# Patient Record
Sex: FEMALE | Race: WHITE | Employment: PART TIME | URBAN - METROPOLITAN AREA
[De-identification: names, ages, dates, MRNs, and addresses within clinical notes are randomized per-mention and may not be internally consistent; named-entity substitution may affect disease eponyms.]

---

## 2018-03-20 ENCOUNTER — OFFICE VISIT (OUTPATIENT)
Dept: FAMILY MEDICINE CLINIC | Age: 28
End: 2018-03-20
Payer: COMMERCIAL

## 2018-03-20 VITALS
HEIGHT: 63 IN | HEART RATE: 80 BPM | OXYGEN SATURATION: 99 % | TEMPERATURE: 97.9 F | BODY MASS INDEX: 26.22 KG/M2 | WEIGHT: 148 LBS | DIASTOLIC BLOOD PRESSURE: 73 MMHG | SYSTOLIC BLOOD PRESSURE: 108 MMHG

## 2018-03-20 DIAGNOSIS — E55.9 VITAMIN D DEFICIENCY: ICD-10-CM

## 2018-03-20 DIAGNOSIS — F41.9 ANXIETY: Primary | ICD-10-CM

## 2018-03-20 DIAGNOSIS — G44.89 OTHER HEADACHE SYNDROME: ICD-10-CM

## 2018-03-20 DIAGNOSIS — Z00.00 WELLNESS EXAMINATION: ICD-10-CM

## 2018-03-20 DIAGNOSIS — E07.89 PALPABLE THYROID: ICD-10-CM

## 2018-03-20 PROCEDURE — 99214 OFFICE O/P EST MOD 30 MIN: CPT | Performed by: FAMILY MEDICINE

## 2018-03-20 RX ORDER — BUSPIRONE HYDROCHLORIDE 5 MG/1
5 TABLET ORAL 3 TIMES DAILY
Qty: 90 TABLET | Refills: 1 | Status: SHIPPED | OUTPATIENT
Start: 2018-03-20 | End: 2018-04-20 | Stop reason: SDUPTHER

## 2018-03-20 RX ORDER — MULTIVIT-MIN/IRON/FOLIC ACID/K 18-600-40
CAPSULE ORAL
Qty: 60 CAPSULE | Refills: 5 | Status: SHIPPED | OUTPATIENT
Start: 2018-03-20

## 2018-03-20 RX ORDER — ADHESIVE TAPE 3"X 2.3 YD
TAPE, NON-MEDICATED TOPICAL
Qty: 60 TABLET | Refills: 5 | Status: SHIPPED | OUTPATIENT
Start: 2018-03-20 | End: 2018-07-24 | Stop reason: SDUPTHER

## 2018-03-20 ASSESSMENT — ENCOUNTER SYMPTOMS
BLOOD IN STOOL: 0
NAUSEA: 0
CONSTIPATION: 0
VOMITING: 0
TROUBLE SWALLOWING: 0
DIARRHEA: 0
EYE PAIN: 0
SHORTNESS OF BREATH: 0
COUGH: 0
ABDOMINAL PAIN: 0

## 2018-03-20 ASSESSMENT — PATIENT HEALTH QUESTIONNAIRE - PHQ9
SUM OF ALL RESPONSES TO PHQ9 QUESTIONS 1 & 2: 1
2. FEELING DOWN, DEPRESSED OR HOPELESS: 1
SUM OF ALL RESPONSES TO PHQ QUESTIONS 1-9: 1
1. LITTLE INTEREST OR PLEASURE IN DOING THINGS: 0

## 2018-03-20 NOTE — PROGRESS NOTES
by mouth daily       No current facility-administered medications for this visit. No Known Allergies    Health Maintenance   Topic Date Due    Cervical cancer screen  01/01/2016    Flu vaccine (1) 09/01/2017    DTaP/Tdap/Td vaccine (2 - Td) 07/28/2026    HIV screen  Completed       Subjective:      Review of Systems   Constitutional: Negative for chills, fatigue and fever. HENT: Negative for ear pain, postnasal drip and trouble swallowing. Eyes: Negative for pain and visual disturbance. Respiratory: Negative for cough and shortness of breath. Cardiovascular: Negative for chest pain and palpitations. Gastrointestinal: Negative for abdominal pain, blood in stool, constipation, diarrhea, nausea and vomiting. Genitourinary: Negative for difficulty urinating. Skin: Negative for rash. Neurological: Positive for headaches. Objective:     Vitals:    03/20/18 0913   BP: 108/73   Site: Left Arm   Position: Sitting   Cuff Size: Medium Adult   Pulse: 80   Temp: 97.9 °F (36.6 °C)   TempSrc: Oral   SpO2: 99%   Weight: 148 lb (67.1 kg)   Height: 5' 2.99\" (1.6 m)       Body mass index is 26.22 kg/m². Wt Readings from Last 3 Encounters:   03/20/18 148 lb (67.1 kg)   07/26/16 176 lb (79.8 kg)   07/08/16 174 lb (78.9 kg)     BP Readings from Last 3 Encounters:   03/20/18 108/73   07/28/16 110/66   07/08/16 104/65       Physical Exam   Constitutional: She is oriented to person, place, and time. She appears well-developed and well-nourished. No distress. HENT:   Head: Normocephalic and atraumatic. Right Ear: External ear normal.   Left Ear: External ear normal.   Eyes: Conjunctivae and EOM are normal. Right eye exhibits no discharge. Left eye exhibits no discharge. No scleral icterus. Neck: Normal range of motion. Thyromegaly: palpable thyroid. Cardiovascular: Normal rate, regular rhythm and normal heart sounds. No murmur heard.   Pulmonary/Chest: Effort normal and breath sounds normal.

## 2018-03-20 NOTE — PATIENT INSTRUCTIONS
You may receive a survey about your visit with us today. The feedback from our patients helps us identify what is working well and where the service to all patients can be enhanced. Thank you! As of April 2018, Dr. Sylwia Puente of [address] [place] [address] will be located in our new satellite office at: [address] [address] [facility], [address] [facility], [facility], [address]. My Chart letters will be sent soon. [] Caffeine Use: Limit to 2 cups per day   (400mg of caffeine a day)     [] Exercise: Ideal 30 minutes per day, above normal activity              [] Eating Fruits and Vegetables: Studies show 8-10 servings per day decrease BP  [] Alcohol: Ideal maximum of one cup per day for females and two cups per day for a male       Will try some buspar and can take it up to 3 times a day    Can think about a regular medication    Can see Manny Alamo for the anxiety for tools also    Will have your start some vitamin D 2,000 a day and go up to 4,000 a day    Will start some magnesium 200mg a day at first - and may go to 3 times a day for the headaches    Will see Truman Kirby in a month    Patient Education        Learning About Anxiety Disorders  What are anxiety disorders? Anxiety disorders are a type of medical problem. They cause severe anxiety. When you feel anxious, you feel that something bad is about to happen. This feeling interferes with your life. These disorders include:  · Generalized anxiety disorder. You feel worried and stressed about many everyday events and activities. This goes on for several months and disrupts your life on most days. · Panic disorder. You have repeated panic attacks. A panic attack is a sudden, intense fear or anxiety. It may make you feel short of breath. Your heart may pound. · Social anxiety disorder. You feel very anxious about what you will say or do in front of people. For example, you may be scared to talk or eat in public. This problem affects your daily life. · Phobias.  You are very scared of a specific object, situation, or activity. For example, you may fear spiders, high places, or small spaces. What are the symptoms? Generalized anxiety disorder  Symptoms may include:  · Feeling worried and stressed about many things almost every day. · Feeling tired or irritable. You may have a hard time concentrating. · Having headaches or muscle aches. · Having a hard time getting to sleep or staying asleep. Panic disorder  You may have repeated panic attacks when there is no reason for feeling afraid. You may change your daily activities because you worry that you will have another attack. Symptoms may include:  · Intense fear, terror, or anxiety. · Trouble breathing or very fast breathing. · Chest pain or tightness. · A heartbeat that races or is not regular. Social anxiety disorder  Symptoms may include:  · Fear about a social situation, such as eating in front of others or speaking in public. You may worry a lot. Or you may be afraid that something bad will happen. · Anxiety that can cause you to blush, sweat, and feel shaky. · A heartbeat that is faster than normal.  · A hard time focusing. Phobias  Symptoms may include:  · More fear than most people of being around an object, being in a situation, or doing an activity. You might also be stressed about the chance of being around the thing you fear. · Worry about losing control, panicking, fainting, or having physical symptoms like a faster heartbeat when you are around the situation or object. How are these disorders treated? Anxiety disorders can be treated with medicines or counseling. A combination of both may be used. Medicines may include:  · Antidepressants. These may help your symptoms by keeping chemicals in your brain in balance. · Benzodiazepines. These may give you short-term relief of your symptoms. Some people use cognitive-behavioral therapy.  A therapist helps you learn to change stressful or bad thoughts into helpful

## 2018-03-29 LAB
BUN BLDV-MCNC: NORMAL MG/DL
CALCIUM SERPL-MCNC: NORMAL MG/DL
CHLORIDE BLD-SCNC: NORMAL MMOL/L
CHOLESTEROL, TOTAL: 196 MG/DL
CHOLESTEROL/HDL RATIO: NORMAL
CO2: NORMAL MMOL/L
CREAT SERPL-MCNC: 0.75 MG/DL
GFR CALCULATED: NORMAL
GLUCOSE BLD-MCNC: NORMAL MG/DL
HDLC SERPL-MCNC: 62 MG/DL (ref 35–70)
LDL CHOLESTEROL CALCULATED: 123 MG/DL (ref 0–160)
POTASSIUM SERPL-SCNC: 4.2 MMOL/L
SODIUM BLD-SCNC: NORMAL MMOL/L
TRIGL SERPL-MCNC: 54 MG/DL
VLDLC SERPL CALC-MCNC: 11 MG/DL

## 2018-04-02 ENCOUNTER — OFFICE VISIT (OUTPATIENT)
Dept: PSYCHOLOGY | Age: 28
End: 2018-04-02
Payer: COMMERCIAL

## 2018-04-02 DIAGNOSIS — F41.9 ANXIETY DISORDER, UNSPECIFIED TYPE: Primary | ICD-10-CM

## 2018-04-02 PROCEDURE — 90791 PSYCH DIAGNOSTIC EVALUATION: CPT | Performed by: PSYCHOLOGIST

## 2018-04-02 ASSESSMENT — PATIENT HEALTH QUESTIONNAIRE - PHQ9
9. THOUGHTS THAT YOU WOULD BE BETTER OFF DEAD, OR OF HURTING YOURSELF: 0
4. FEELING TIRED OR HAVING LITTLE ENERGY: 2
SUM OF ALL RESPONSES TO PHQ9 QUESTIONS 1 & 2: 1
2. FEELING DOWN, DEPRESSED OR HOPELESS: 1
1. LITTLE INTEREST OR PLEASURE IN DOING THINGS: 0
SUM OF ALL RESPONSES TO PHQ QUESTIONS 1-9: 5
5. POOR APPETITE OR OVEREATING: 0
3. TROUBLE FALLING OR STAYING ASLEEP: 2
10. IF YOU CHECKED OFF ANY PROBLEMS, HOW DIFFICULT HAVE THESE PROBLEMS MADE IT FOR YOU TO DO YOUR WORK, TAKE CARE OF THINGS AT HOME, OR GET ALONG WITH OTHER PEOPLE: 0
6. FEELING BAD ABOUT YOURSELF - OR THAT YOU ARE A FAILURE OR HAVE LET YOURSELF OR YOUR FAMILY DOWN: 0
7. TROUBLE CONCENTRATING ON THINGS, SUCH AS READING THE NEWSPAPER OR WATCHING TELEVISION: 0
8. MOVING OR SPEAKING SO SLOWLY THAT OTHER PEOPLE COULD HAVE NOTICED. OR THE OPPOSITE, BEING SO FIGETY OR RESTLESS THAT YOU HAVE BEEN MOVING AROUND A LOT MORE THAN USUAL: 0

## 2018-04-04 ENCOUNTER — OFFICE VISIT (OUTPATIENT)
Dept: NEUROLOGY | Age: 28
End: 2018-04-04
Payer: COMMERCIAL

## 2018-04-04 VITALS
HEIGHT: 62 IN | WEIGHT: 151.4 LBS | SYSTOLIC BLOOD PRESSURE: 100 MMHG | DIASTOLIC BLOOD PRESSURE: 58 MMHG | BODY MASS INDEX: 27.86 KG/M2 | HEART RATE: 60 BPM

## 2018-04-04 DIAGNOSIS — G43.109 MIGRAINE WITH AURA AND WITHOUT STATUS MIGRAINOSUS, NOT INTRACTABLE: Primary | ICD-10-CM

## 2018-04-04 PROCEDURE — 99214 OFFICE O/P EST MOD 30 MIN: CPT | Performed by: PSYCHIATRY & NEUROLOGY

## 2018-04-04 RX ORDER — AMITRIPTYLINE HYDROCHLORIDE 10 MG/1
10 TABLET, FILM COATED ORAL NIGHTLY
Qty: 30 TABLET | Refills: 3 | Status: SHIPPED | OUTPATIENT
Start: 2018-04-04 | End: 2018-05-16 | Stop reason: SDUPTHER

## 2018-04-04 RX ORDER — FOLIC ACID 1 MG/1
1 TABLET ORAL DAILY
Qty: 90 TABLET | Refills: 3 | Status: SHIPPED | OUTPATIENT
Start: 2018-04-04 | End: 2018-09-12 | Stop reason: ALTCHOICE

## 2018-04-05 ENCOUNTER — TELEPHONE (OUTPATIENT)
Dept: FAMILY MEDICINE CLINIC | Age: 28
End: 2018-04-05

## 2018-04-16 ENCOUNTER — OFFICE VISIT (OUTPATIENT)
Dept: PSYCHOLOGY | Age: 28
End: 2018-04-16
Payer: COMMERCIAL

## 2018-04-16 ENCOUNTER — HOSPITAL ENCOUNTER (OUTPATIENT)
Dept: MRI IMAGING | Age: 28
Discharge: HOME | End: 2018-04-16
Payer: COMMERCIAL

## 2018-04-16 DIAGNOSIS — F41.9 ANXIETY DISORDER, UNSPECIFIED TYPE: Primary | ICD-10-CM

## 2018-04-16 DIAGNOSIS — G43.109 MIGRAINE WITH AURA AND WITHOUT STATUS MIGRAINOSUS, NOT INTRACTABLE: ICD-10-CM

## 2018-04-16 DIAGNOSIS — F43.21 GRIEF: ICD-10-CM

## 2018-04-16 PROCEDURE — 6360000004 HC RX CONTRAST MEDICATION: Performed by: PSYCHIATRY & NEUROLOGY

## 2018-04-16 PROCEDURE — A9579 GAD-BASE MR CONTRAST NOS,1ML: HCPCS | Performed by: PSYCHIATRY & NEUROLOGY

## 2018-04-16 PROCEDURE — 70553 MRI BRAIN STEM W/O & W/DYE: CPT

## 2018-04-16 PROCEDURE — 90834 PSYTX W PT 45 MINUTES: CPT | Performed by: PSYCHOLOGIST

## 2018-04-16 RX ADMIN — GADOTERIDOL 15 ML: 279.3 INJECTION, SOLUTION INTRAVENOUS at 14:50

## 2018-04-20 ENCOUNTER — OFFICE VISIT (OUTPATIENT)
Dept: FAMILY MEDICINE CLINIC | Age: 28
End: 2018-04-20
Payer: COMMERCIAL

## 2018-04-20 VITALS
OXYGEN SATURATION: 99 % | DIASTOLIC BLOOD PRESSURE: 66 MMHG | HEIGHT: 63 IN | TEMPERATURE: 98 F | HEART RATE: 75 BPM | SYSTOLIC BLOOD PRESSURE: 108 MMHG

## 2018-04-20 DIAGNOSIS — F41.9 ANXIETY: ICD-10-CM

## 2018-04-20 DIAGNOSIS — Z13.31 POSITIVE DEPRESSION SCREENING: Primary | ICD-10-CM

## 2018-04-20 DIAGNOSIS — E83.42 HYPOMAGNESEMIA: ICD-10-CM

## 2018-04-20 DIAGNOSIS — G44.89 OTHER HEADACHE SYNDROME: ICD-10-CM

## 2018-04-20 PROCEDURE — 99213 OFFICE O/P EST LOW 20 MIN: CPT | Performed by: NURSE PRACTITIONER

## 2018-04-20 PROCEDURE — G8431 POS CLIN DEPRES SCRN F/U DOC: HCPCS | Performed by: NURSE PRACTITIONER

## 2018-04-20 RX ORDER — BUSPIRONE HYDROCHLORIDE 5 MG/1
5 TABLET ORAL 3 TIMES DAILY
Qty: 90 TABLET | Refills: 3 | Status: SHIPPED | OUTPATIENT
Start: 2018-04-20 | End: 2018-07-24 | Stop reason: SDUPTHER

## 2018-04-20 ASSESSMENT — ENCOUNTER SYMPTOMS
COUGH: 0
EYE DISCHARGE: 0
ANAL BLEEDING: 0
RHINORRHEA: 0
BLOOD IN STOOL: 0
ABDOMINAL DISTENTION: 0
DIARRHEA: 0
COLOR CHANGE: 0
CONSTIPATION: 0
SHORTNESS OF BREATH: 0
NAUSEA: 0
SORE THROAT: 0
EYE REDNESS: 0
ABDOMINAL PAIN: 0

## 2018-05-14 ENCOUNTER — OFFICE VISIT (OUTPATIENT)
Dept: PSYCHOLOGY | Age: 28
End: 2018-05-14
Payer: COMMERCIAL

## 2018-05-14 DIAGNOSIS — F41.9 ANXIETY DISORDER, UNSPECIFIED TYPE: ICD-10-CM

## 2018-05-14 DIAGNOSIS — F43.21 GRIEF: Primary | ICD-10-CM

## 2018-05-14 PROCEDURE — 90832 PSYTX W PT 30 MINUTES: CPT | Performed by: PSYCHOLOGIST

## 2018-05-16 ENCOUNTER — OFFICE VISIT (OUTPATIENT)
Dept: NEUROLOGY | Age: 28
End: 2018-05-16
Payer: COMMERCIAL

## 2018-05-16 VITALS
HEIGHT: 62 IN | SYSTOLIC BLOOD PRESSURE: 118 MMHG | DIASTOLIC BLOOD PRESSURE: 78 MMHG | BODY MASS INDEX: 27.42 KG/M2 | WEIGHT: 149 LBS | HEART RATE: 98 BPM

## 2018-05-16 DIAGNOSIS — G43.109 MIGRAINE WITH AURA AND WITHOUT STATUS MIGRAINOSUS, NOT INTRACTABLE: Primary | ICD-10-CM

## 2018-05-16 PROCEDURE — 99213 OFFICE O/P EST LOW 20 MIN: CPT | Performed by: PSYCHIATRY & NEUROLOGY

## 2018-05-16 RX ORDER — AMITRIPTYLINE HYDROCHLORIDE 10 MG/1
10 TABLET, FILM COATED ORAL NIGHTLY
Qty: 30 TABLET | Refills: 5 | Status: SHIPPED | OUTPATIENT
Start: 2018-05-16 | End: 2018-07-24 | Stop reason: ALTCHOICE

## 2018-07-24 ENCOUNTER — OFFICE VISIT (OUTPATIENT)
Dept: FAMILY MEDICINE CLINIC | Age: 28
End: 2018-07-24
Payer: COMMERCIAL

## 2018-07-24 VITALS
HEIGHT: 62 IN | WEIGHT: 150.8 LBS | HEART RATE: 68 BPM | DIASTOLIC BLOOD PRESSURE: 68 MMHG | SYSTOLIC BLOOD PRESSURE: 104 MMHG | TEMPERATURE: 98.6 F | OXYGEN SATURATION: 98 % | BODY MASS INDEX: 27.75 KG/M2

## 2018-07-24 DIAGNOSIS — R90.82 WHITE MATTER ABNORMALITY ON MRI OF BRAIN: ICD-10-CM

## 2018-07-24 DIAGNOSIS — F41.9 ANXIETY: Primary | ICD-10-CM

## 2018-07-24 DIAGNOSIS — G44.89 OTHER HEADACHE SYNDROME: ICD-10-CM

## 2018-07-24 DIAGNOSIS — E55.9 VITAMIN D DEFICIENCY: ICD-10-CM

## 2018-07-24 DIAGNOSIS — R40.0 DAYTIME SOMNOLENCE: ICD-10-CM

## 2018-07-24 PROCEDURE — 99214 OFFICE O/P EST MOD 30 MIN: CPT | Performed by: FAMILY MEDICINE

## 2018-07-24 RX ORDER — ADHESIVE TAPE 3"X 2.3 YD
TAPE, NON-MEDICATED TOPICAL
Qty: 60 TABLET | Refills: 5 | Status: SHIPPED | OUTPATIENT
Start: 2018-07-24 | End: 2019-03-19

## 2018-07-24 RX ORDER — MULTIVIT-MIN/IRON/FOLIC ACID/K 18-600-40
CAPSULE ORAL
Qty: 60 CAPSULE | Refills: 5 | Status: CANCELLED | OUTPATIENT
Start: 2018-07-24

## 2018-07-24 RX ORDER — BUSPIRONE HYDROCHLORIDE 5 MG/1
5 TABLET ORAL 3 TIMES DAILY
Qty: 90 TABLET | Refills: 3 | Status: SHIPPED | OUTPATIENT
Start: 2018-07-24 | End: 2019-05-22 | Stop reason: SDUPTHER

## 2018-07-24 RX ORDER — OMEPRAZOLE 20 MG/1
20 CAPSULE, DELAYED RELEASE ORAL 2 TIMES DAILY
Qty: 30 CAPSULE | Refills: 3 | Status: SHIPPED | OUTPATIENT
Start: 2018-07-24 | End: 2018-09-12 | Stop reason: SDUPTHER

## 2018-07-24 ASSESSMENT — ENCOUNTER SYMPTOMS
NAUSEA: 0
EYE PAIN: 0
TROUBLE SWALLOWING: 0
CONSTIPATION: 0
VOMITING: 0
BLOOD IN STOOL: 0
ABDOMINAL PAIN: 0
COUGH: 0
SHORTNESS OF BREATH: 0
DIARRHEA: 0
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
## 2018-09-12 ENCOUNTER — OFFICE VISIT (OUTPATIENT)
Dept: FAMILY MEDICINE CLINIC | Age: 28
End: 2018-09-12
Payer: COMMERCIAL

## 2018-09-12 VITALS
BODY MASS INDEX: 27.49 KG/M2 | DIASTOLIC BLOOD PRESSURE: 80 MMHG | HEIGHT: 62 IN | SYSTOLIC BLOOD PRESSURE: 96 MMHG | RESPIRATION RATE: 12 BRPM | TEMPERATURE: 98.3 F | OXYGEN SATURATION: 99 % | WEIGHT: 149.4 LBS | HEART RATE: 65 BPM

## 2018-09-12 DIAGNOSIS — Z00.00 WELLNESS EXAMINATION: Primary | ICD-10-CM

## 2018-09-12 PROCEDURE — 99395 PREV VISIT EST AGE 18-39: CPT | Performed by: NURSE PRACTITIONER

## 2018-09-12 RX ORDER — LANOLIN ALCOHOL/MO/W.PET/CERES
3 CREAM (GRAM) TOPICAL NIGHTLY PRN
COMMUNITY
End: 2019-03-19

## 2018-09-12 RX ORDER — OMEPRAZOLE 20 MG/1
20 CAPSULE, DELAYED RELEASE ORAL 2 TIMES DAILY
Qty: 180 CAPSULE | Refills: 1 | Status: SHIPPED | OUTPATIENT
Start: 2018-09-12 | End: 2018-11-19

## 2018-09-12 ASSESSMENT — ENCOUNTER SYMPTOMS
ABDOMINAL PAIN: 0
NAUSEA: 0
SORE THROAT: 0
COUGH: 0
CONSTIPATION: 0
EYE REDNESS: 0
COLOR CHANGE: 0
BLOOD IN STOOL: 0
RHINORRHEA: 0
EYE DISCHARGE: 0
ABDOMINAL DISTENTION: 0
SHORTNESS OF BREATH: 0
DIARRHEA: 0
ANAL BLEEDING: 0

## 2018-09-12 NOTE — PROGRESS NOTES
nausea. Skin: Negative for color change and rash. Neurological: Negative for facial asymmetry, speech difficulty and weakness. Hematological: Does not bruise/bleed easily. Psychiatric/Behavioral: Negative for agitation and confusion. Objective:     Vitals:    09/12/18 1451   BP: 96/80   Site: Left Upper Arm   Position: Sitting   Cuff Size: Medium Adult   Pulse: 65   Resp: 12   Temp: 98.3 °F (36.8 °C)   TempSrc: Oral   SpO2: 99%   Weight: 149 lb 6.4 oz (67.8 kg)   Height: 5' 2.4\" (1.585 m)       Body mass index is 26.97 kg/m². Wt Readings from Last 3 Encounters:   09/12/18 149 lb 6.4 oz (67.8 kg)   07/24/18 150 lb 12.8 oz (68.4 kg)   05/16/18 149 lb (67.6 kg)     BP Readings from Last 3 Encounters:   09/12/18 96/80   07/24/18 104/68   05/16/18 118/78       Physical Exam   Constitutional: She is oriented to person, place, and time. She appears well-developed and well-nourished. No distress. HENT:   Head: Normocephalic and atraumatic. Right Ear: Hearing and external ear normal. No swelling. Left Ear: Hearing and external ear normal. No swelling. Nose: No mucosal edema or rhinorrhea. Right sinus exhibits no maxillary sinus tenderness and no frontal sinus tenderness. Left sinus exhibits no maxillary sinus tenderness and no frontal sinus tenderness. Mouth/Throat: Oropharynx is clear and moist. No oropharyngeal exudate or posterior oropharyngeal erythema. Eyes: Pupils are equal, round, and reactive to light. Conjunctivae are normal. Right eye exhibits no discharge. Left eye exhibits no discharge. Neck: Normal range of motion. Cardiovascular: Normal rate and regular rhythm. No lower extremity edema   Pulmonary/Chest: Effort normal and breath sounds normal. No respiratory distress. She has no wheezes. Musculoskeletal: She exhibits no tenderness or deformity. Full ROM of upper and lower extremities    Lymphadenopathy:     She has no cervical adenopathy.    Neurological: She is alert and oriented to person, place, and time. Coordination and gait normal.   Skin: Skin is warm and dry. No rash noted. She is not diaphoretic. No cyanosis. Nails show no clubbing. Psychiatric: She has a normal mood and affect. Her speech is normal and behavior is normal. Judgment and thought content normal. Cognition and memory are normal.     Lab Results   Component Value Date    WBC 8.0 07/26/2016    HGB 12.1 07/26/2016    HCT 36.5 (L) 07/26/2016     (L) 07/26/2016    CHOL 196 03/29/2018    TRIG 54 03/29/2018    HDL 62 03/29/2018    LDLCALC 123 03/29/2018    K 4.2 03/29/2018    CREATININE 0.75 03/29/2018    TSH 0.791 12/04/2015    LABA1C 5.0 10/21/2015    MG 2.0 10/21/2015    CALCIUM 9.8 10/21/2015    VITD25 29 (L) 10/21/2015       Assessment:       Diagnosis Orders   1. 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 Orders Placed:  No orders of the defined types were placed in this encounter. Medications Prescribed:  Orders Placed This Encounter   Medications    omeprazole (PRILOSEC) 20 MG delayed release capsule     Sig: Take 1 capsule by mouth 2 times daily     Dispense:  180 capsule     Refill:  1       Future Appointments  Date Time Provider Lesley Alvarez   11/19/2018 1:30 PM MD [facility] [address]        Patient given educational materials - see patient instructions. Discussed use, benefit, and side effects of prescribed medications. All patient questions answered. Pt voiced understanding. Reviewed health maintenance. Instructed to continue current medications, diet and exercise. Patient agreed with treatment plan. Follow up as directed.      Electronically signed by MELANY Truong CNP on 9/12/2018 at 4:02 PM

## 2018-10-03 ENCOUNTER — HOSPITAL ENCOUNTER (EMERGENCY)
Dept: GENERAL RADIOLOGY | Age: 28
Discharge: HOME | End: 2018-10-03
Payer: COMMERCIAL

## 2018-10-03 ENCOUNTER — HOSPITAL ENCOUNTER (EMERGENCY)
Age: 28
Discharge: HOME | End: 2018-10-03
Attending: NURSE PRACTITIONER
Payer: COMMERCIAL

## 2018-10-03 VITALS
BODY MASS INDEX: 28.13 KG/M2 | RESPIRATION RATE: 16 BRPM | HEIGHT: 61 IN | OXYGEN SATURATION: 97 % | TEMPERATURE: 98.5 F | SYSTOLIC BLOOD PRESSURE: 121 MMHG | WEIGHT: 149 LBS | DIASTOLIC BLOOD PRESSURE: 61 MMHG | HEART RATE: 81 BPM

## 2018-10-03 DIAGNOSIS — S70.02XA CONTUSION OF LEFT HIP AND THIGH, INITIAL ENCOUNTER: Primary | ICD-10-CM

## 2018-10-03 DIAGNOSIS — S70.12XA CONTUSION OF LEFT HIP AND THIGH, INITIAL ENCOUNTER: Primary | ICD-10-CM

## 2018-10-03 PROCEDURE — 73502 X-RAY EXAM HIP UNI 2-3 VIEWS: CPT

## 2018-10-03 PROCEDURE — 99213 OFFICE O/P EST LOW 20 MIN: CPT

## 2018-10-03 PROCEDURE — 99213 OFFICE O/P EST LOW 20 MIN: CPT | Performed by: NURSE PRACTITIONER

## 2018-10-03 RX ORDER — IBUPROFEN 800 MG/1
800 TABLET ORAL EVERY 6 HOURS PRN
COMMUNITY
End: 2019-03-19

## 2018-10-03 ASSESSMENT — ENCOUNTER SYMPTOMS
COLOR CHANGE: 0
BACK PAIN: 0

## 2018-10-03 ASSESSMENT — PAIN DESCRIPTION - PROGRESSION: CLINICAL_PROGRESSION: GRADUALLY WORSENING

## 2018-10-03 ASSESSMENT — PAIN DESCRIPTION - ONSET: ONSET: SUDDEN

## 2018-10-03 ASSESSMENT — PAIN DESCRIPTION - LOCATION: LOCATION: HIP

## 2018-10-03 ASSESSMENT — PAIN DESCRIPTION - ORIENTATION: ORIENTATION: LEFT;OUTER

## 2018-10-03 ASSESSMENT — PAIN SCALES - GENERAL: PAINLEVEL_OUTOF10: 6

## 2018-10-03 ASSESSMENT — PAIN DESCRIPTION - FREQUENCY: FREQUENCY: CONTINUOUS

## 2018-10-03 ASSESSMENT — PAIN DESCRIPTION - PAIN TYPE: TYPE: ACUTE PAIN

## 2018-10-03 ASSESSMENT — PAIN DESCRIPTION - DESCRIPTORS: DESCRIPTORS: BURNING;DULL;SHARP

## 2018-10-03 NOTE — ED NOTES
Patient verbalized understanding of discharge instructions. Denies questions or concerns at this time.       Tess Singh RN  10/03/18 4327

## 2018-10-03 NOTE — ED PROVIDER NOTES
tablet by mouth 3 times daily, Disp-90 tablet, R-3Normal      Magnesium Oxide 200 MG TABS One tablet twice a day, Disp-60 tablet, R-5Normal      Cholecalciferol (VITAMIN D) 2000 units CAPS capsule 2,000 a day for 2 weeks and then 4,000 a day, Disp-60 capsule, R-5Normal      Prenatal Vit-Fe Fumarate-FA (PRENATAL COMPLETE PO) Take 1 tablet by mouth daily             ALLERGIES     Patient is has No Known Allergies. FAMILY HISTORY     Patient's family history includes Cancer in her maternal grandfather; Depression in her maternal aunt and maternal grandmother; Heart Disease in her maternal grandfather; High Blood Pressure in her father and maternal grandfather; High Cholesterol in her father; No Known Problems in her sister; Other in her mother. SOCIAL HISTORY     Patient  reports that she has never smoked. She has never used smokeless tobacco. She reports that she drinks alcohol. She reports that she does not use drugs. PHYSICAL EXAM     ED TRIAGE VITALS  BP: 121/61, Temp: 98.5 °F (36.9 °C), Pulse: 81, Resp: 16, SpO2: 97 %  Physical Exam   Constitutional: She is oriented to person, place, and time. She appears well-developed and well-nourished. No distress. HENT:   Head: Normocephalic and atraumatic. Musculoskeletal:        Left hip: Normal. She exhibits normal range of motion, normal strength, no tenderness, no bony tenderness, no swelling and no deformity. Left upper leg: She exhibits tenderness and swelling. She exhibits no bony tenderness, no deformity and no laceration. Legs:  Neurological: She is alert and oriented to person, place, and time. Skin: Skin is warm and dry. Psychiatric: She has a normal mood and affect. Nursing note and vitals reviewed. DIAGNOSTIC RESULTS   Labs:No results found for this visit on 10/03/18. IMAGING:  XR HIP LEFT (2-3 VIEWS)   Final Result   No acute fracture or dislocation.  If there is high clinical suspicion for fracture consider limited MRI

## 2018-10-10 ENCOUNTER — OFFICE VISIT (OUTPATIENT)
Dept: FAMILY MEDICINE CLINIC | Age: 28
End: 2018-10-10
Payer: COMMERCIAL

## 2018-10-10 VITALS
RESPIRATION RATE: 12 BRPM | TEMPERATURE: 98.2 F | HEIGHT: 61 IN | OXYGEN SATURATION: 98 % | DIASTOLIC BLOOD PRESSURE: 72 MMHG | SYSTOLIC BLOOD PRESSURE: 98 MMHG | BODY MASS INDEX: 28.17 KG/M2 | WEIGHT: 149.2 LBS | HEART RATE: 76 BPM

## 2018-10-10 DIAGNOSIS — J01.10 ACUTE NON-RECURRENT FRONTAL SINUSITIS: Primary | ICD-10-CM

## 2018-10-10 PROCEDURE — 99213 OFFICE O/P EST LOW 20 MIN: CPT | Performed by: NURSE PRACTITIONER

## 2018-10-10 RX ORDER — FLUTICASONE PROPIONATE 50 MCG
1 SPRAY, SUSPENSION (ML) NASAL DAILY
Qty: 1 BOTTLE | Refills: 3 | Status: SHIPPED | OUTPATIENT
Start: 2018-10-10 | End: 2019-03-19

## 2018-10-10 RX ORDER — ALCOHOL ANTISEPTIC PADS
2 PADS, MEDICATED (EA) TOPICAL 2 TIMES DAILY
COMMUNITY
End: 2021-06-15 | Stop reason: ALTCHOICE

## 2018-10-10 RX ORDER — AMOXICILLIN AND CLAVULANATE POTASSIUM 875; 125 MG/1; MG/1
1 TABLET, FILM COATED ORAL 2 TIMES DAILY
Qty: 10 TABLET | Refills: 0 | Status: SHIPPED | OUTPATIENT
Start: 2018-10-10 | End: 2018-10-15

## 2018-10-10 ASSESSMENT — ENCOUNTER SYMPTOMS
BLOOD IN STOOL: 0
SORE THROAT: 1
SHORTNESS OF BREATH: 0
ABDOMINAL DISTENTION: 0
NAUSEA: 0
DIARRHEA: 0
CONSTIPATION: 0
ANAL BLEEDING: 0
RHINORRHEA: 0
EYE DISCHARGE: 0
SINUS PAIN: 1
SINUS PRESSURE: 1
COLOR CHANGE: 0
COUGH: 1
ABDOMINAL PAIN: 0
EYE REDNESS: 0

## 2018-10-10 NOTE — PROGRESS NOTES
 fluticasone (FLONASE) 50 MCG/ACT nasal spray 1 spray by Each Nare route daily 1 Bottle 3    amoxicillin-clavulanate (AUGMENTIN) 875-125 MG per tablet Take 1 tablet by mouth 2 times daily for 5 days 10 tablet 0    ibuprofen (ADVIL;MOTRIN) 800 MG tablet Take 800 mg by mouth every 6 hours as needed for Pain      B Complex-C (SUPER B COMPLEX PO) Take 1 tablet by mouth daily      melatonin 3 MG TABS tablet Take 3 mg by mouth nightly as needed      omeprazole (PRILOSEC) 20 MG delayed release capsule Take 1 capsule by mouth 2 times daily 180 capsule 1    busPIRone (BUSPAR) 5 MG tablet Take 1 tablet by mouth 3 times daily 90 tablet 3    Magnesium Oxide 200 MG TABS One tablet twice a day 60 tablet 5    Cholecalciferol (VITAMIN D) 2000 units CAPS capsule 2,000 a day for 2 weeks and then 4,000 a day (Patient taking differently: Take 5,000 Units by mouth daily ) 60 capsule 5    Prenatal Vit-Fe Fumarate-FA (PRENATAL COMPLETE PO) Take 1 tablet by mouth daily       No current facility-administered medications for this visit. No Known Allergies    Subjective:    Review of Systems   Constitutional: Positive for chills and fatigue. Negative for fever. HENT: Positive for congestion, postnasal drip, sinus pain, sinus pressure and sore throat. Negative for ear pain and rhinorrhea. Bilateral ear pressure/itching     Eyes: Negative for discharge and redness. Respiratory: Positive for cough. Negative for shortness of breath. Cardiovascular: Negative for chest pain and leg swelling. Gastrointestinal: Negative for abdominal distention, abdominal pain, anal bleeding, blood in stool, constipation, diarrhea and nausea. Skin: Negative for color change and rash. Neurological: Positive for headaches. Negative for facial asymmetry, speech difficulty and weakness. Hematological: Does not bruise/bleed easily. Psychiatric/Behavioral: Negative for agitation and confusion.        Objective:     Vitals: 10/10/18 0831   BP: 98/72   Pulse: 76   Resp: 12   Temp: 98.2 °F (36.8 °C)   TempSrc: Oral   SpO2: 98%   Weight: 149 lb 3.2 oz (67.7 kg)   Height: 5' 0.98\" (1.549 m)       Body mass index is 28.21 kg/m². Wt Readings from Last 3 Encounters:   10/10/18 149 lb 3.2 oz (67.7 kg)   10/03/18 149 lb (67.6 kg)   09/12/18 149 lb 6.4 oz (67.8 kg)     BP Readings from Last 3 Encounters:   10/10/18 98/72   10/03/18 121/61   09/12/18 96/80       Physical Exam   Constitutional: She is oriented to person, place, and time. She appears well-developed and well-nourished. No distress. HENT:   Head: Normocephalic and atraumatic. Right Ear: Hearing and external ear normal. No swelling. Tympanic membrane is not erythematous, not retracted and not bulging. A middle ear effusion is present. Left Ear: Hearing and external ear normal. No swelling. Tympanic membrane is not erythematous, not retracted and not bulging. A middle ear effusion is present. Nose: Mucosal edema and rhinorrhea present. Right sinus exhibits maxillary sinus tenderness and frontal sinus tenderness. Left sinus exhibits maxillary sinus tenderness and frontal sinus tenderness. Mouth/Throat: Oropharynx is clear and moist. No oropharyngeal exudate or posterior oropharyngeal erythema. Eyes: Pupils are equal, round, and reactive to light. Conjunctivae are normal. Right eye exhibits no discharge. Left eye exhibits no discharge. Neck: Normal range of motion. Cardiovascular:   No lower extremity edema   Pulmonary/Chest: Effort normal and breath sounds normal. No respiratory distress. Musculoskeletal: She exhibits no tenderness or deformity. Full ROM of upper and lower extremities    Lymphadenopathy:        Head (right side): No submandibular, no tonsillar and no preauricular adenopathy present. Head (left side): No submandibular, no tonsillar and no preauricular adenopathy present. She has no cervical adenopathy.    Neurological: She is alert and

## 2018-10-10 NOTE — PATIENT INSTRUCTIONS
1. You may receive a survey about your visit with us today. The feedback from our patients helps us identify what is working well and where the service to all patients can be enhanced. Thank you! 2. Please bring in ALL medications BOTTLES, including inhalers, herbal supplements, over the counter, prescribed & non-prescribed medicine. The office would like actual medication bottles and a list.  3. Please note our OFFICE POLICIES:  a. Prior to getting your labs drawn, please check with your insurance company for benefits and eligibility of lab services. Often, insurance companies cover certain tests for preventative visits only. It is patient's responsibility to see what is covered. b. We are unable to change a diagnosis after the test has been performed. c. Lab orders will not be re-printed. Please hold onto your original lab orders and take them to your lab to be completed. d. If you no show your schedule appointment three times, you be dismissed from this practice. e. Fam Fang must be completed 24 hours prior to your schedule appointment. 4. If the list below has been completed, PLEASE FAX RECORDS TO OUR OFFICE @ [phone]. Once the records have been received we will update your records at our office. Health Maintenance Due   Topic Date Due    Flu vaccine (1) 09/01/2018       Schedule your 2018 flu vaccine with Dr. Mauricio Christie call [phone]! [facility], for anyone 9 years or older   [address]   Every Monday   8:00am-11:00am and 1:00pm-3:00pm    · Will give Augmentin - take as directed until gone  · Flonase nasal spray - use once daily for 5-7 days  · Can try robitussin liquid for congestion    · How can you care for yourself at home? · Take an over-the-counter pain medicine, such as acetaminophen (Tylenol), ibuprofen (Advil, Motrin), or naproxen (Aleve). Read and follow all instructions on the label.   · If the doctor prescribed antibiotics, take

## 2018-10-23 ENCOUNTER — OFFICE VISIT (OUTPATIENT)
Dept: FAMILY MEDICINE CLINIC | Age: 28
End: 2018-10-23
Payer: COMMERCIAL

## 2018-10-23 VITALS
RESPIRATION RATE: 12 BRPM | BODY MASS INDEX: 28.36 KG/M2 | WEIGHT: 150.2 LBS | SYSTOLIC BLOOD PRESSURE: 100 MMHG | HEART RATE: 70 BPM | HEIGHT: 61 IN | DIASTOLIC BLOOD PRESSURE: 76 MMHG | TEMPERATURE: 98.6 F | OXYGEN SATURATION: 100 %

## 2018-10-23 DIAGNOSIS — B96.89 ACUTE BACTERIAL SINUSITIS: Primary | ICD-10-CM

## 2018-10-23 DIAGNOSIS — J01.90 ACUTE BACTERIAL SINUSITIS: Primary | ICD-10-CM

## 2018-10-23 PROCEDURE — 99213 OFFICE O/P EST LOW 20 MIN: CPT | Performed by: FAMILY MEDICINE

## 2018-10-23 RX ORDER — AZITHROMYCIN 250 MG/1
TABLET, FILM COATED ORAL
Qty: 1 PACKET | Refills: 0 | Status: SHIPPED | OUTPATIENT
Start: 2018-10-23 | End: 2018-10-27

## 2018-10-23 RX ORDER — AZITHROMYCIN 250 MG/1
250 TABLET, FILM COATED ORAL DAILY
Qty: 6 TABLET | Refills: 0 | Status: SHIPPED | OUTPATIENT
Start: 2018-10-23 | End: 2018-10-23 | Stop reason: CLARIF

## 2018-10-23 ASSESSMENT — ENCOUNTER SYMPTOMS
VOMITING: 0
SINUS PAIN: 1
CONSTIPATION: 0
DIARRHEA: 0
TROUBLE SWALLOWING: 0
COUGH: 0
ABDOMINAL PAIN: 0
NAUSEA: 0
SHORTNESS OF BREATH: 0
SINUS PRESSURE: 1
BLOOD IN STOOL: 0
EYE PAIN: 0
RHINORRHEA: 1

## 2018-10-23 NOTE — PROGRESS NOTES
file.    Orders Placed:  No orders of the defined types were placed in this encounter. Medications Prescribed:  No orders of the defined types were placed in this encounter. Future Appointments  Date Time Provider Lesley Alvarez   11/19/2018 1:30 PM Kayla Marie MD [address] [facility]   3/18/2019 9:30 AM Cathryn Whitaker DO [facility] [address] [place] [address]       Patient given educational materials - see patient instructions. Discussed use, benefit, and sideeffects of prescribed medications. All patient questions answered. Pt voiced understanding. Reviewed health maintenance. Instructed to continue current medications, diet and exercise. Patient agreed with treatment plan. Follow up as directed.      Electronically signed by Chika Flores DO on 10/23/2018 at 12:39 PM

## 2018-11-19 ENCOUNTER — OFFICE VISIT (OUTPATIENT)
Dept: NEUROLOGY | Age: 28
End: 2018-11-19
Payer: COMMERCIAL

## 2018-11-19 VITALS
SYSTOLIC BLOOD PRESSURE: 116 MMHG | HEIGHT: 62 IN | DIASTOLIC BLOOD PRESSURE: 62 MMHG | HEART RATE: 64 BPM | WEIGHT: 153 LBS | BODY MASS INDEX: 28.16 KG/M2

## 2018-11-19 DIAGNOSIS — G43.109 MIGRAINE WITH AURA AND WITHOUT STATUS MIGRAINOSUS, NOT INTRACTABLE: Primary | ICD-10-CM

## 2018-11-19 PROCEDURE — 99213 OFFICE O/P EST LOW 20 MIN: CPT | Performed by: PSYCHIATRY & NEUROLOGY

## 2018-11-19 RX ORDER — RANITIDINE 150 MG/1
150 TABLET ORAL 2 TIMES DAILY
COMMUNITY
End: 2019-10-24

## 2019-01-15 ENCOUNTER — OFFICE VISIT (OUTPATIENT)
Dept: FAMILY MEDICINE CLINIC | Age: 29
End: 2019-01-15
Payer: COMMERCIAL

## 2019-01-15 VITALS
OXYGEN SATURATION: 99 % | WEIGHT: 150.8 LBS | HEART RATE: 66 BPM | TEMPERATURE: 98.5 F | SYSTOLIC BLOOD PRESSURE: 100 MMHG | DIASTOLIC BLOOD PRESSURE: 66 MMHG | BODY MASS INDEX: 28.47 KG/M2 | HEIGHT: 61 IN

## 2019-01-15 DIAGNOSIS — J32.9 CHRONIC SINUSITIS, UNSPECIFIED LOCATION: Primary | ICD-10-CM

## 2019-01-15 PROCEDURE — 99213 OFFICE O/P EST LOW 20 MIN: CPT | Performed by: NURSE PRACTITIONER

## 2019-01-15 ASSESSMENT — ENCOUNTER SYMPTOMS
WHEEZING: 0
RHINORRHEA: 1
CHEST TIGHTNESS: 0
TROUBLE SWALLOWING: 0
SHORTNESS OF BREATH: 0
COUGH: 1
SINUS PRESSURE: 1
SORE THROAT: 1
SINUS PAIN: 1

## 2019-01-17 ENCOUNTER — OFFICE VISIT (OUTPATIENT)
Dept: FAMILY MEDICINE CLINIC | Age: 29
End: 2019-01-17
Payer: COMMERCIAL

## 2019-01-17 VITALS
HEART RATE: 83 BPM | BODY MASS INDEX: 28.21 KG/M2 | WEIGHT: 149.4 LBS | SYSTOLIC BLOOD PRESSURE: 90 MMHG | DIASTOLIC BLOOD PRESSURE: 62 MMHG | TEMPERATURE: 99 F | OXYGEN SATURATION: 99 % | HEIGHT: 61 IN

## 2019-01-17 DIAGNOSIS — J01.01 ACUTE RECURRENT MAXILLARY SINUSITIS: Primary | ICD-10-CM

## 2019-01-17 PROCEDURE — 99213 OFFICE O/P EST LOW 20 MIN: CPT | Performed by: FAMILY MEDICINE

## 2019-01-17 RX ORDER — AZITHROMYCIN 500 MG/1
500 TABLET, FILM COATED ORAL DAILY
Qty: 6 TABLET | Refills: 0 | Status: SHIPPED | OUTPATIENT
Start: 2019-01-17 | End: 2019-01-23

## 2019-01-17 ASSESSMENT — ENCOUNTER SYMPTOMS
ABDOMINAL PAIN: 0
NAUSEA: 0
DIARRHEA: 0
SINUS PAIN: 1
SHORTNESS OF BREATH: 0
EYE PAIN: 0
COUGH: 0
TROUBLE SWALLOWING: 0
VOMITING: 0
BLOOD IN STOOL: 0
CONSTIPATION: 0

## 2019-03-18 RX ORDER — FLUTICASONE PROPIONATE 50 MCG
1 SPRAY, SUSPENSION (ML) NASAL DAILY
Qty: 1 BOTTLE | Refills: 3 | Status: CANCELLED | OUTPATIENT
Start: 2019-03-18

## 2019-03-19 ENCOUNTER — OFFICE VISIT (OUTPATIENT)
Dept: FAMILY MEDICINE CLINIC | Age: 29
End: 2019-03-19
Payer: COMMERCIAL

## 2019-03-19 VITALS
OXYGEN SATURATION: 100 % | WEIGHT: 148.8 LBS | RESPIRATION RATE: 12 BRPM | HEART RATE: 82 BPM | HEIGHT: 61 IN | DIASTOLIC BLOOD PRESSURE: 62 MMHG | BODY MASS INDEX: 28.09 KG/M2 | TEMPERATURE: 98.4 F | SYSTOLIC BLOOD PRESSURE: 114 MMHG

## 2019-03-19 DIAGNOSIS — J01.10 ACUTE NON-RECURRENT FRONTAL SINUSITIS: ICD-10-CM

## 2019-03-19 DIAGNOSIS — K21.9 GASTROESOPHAGEAL REFLUX DISEASE WITHOUT ESOPHAGITIS: Primary | ICD-10-CM

## 2019-03-19 DIAGNOSIS — Z3A.15 15 WEEKS GESTATION OF PREGNANCY: ICD-10-CM

## 2019-03-19 PROCEDURE — 99213 OFFICE O/P EST LOW 20 MIN: CPT | Performed by: NURSE PRACTITIONER

## 2019-03-19 RX ORDER — ACETAMINOPHEN 325 MG/1
500 TABLET ORAL EVERY 6 HOURS PRN
COMMUNITY

## 2019-03-19 RX ORDER — FERROUS SULFATE 325(65) MG
325 TABLET ORAL DAILY
COMMUNITY
End: 2020-02-04 | Stop reason: ALTCHOICE

## 2019-03-19 ASSESSMENT — ENCOUNTER SYMPTOMS
RHINORRHEA: 0
COUGH: 0
EYE REDNESS: 0
CONSTIPATION: 0
ANAL BLEEDING: 0
SORE THROAT: 0
ABDOMINAL DISTENTION: 0
EYE DISCHARGE: 0
SHORTNESS OF BREATH: 0
COLOR CHANGE: 0
NAUSEA: 0
DIARRHEA: 0
BLOOD IN STOOL: 0
ABDOMINAL PAIN: 0

## 2019-05-22 ENCOUNTER — PATIENT MESSAGE (OUTPATIENT)
Dept: FAMILY MEDICINE CLINIC | Age: 29
End: 2019-05-22

## 2019-05-22 DIAGNOSIS — F41.9 ANXIETY: ICD-10-CM

## 2019-05-22 NOTE — TELEPHONE ENCOUNTER
Last visit- 3/19/2019  Next visit- 10/21/2019    Requested Prescriptions     Pending Prescriptions Disp Refills    busPIRone (BUSPAR) 5 MG tablet 90 tablet 3     Sig: Take 1 tablet by mouth 3 times daily

## 2019-05-22 NOTE — TELEPHONE ENCOUNTER
From: Macky Phoenix  To: Jim Glover DO  Sent: 5/22/2019 11:03 AM EDT  Subject: Prescription Question    Good morning,    I am currently on buspar. I noticed the bottle says zero refills until July. I am currently not suppose to come back in for a check up until October. I was curious if you could send in a new prescription or if I need to come in and see you to get a refill.      Thank you for your time,  Dennis Allred

## 2019-05-23 RX ORDER — BUSPIRONE HYDROCHLORIDE 5 MG/1
5 TABLET ORAL 3 TIMES DAILY
Qty: 90 TABLET | Refills: 3 | Status: SHIPPED | OUTPATIENT
Start: 2019-05-23 | End: 2019-09-29 | Stop reason: SDUPTHER

## 2019-05-23 NOTE — TELEPHONE ENCOUNTER
Buspar is a category B in pregnancy    Pregnancy Recommendation   Limited Human Data--Animal Data Suggest Low Risk    Pregnancy Summary   Although no drug-induced congenital malformations have been observed after 1st-trimester exposure to buspirone, the data are too limited to assess the safety of the drug in human pregnancy

## 2019-08-13 ENCOUNTER — NURSE ONLY (OUTPATIENT)
Dept: FAMILY MEDICINE CLINIC | Age: 29
End: 2019-08-13
Payer: COMMERCIAL

## 2019-08-13 DIAGNOSIS — Z23 NEED FOR TDAP VACCINATION: Primary | ICD-10-CM

## 2019-08-13 DIAGNOSIS — Z34.90 PREGNANCY, UNSPECIFIED GESTATIONAL AGE: ICD-10-CM

## 2019-08-13 PROCEDURE — 90715 TDAP VACCINE 7 YRS/> IM: CPT | Performed by: NURSE PRACTITIONER

## 2019-08-13 PROCEDURE — 90471 IMMUNIZATION ADMIN: CPT | Performed by: NURSE PRACTITIONER

## 2019-08-13 NOTE — PROGRESS NOTES
Immunizations Administered     Name Date Dose Route    Tdap (Boostrix, Adacel) 8/13/2019 0.5 mL Intramuscular    Site: Deltoid- Left    Lot: [id number]    NDC: 27250-579-19          VIS GIVEN. CONSENT SIGNED  PATIENT TOLERATED WELL. ABN SIGNED.

## 2019-08-15 ENCOUNTER — HOSPITAL ENCOUNTER (OUTPATIENT)
Age: 29
Setting detail: SPECIMEN
Discharge: HOME | End: 2019-08-15
Payer: COMMERCIAL

## 2019-08-15 PROCEDURE — 87653 STREP B DNA AMP PROBE: CPT

## 2019-08-15 PROCEDURE — 87081 CULTURE SCREEN ONLY: CPT

## 2019-08-16 LAB — GROUP B STREP CULTURE: NORMAL

## 2019-09-03 ENCOUNTER — HOSPITAL ENCOUNTER (INPATIENT)
Age: 29
LOS: 2 days | Discharge: HOME | End: 2019-09-05
Attending: OBSTETRICS & GYNECOLOGY | Admitting: OBSTETRICS & GYNECOLOGY
Payer: COMMERCIAL

## 2019-09-03 ENCOUNTER — ANESTHESIA (OUTPATIENT)
Dept: LABOR AND DELIVERY | Age: 29
End: 2019-09-03
Payer: COMMERCIAL

## 2019-09-03 ENCOUNTER — ANESTHESIA EVENT (OUTPATIENT)
Dept: LABOR AND DELIVERY | Age: 29
End: 2019-09-03
Payer: COMMERCIAL

## 2019-09-03 ENCOUNTER — APPOINTMENT (OUTPATIENT)
Dept: LABOR AND DELIVERY | Age: 29
End: 2019-09-03
Payer: COMMERCIAL

## 2019-09-03 PROBLEM — Z34.90 ENCOUNTER FOR ELECTIVE INDUCTION OF LABOR: Status: ACTIVE | Noted: 2019-09-03

## 2019-09-03 LAB
ABO: NORMAL
AMPHETAMINE+METHAMPHETAMINE URINE SCREEN: NEGATIVE
ANTIBODY SCREEN: NORMAL
BARBITURATE QUANTITATIVE URINE: NEGATIVE
BASOPHILS # BLD: 0.3 %
BASOPHILS ABSOLUTE: 0 THOU/MM3 (ref 0–0.1)
BENZODIAZEPINE QUANTITATIVE URINE: NEGATIVE
CANNABINOID QUANTITATIVE URINE: NEGATIVE
COCAINE METABOLITE QUANTITATIVE URINE: NEGATIVE
EOSINOPHIL # BLD: 2 %
EOSINOPHILS ABSOLUTE: 0.2 THOU/MM3 (ref 0–0.4)
ERYTHROCYTE [DISTWIDTH] IN BLOOD BY AUTOMATED COUNT: 13.2 % (ref 11.5–14.5)
ERYTHROCYTE [DISTWIDTH] IN BLOOD BY AUTOMATED COUNT: 46.6 FL (ref 35–45)
HCT VFR BLD CALC: 38 % (ref 37–47)
HEMOGLOBIN: 12.5 GM/DL (ref 12–16)
IMMATURE GRANS (ABS): 0.1 THOU/MM3 (ref 0–0.07)
IMMATURE GRANULOCYTES: 1 %
LYMPHOCYTES # BLD: 16.5 %
LYMPHOCYTES ABSOLUTE: 1.3 THOU/MM3 (ref 1–4.8)
MCH RBC QN AUTO: 31.9 PG (ref 26–33)
MCHC RBC AUTO-ENTMCNC: 32.9 GM/DL (ref 32.2–35.5)
MCV RBC AUTO: 96.9 FL (ref 81–99)
MONOCYTES # BLD: 5.3 %
MONOCYTES ABSOLUTE: 0.4 THOU/MM3 (ref 0.4–1.3)
NUCLEATED RED BLOOD CELLS: 0 /100 WBC
OPIATES, URINE: NEGATIVE
OXYCODONE: NEGATIVE
PHENCYCLIDINE QUANTITATIVE URINE: NEGATIVE
PLATELET # BLD: 114 THOU/MM3 (ref 130–400)
PMV BLD AUTO: 12.9 FL (ref 9.4–12.4)
RBC # BLD: 3.92 MILL/MM3 (ref 4.2–5.4)
RH FACTOR: NORMAL
RH FACTOR: NORMAL
RPR: NONREACTIVE
SEG NEUTROPHILS: 74.6 %
SEGMENTED NEUTROPHILS ABSOLUTE COUNT: 5.9 THOU/MM3 (ref 1.8–7.7)
WBC # BLD: 7.9 THOU/MM3 (ref 4.8–10.8)

## 2019-09-03 PROCEDURE — 3E033VJ INTRODUCTION OF OTHER HORMONE INTO PERIPHERAL VEIN, PERCUTANEOUS APPROACH: ICD-10-PCS | Performed by: OBSTETRICS & GYNECOLOGY

## 2019-09-03 PROCEDURE — 3700000025 EPIDURAL BLOCK: Performed by: ANESTHESIOLOGY

## 2019-09-03 PROCEDURE — 36415 COLL VENOUS BLD VENIPUNCTURE: CPT

## 2019-09-03 PROCEDURE — 80305 DRUG TEST PRSMV DIR OPT OBS: CPT

## 2019-09-03 PROCEDURE — 85025 COMPLETE CBC W/AUTO DIFF WBC: CPT

## 2019-09-03 PROCEDURE — 86900 BLOOD TYPING SEROLOGIC ABO: CPT

## 2019-09-03 PROCEDURE — 2500000003 HC RX 250 WO HCPCS: Performed by: ANESTHESIOLOGY

## 2019-09-03 PROCEDURE — 2580000003 HC RX 258: Performed by: OBSTETRICS & GYNECOLOGY

## 2019-09-03 PROCEDURE — 85460 HEMOGLOBIN FETAL: CPT

## 2019-09-03 PROCEDURE — 7200000001 HC VAGINAL DELIVERY

## 2019-09-03 PROCEDURE — 6370000000 HC RX 637 (ALT 250 FOR IP): Performed by: OBSTETRICS & GYNECOLOGY

## 2019-09-03 PROCEDURE — 10907ZC DRAINAGE OF AMNIOTIC FLUID, THERAPEUTIC FROM PRODUCTS OF CONCEPTION, VIA NATURAL OR ARTIFICIAL OPENING: ICD-10-PCS | Performed by: OBSTETRICS & GYNECOLOGY

## 2019-09-03 PROCEDURE — 86901 BLOOD TYPING SEROLOGIC RH(D): CPT

## 2019-09-03 PROCEDURE — 1220000000 HC SEMI PRIVATE OB R&B

## 2019-09-03 PROCEDURE — 86592 SYPHILIS TEST NON-TREP QUAL: CPT

## 2019-09-03 PROCEDURE — 86850 RBC ANTIBODY SCREEN: CPT

## 2019-09-03 PROCEDURE — 0HQ9XZZ REPAIR PERINEUM SKIN, EXTERNAL APPROACH: ICD-10-PCS | Performed by: OBSTETRICS & GYNECOLOGY

## 2019-09-03 PROCEDURE — 2709999900 HC NON-CHARGEABLE SUPPLY

## 2019-09-03 RX ORDER — ONDANSETRON 2 MG/ML
8 INJECTION INTRAMUSCULAR; INTRAVENOUS EVERY 6 HOURS PRN
Status: DISCONTINUED | OUTPATIENT
Start: 2019-09-03 | End: 2019-09-03 | Stop reason: HOSPADM

## 2019-09-03 RX ORDER — TERBUTALINE SULFATE 1 MG/ML
0.25 INJECTION, SOLUTION SUBCUTANEOUS ONCE
Status: DISCONTINUED | OUTPATIENT
Start: 2019-09-03 | End: 2019-09-03 | Stop reason: HOSPADM

## 2019-09-03 RX ORDER — DIPHENHYDRAMINE HCL 25 MG
25 TABLET ORAL EVERY 6 HOURS PRN
Status: DISCONTINUED | OUTPATIENT
Start: 2019-09-03 | End: 2019-09-05 | Stop reason: HOSPADM

## 2019-09-03 RX ORDER — METHYLERGONOVINE MALEATE 0.2 MG/ML
200 INJECTION INTRAVENOUS
Status: ACTIVE | OUTPATIENT
Start: 2019-09-03 | End: 2019-09-03

## 2019-09-03 RX ORDER — IBUPROFEN 800 MG/1
800 TABLET ORAL EVERY 8 HOURS PRN
Status: DISCONTINUED | OUTPATIENT
Start: 2019-09-03 | End: 2019-09-03 | Stop reason: HOSPADM

## 2019-09-03 RX ORDER — CARBOPROST TROMETHAMINE 250 UG/ML
250 INJECTION, SOLUTION INTRAMUSCULAR PRN
Status: DISCONTINUED | OUTPATIENT
Start: 2019-09-03 | End: 2019-09-05 | Stop reason: HOSPADM

## 2019-09-03 RX ORDER — MORPHINE SULFATE 4 MG/ML
2 INJECTION, SOLUTION INTRAMUSCULAR; INTRAVENOUS
Status: DISCONTINUED | OUTPATIENT
Start: 2019-09-03 | End: 2019-09-05 | Stop reason: HOSPADM

## 2019-09-03 RX ORDER — EPHEDRINE SULFATE/0.9% NACL/PF 50 MG/5 ML
5 SYRINGE (ML) INTRAVENOUS PRN
Status: DISCONTINUED | OUTPATIENT
Start: 2019-09-03 | End: 2019-09-03

## 2019-09-03 RX ORDER — HYDROCODONE BITARTRATE AND ACETAMINOPHEN 5; 325 MG/1; MG/1
2 TABLET ORAL EVERY 4 HOURS PRN
Status: DISCONTINUED | OUTPATIENT
Start: 2019-09-03 | End: 2019-09-05 | Stop reason: HOSPADM

## 2019-09-03 RX ORDER — NALBUPHINE HCL 10 MG/ML
5 AMPUL (ML) INJECTION EVERY 4 HOURS PRN
Status: DISCONTINUED | OUTPATIENT
Start: 2019-09-03 | End: 2019-09-03 | Stop reason: HOSPADM

## 2019-09-03 RX ORDER — MORPHINE SULFATE 2 MG/ML
2 INJECTION, SOLUTION INTRAMUSCULAR; INTRAVENOUS
Status: DISCONTINUED | OUTPATIENT
Start: 2019-09-03 | End: 2019-09-03 | Stop reason: HOSPADM

## 2019-09-03 RX ORDER — MISOPROSTOL 200 UG/1
1000 TABLET ORAL PRN
Status: DISCONTINUED | OUTPATIENT
Start: 2019-09-03 | End: 2019-09-03 | Stop reason: HOSPADM

## 2019-09-03 RX ORDER — BUTORPHANOL TARTRATE 1 MG/ML
1 INJECTION, SOLUTION INTRAMUSCULAR; INTRAVENOUS
Status: DISCONTINUED | OUTPATIENT
Start: 2019-09-03 | End: 2019-09-03 | Stop reason: HOSPADM

## 2019-09-03 RX ORDER — DIPHENHYDRAMINE HYDROCHLORIDE 50 MG/ML
25 INJECTION INTRAMUSCULAR; INTRAVENOUS EVERY 4 HOURS PRN
Status: DISCONTINUED | OUTPATIENT
Start: 2019-09-03 | End: 2019-09-03 | Stop reason: HOSPADM

## 2019-09-03 RX ORDER — SODIUM CHLORIDE 0.9 % (FLUSH) 0.9 %
10 SYRINGE (ML) INJECTION PRN
Status: DISCONTINUED | OUTPATIENT
Start: 2019-09-03 | End: 2019-09-03 | Stop reason: HOSPADM

## 2019-09-03 RX ORDER — SODIUM CHLORIDE 0.9 % (FLUSH) 0.9 %
10 SYRINGE (ML) INJECTION PRN
Status: DISCONTINUED | OUTPATIENT
Start: 2019-09-03 | End: 2019-09-05 | Stop reason: HOSPADM

## 2019-09-03 RX ORDER — ACETAMINOPHEN 325 MG/1
650 TABLET ORAL EVERY 4 HOURS PRN
Status: DISCONTINUED | OUTPATIENT
Start: 2019-09-03 | End: 2019-09-03 | Stop reason: HOSPADM

## 2019-09-03 RX ORDER — IBUPROFEN 800 MG/1
800 TABLET ORAL 3 TIMES DAILY
Status: DISCONTINUED | OUTPATIENT
Start: 2019-09-03 | End: 2019-09-05 | Stop reason: HOSPADM

## 2019-09-03 RX ORDER — LANOLIN 100 %
OINTMENT (GRAM) TOPICAL PRN
Status: DISCONTINUED | OUTPATIENT
Start: 2019-09-03 | End: 2019-09-05 | Stop reason: HOSPADM

## 2019-09-03 RX ORDER — ZOLPIDEM TARTRATE 10 MG/1
10 TABLET ORAL NIGHTLY PRN
Status: DISCONTINUED | OUTPATIENT
Start: 2019-09-03 | End: 2019-09-05 | Stop reason: HOSPADM

## 2019-09-03 RX ORDER — SODIUM CHLORIDE, SODIUM LACTATE, POTASSIUM CHLORIDE, CALCIUM CHLORIDE 600; 310; 30; 20 MG/100ML; MG/100ML; MG/100ML; MG/100ML
INJECTION, SOLUTION INTRAVENOUS CONTINUOUS
Status: DISCONTINUED | OUTPATIENT
Start: 2019-09-03 | End: 2019-09-03

## 2019-09-03 RX ORDER — MORPHINE SULFATE 4 MG/ML
4 INJECTION, SOLUTION INTRAMUSCULAR; INTRAVENOUS
Status: DISCONTINUED | OUTPATIENT
Start: 2019-09-03 | End: 2019-09-05 | Stop reason: HOSPADM

## 2019-09-03 RX ORDER — ONDANSETRON 4 MG/1
8 TABLET, FILM COATED ORAL EVERY 8 HOURS PRN
Status: DISCONTINUED | OUTPATIENT
Start: 2019-09-03 | End: 2019-09-05 | Stop reason: HOSPADM

## 2019-09-03 RX ORDER — MORPHINE SULFATE 4 MG/ML
4 INJECTION, SOLUTION INTRAMUSCULAR; INTRAVENOUS
Status: DISCONTINUED | OUTPATIENT
Start: 2019-09-03 | End: 2019-09-03 | Stop reason: HOSPADM

## 2019-09-03 RX ORDER — NALOXONE HYDROCHLORIDE 0.4 MG/ML
0.4 INJECTION, SOLUTION INTRAMUSCULAR; INTRAVENOUS; SUBCUTANEOUS PRN
Status: DISCONTINUED | OUTPATIENT
Start: 2019-09-03 | End: 2019-09-03 | Stop reason: HOSPADM

## 2019-09-03 RX ORDER — SEVOFLURANE 250 ML/250ML
1 LIQUID RESPIRATORY (INHALATION) CONTINUOUS PRN
Status: DISCONTINUED | OUTPATIENT
Start: 2019-09-03 | End: 2019-09-03

## 2019-09-03 RX ORDER — ONDANSETRON 2 MG/ML
4 INJECTION INTRAMUSCULAR; INTRAVENOUS EVERY 6 HOURS PRN
Status: DISCONTINUED | OUTPATIENT
Start: 2019-09-03 | End: 2019-09-05 | Stop reason: HOSPADM

## 2019-09-03 RX ORDER — FERROUS SULFATE 325(65) MG
325 TABLET ORAL
Status: DISCONTINUED | OUTPATIENT
Start: 2019-09-04 | End: 2019-09-05 | Stop reason: HOSPADM

## 2019-09-03 RX ORDER — FAMOTIDINE 20 MG/1
20 TABLET, FILM COATED ORAL DAILY
Status: DISCONTINUED | OUTPATIENT
Start: 2019-09-03 | End: 2019-09-04 | Stop reason: ALTCHOICE

## 2019-09-03 RX ORDER — ONDANSETRON 2 MG/ML
4 INJECTION INTRAMUSCULAR; INTRAVENOUS EVERY 6 HOURS PRN
Status: DISCONTINUED | OUTPATIENT
Start: 2019-09-03 | End: 2019-09-03 | Stop reason: HOSPADM

## 2019-09-03 RX ORDER — SODIUM CHLORIDE, SODIUM LACTATE, POTASSIUM CHLORIDE, CALCIUM CHLORIDE 600; 310; 30; 20 MG/100ML; MG/100ML; MG/100ML; MG/100ML
INJECTION, SOLUTION INTRAVENOUS CONTINUOUS
Status: DISCONTINUED | OUTPATIENT
Start: 2019-09-03 | End: 2019-09-05 | Stop reason: HOSPADM

## 2019-09-03 RX ORDER — SODIUM CHLORIDE 0.9 % (FLUSH) 0.9 %
10 SYRINGE (ML) INJECTION EVERY 12 HOURS SCHEDULED
Status: DISCONTINUED | OUTPATIENT
Start: 2019-09-03 | End: 2019-09-03

## 2019-09-03 RX ORDER — ACETAMINOPHEN 325 MG/1
650 TABLET ORAL EVERY 4 HOURS PRN
Status: DISCONTINUED | OUTPATIENT
Start: 2019-09-03 | End: 2019-09-05 | Stop reason: HOSPADM

## 2019-09-03 RX ORDER — LIDOCAINE HYDROCHLORIDE 10 MG/ML
30 INJECTION, SOLUTION EPIDURAL; INFILTRATION; INTRACAUDAL; PERINEURAL PRN
Status: DISCONTINUED | OUTPATIENT
Start: 2019-09-03 | End: 2019-09-03 | Stop reason: HOSPADM

## 2019-09-03 RX ORDER — METHYLERGONOVINE MALEATE 0.2 MG/ML
200 INJECTION INTRAVENOUS PRN
Status: DISCONTINUED | OUTPATIENT
Start: 2019-09-03 | End: 2019-09-03 | Stop reason: HOSPADM

## 2019-09-03 RX ORDER — HYDROCODONE BITARTRATE AND ACETAMINOPHEN 5; 325 MG/1; MG/1
1 TABLET ORAL EVERY 4 HOURS PRN
Status: DISCONTINUED | OUTPATIENT
Start: 2019-09-03 | End: 2019-09-05 | Stop reason: HOSPADM

## 2019-09-03 RX ORDER — CARBOPROST TROMETHAMINE 250 UG/ML
250 INJECTION, SOLUTION INTRAMUSCULAR
Status: ACTIVE | OUTPATIENT
Start: 2019-09-03 | End: 2019-09-03

## 2019-09-03 RX ORDER — MISOPROSTOL 200 UG/1
800 TABLET ORAL
Status: ACTIVE | OUTPATIENT
Start: 2019-09-03 | End: 2019-09-03

## 2019-09-03 RX ORDER — SODIUM CHLORIDE 0.9 % (FLUSH) 0.9 %
10 SYRINGE (ML) INJECTION EVERY 12 HOURS SCHEDULED
Status: DISCONTINUED | OUTPATIENT
Start: 2019-09-03 | End: 2019-09-04

## 2019-09-03 RX ORDER — BUSPIRONE HYDROCHLORIDE 5 MG/1
5 TABLET ORAL 3 TIMES DAILY
Status: DISCONTINUED | OUTPATIENT
Start: 2019-09-03 | End: 2019-09-05 | Stop reason: HOSPADM

## 2019-09-03 RX ORDER — DOCUSATE SODIUM 100 MG/1
100 CAPSULE, LIQUID FILLED ORAL 2 TIMES DAILY PRN
Status: DISCONTINUED | OUTPATIENT
Start: 2019-09-03 | End: 2019-09-05 | Stop reason: HOSPADM

## 2019-09-03 RX ADMIN — FAMOTIDINE 20 MG: 20 TABLET, FILM COATED ORAL at 21:00

## 2019-09-03 RX ADMIN — SODIUM CHLORIDE, POTASSIUM CHLORIDE, SODIUM LACTATE AND CALCIUM CHLORIDE: 600; 310; 30; 20 INJECTION, SOLUTION INTRAVENOUS at 07:55

## 2019-09-03 RX ADMIN — SODIUM CHLORIDE, POTASSIUM CHLORIDE, SODIUM LACTATE AND CALCIUM CHLORIDE: 600; 310; 30; 20 INJECTION, SOLUTION INTRAVENOUS at 12:43

## 2019-09-03 RX ADMIN — SODIUM CHLORIDE, POTASSIUM CHLORIDE, SODIUM LACTATE AND CALCIUM CHLORIDE: 600; 310; 30; 20 INJECTION, SOLUTION INTRAVENOUS at 13:43

## 2019-09-03 RX ADMIN — Medication 16 ML/HR: at 13:50

## 2019-09-03 RX ADMIN — IBUPROFEN 800 MG: 800 TABLET, FILM COATED ORAL at 21:46

## 2019-09-03 RX ADMIN — BUSPIRONE HYDROCHLORIDE 5 MG: 5 TABLET ORAL at 21:00

## 2019-09-03 RX ADMIN — SODIUM CHLORIDE, POTASSIUM CHLORIDE, SODIUM LACTATE AND CALCIUM CHLORIDE: 600; 310; 30; 20 INJECTION, SOLUTION INTRAVENOUS at 08:44

## 2019-09-03 RX ADMIN — Medication 1 MILLI-UNITS/MIN: at 08:30

## 2019-09-03 ASSESSMENT — PAIN DESCRIPTION - DESCRIPTORS
DESCRIPTORS: CRAMPING

## 2019-09-03 ASSESSMENT — PAIN SCALES - GENERAL: PAINLEVEL_OUTOF10: 1

## 2019-09-03 NOTE — ANESTHESIA PRE PROCEDURE
Department of Anesthesiology  Preprocedure Note       Name:  Paty Thomas   Age:  34 y.o.  :  1990                                          MRN:  [id number]         Date:  9/3/2019      Surgeon: * No surgeons listed *    Procedure: Labor Analgesia    Medications prior to admission:   Prior to Admission medications    Medication Sig Start Date End Date Taking?  Authorizing Provider   busPIRone (BUSPAR) 5 MG tablet Take 1 tablet by mouth 3 times daily 19  Yes Joanie Schofield DO   ranitidine (ZANTAC) 150 MG tablet Take 150 mg by mouth 2 times daily   Yes Historical Provider, MD   Cholecalciferol (VITAMIN D) 2000 units CAPS capsule 2,000 a day for 2 weeks and then 4,000 a day  Patient taking differently: Take 5,000 Units by mouth daily  3/20/18  Yes Joanie Schofield DO   Prenatal Vit-Fe Fumarate-FA (PRENATAL COMPLETE PO) Take 1 tablet by mouth daily   Yes Historical Provider, MD   acetaminophen (TYLENOL) 325 MG tablet Take 500 mg by mouth every 6 hours as needed for Pain    Historical Provider, MD   ferrous sulfate 325 (65 Fe) MG tablet Take 325 mg by mouth daily    Historical Provider, MD   budesonide (RHINOCORT AQUA) 32 MCG/ACT nasal spray 2 sprays by Nasal route daily 19   Joanie Schofield DO   Sodium Chloride (NASAL MIST) 0.9 % AERS Inhale 2 puffs into the lungs 2 times daily    Historical Provider, MD   B Complex-C (SUPER B COMPLEX PO) Take 1 tablet by mouth daily    Historical Provider, MD       Current medications:    Current Facility-Administered Medications   Medication Dose Route Frequency Provider Last Rate Last Dose    lactated ringers infusion   Intravenous Continuous Lilly Kraft  mL/hr at 19 1343      sodium chloride flush 0.9 % injection 10 mL  10 mL Intravenous 2 times per day Lilly Kraft MD        sodium chloride flush 0.9 % injection 10 mL  10 mL Intravenous PRN iLlly Kraft MD        lidocaine PF 1 % injection 30 mL  30 mL Other PRN Nely BRUCE

## 2019-09-03 NOTE — ANESTHESIA PROCEDURE NOTES
Prep/Drape: Betadine. Skin Local: 3ml 1% lidocaine  Interspace: L4-L5   Catheter Distances:  Skin to epidural space 5cm. Catheter 10cm at skin. Aspiration for CSF/Blood: Negative    Paresthesia: Negative    Test dose: Negative (3ml 1. 5%Lidocaine with 1:200,000 Epinephrine)    Difficulties/Complications: None    Epidural Medication:  Bolus Dose:   Premixed Syringe: Ropivacaine 0.2% 0ml given. Injection was made incrementally with constant monitoring and aspiration. Infusion Dose:  Premixed Bag: Ropivacaine 0.1% with Fentanyl 3 mcg/ml started at 16 ml/hr. Catheter bolused with 15 mL from premixed bag.     Chacorta Leonard MD (ATTENDING ANESTHESIOLOGIST: Imm)    1:53 PM

## 2019-09-03 NOTE — FLOWSHEET NOTE
Dr. Marie Tony called for update. Informed of pt's labor status and last vag. Exam. Request pt. Be checked at this time. Will hold on phone line.

## 2019-09-04 LAB
ABO: NORMAL
ANTIBODY SCREEN: NORMAL
GESTATIONAL AGE(WEEKS): NORMAL
RH FACTOR: NORMAL

## 2019-09-04 PROCEDURE — 2709999900 HC NON-CHARGEABLE SUPPLY

## 2019-09-04 PROCEDURE — 1220000000 HC SEMI PRIVATE OB R&B

## 2019-09-04 PROCEDURE — 6370000000 HC RX 637 (ALT 250 FOR IP): Performed by: OBSTETRICS & GYNECOLOGY

## 2019-09-04 RX ORDER — RANITIDINE 150 MG/1
150 TABLET ORAL 2 TIMES DAILY
Status: DISCONTINUED | OUTPATIENT
Start: 2019-09-04 | End: 2019-09-05 | Stop reason: HOSPADM

## 2019-09-04 RX ADMIN — IBUPROFEN 800 MG: 800 TABLET, FILM COATED ORAL at 15:18

## 2019-09-04 RX ADMIN — BUSPIRONE HYDROCHLORIDE 5 MG: 5 TABLET ORAL at 08:34

## 2019-09-04 RX ADMIN — RANITIDINE 150 MG: 150 TABLET ORAL at 21:12

## 2019-09-04 RX ADMIN — DOCUSATE SODIUM 100 MG: 100 CAPSULE, LIQUID FILLED ORAL at 08:38

## 2019-09-04 RX ADMIN — IBUPROFEN 800 MG: 800 TABLET, FILM COATED ORAL at 06:06

## 2019-09-04 RX ADMIN — BUSPIRONE HYDROCHLORIDE 5 MG: 5 TABLET ORAL at 21:12

## 2019-09-04 RX ADMIN — RANITIDINE 150 MG: 150 TABLET ORAL at 09:00

## 2019-09-04 ASSESSMENT — PAIN SCALES - GENERAL
PAINLEVEL_OUTOF10: 2
PAINLEVEL_OUTOF10: 1

## 2019-09-05 VITALS
HEART RATE: 96 BPM | TEMPERATURE: 98 F | OXYGEN SATURATION: 97 % | BODY MASS INDEX: 32.57 KG/M2 | DIASTOLIC BLOOD PRESSURE: 65 MMHG | WEIGHT: 177 LBS | RESPIRATION RATE: 17 BRPM | HEIGHT: 62 IN | SYSTOLIC BLOOD PRESSURE: 107 MMHG

## 2019-09-05 PROCEDURE — 6360000002 HC RX W HCPCS: Performed by: OBSTETRICS & GYNECOLOGY

## 2019-09-05 PROCEDURE — 6370000000 HC RX 637 (ALT 250 FOR IP): Performed by: OBSTETRICS & GYNECOLOGY

## 2019-09-05 RX ADMIN — BUSPIRONE HYDROCHLORIDE 5 MG: 5 TABLET ORAL at 10:06

## 2019-09-05 RX ADMIN — DOCUSATE SODIUM 100 MG: 100 CAPSULE, LIQUID FILLED ORAL at 09:22

## 2019-09-05 RX ADMIN — HUMAN RHO(D) IMMUNE GLOBULIN 300 MCG: 300 INJECTION, SOLUTION INTRAMUSCULAR at 09:36

## 2019-09-05 RX ADMIN — IBUPROFEN 800 MG: 800 TABLET, FILM COATED ORAL at 00:06

## 2019-09-05 RX ADMIN — IBUPROFEN 800 MG: 800 TABLET, FILM COATED ORAL at 09:22

## 2019-09-05 ASSESSMENT — PAIN SCALES - GENERAL
PAINLEVEL_OUTOF10: 1
PAINLEVEL_OUTOF10: 1

## 2019-09-05 NOTE — PLAN OF CARE
Problem: Discharge Planning:  Goal: Discharged to appropriate level of care  Description  Discharged to appropriate level of care  Outcome: Ongoing  Note:   Patient to be discharged to private residence     Problem: Constipation:  Goal: Bowel elimination is within specified parameters  Description  Bowel elimination is within specified parameters  Outcome: Ongoing  Note:   Taking stool softeners and increasing fiber and fluid in diet. Ambulation encouraged. Problem: Fluid Volume - Imbalance:  Goal: Absence of postpartum hemorrhage signs and symptoms  Description  Absence of postpartum hemorrhage signs and symptoms  Outcome: Ongoing  Note:   Vaginal bleeding WNL, no clots or foul odors. Problem: Mood - Altered:  Goal: Mood stable  Description  Mood stable  Outcome: Ongoing  Note:   Bonding with baby, participating in infant care. Problem: Pain - Acute:  Goal: Pain level will decrease  Description  Pain level will decrease  Outcome: Ongoing  Note:   Pain controlled with po meds. Discussed ice for perineal pain or the use of warm blanket/heating pad for uterine cramps. Pt states her pain goal 4/10 has been met.

## 2019-09-05 NOTE — PROGRESS NOTES
Department of Obstetrics and Gynecology  Labor and Delivery  Attending Post Partum Progress Note    PPD #2    SUBJECTIVE: Feeling well    OBJECTIVE:     Vitals:  /71   Pulse 65   Temp 97.1 °F (36.2 °C) (Oral)   Resp 18   Ht 5' 1.5\" (1.562 m)   Wt 177 lb (80.3 kg)   LMP 09/29/2018 (Exact Date)   SpO2 97%   Breastfeeding? Unknown   BMI 32.90 kg/m²     Uterus:  normal size, well involuted, firm, non-tender    DATA:     No results found for this or any previous visit (from the past 24 hour(s)). ASSESSMENT & PLAN:  Doing well. Plan discharge on day 2.     Electronically signed by Kin Sommers MD on 9/5/2019 at 9:10 AM

## 2019-09-12 ENCOUNTER — NURSE ONLY (OUTPATIENT)
Dept: LAB | Age: 29
End: 2019-09-12

## 2019-09-12 LAB
ALBUMIN SERPL-MCNC: 3.8 G/DL (ref 3.5–5.1)
ALP BLD-CCNC: 98 U/L (ref 38–126)
ALT SERPL-CCNC: 19 U/L (ref 11–66)
ANION GAP SERPL CALCULATED.3IONS-SCNC: 12 MEQ/L (ref 8–16)
AST SERPL-CCNC: 15 U/L (ref 5–40)
BASOPHILS # BLD: 0.2 %
BASOPHILS ABSOLUTE: 0 THOU/MM3 (ref 0–0.1)
BILIRUB SERPL-MCNC: < 0.2 MG/DL (ref 0.3–1.2)
BUN BLDV-MCNC: 11 MG/DL (ref 7–22)
CALCIUM SERPL-MCNC: 9.8 MG/DL (ref 8.5–10.5)
CHLORIDE BLD-SCNC: 106 MEQ/L (ref 98–111)
CO2: 24 MEQ/L (ref 23–33)
CREAT SERPL-MCNC: 0.6 MG/DL (ref 0.4–1.2)
CREATININE URINE: 14.7 MG/DL
EOSINOPHIL # BLD: 2.5 %
EOSINOPHILS ABSOLUTE: 0.2 THOU/MM3 (ref 0–0.4)
ERYTHROCYTE [DISTWIDTH] IN BLOOD BY AUTOMATED COUNT: 12.8 % (ref 11.5–14.5)
ERYTHROCYTE [DISTWIDTH] IN BLOOD BY AUTOMATED COUNT: 46.3 FL (ref 35–45)
GFR SERPL CREATININE-BSD FRML MDRD: > 90 ML/MIN/1.73M2
GLUCOSE BLD-MCNC: 95 MG/DL (ref 70–108)
HCT VFR BLD CALC: 39.6 % (ref 37–47)
HEMOGLOBIN: 12.7 GM/DL (ref 12–16)
IMMATURE GRANS (ABS): 0.03 THOU/MM3 (ref 0–0.07)
IMMATURE GRANULOCYTES: 0 %
LYMPHOCYTES # BLD: 18.7 %
LYMPHOCYTES ABSOLUTE: 1.6 THOU/MM3 (ref 1–4.8)
MCH RBC QN AUTO: 31.6 PG (ref 26–33)
MCHC RBC AUTO-ENTMCNC: 32.1 GM/DL (ref 32.2–35.5)
MCV RBC AUTO: 98.5 FL (ref 81–99)
MONOCYTES # BLD: 6.1 %
MONOCYTES ABSOLUTE: 0.5 THOU/MM3 (ref 0.4–1.3)
NUCLEATED RED BLOOD CELLS: 0 /100 WBC
PLATELET # BLD: 186 THOU/MM3 (ref 130–400)
PMV BLD AUTO: 11.5 FL (ref 9.4–12.4)
POTASSIUM SERPL-SCNC: 4.4 MEQ/L (ref 3.5–5.2)
PROT/CREAT RATIO, UR: 0.54
PROTEIN, URINE: 7.9 MG/DL
RBC # BLD: 4.02 MILL/MM3 (ref 4.2–5.4)
SEG NEUTROPHILS: 72.1 %
SEGMENTED NEUTROPHILS ABSOLUTE COUNT: 6.1 THOU/MM3 (ref 1.8–7.7)
SODIUM BLD-SCNC: 142 MEQ/L (ref 135–145)
TOTAL PROTEIN: 6.4 G/DL (ref 6.1–8)
WBC # BLD: 8.5 THOU/MM3 (ref 4.8–10.8)

## 2019-09-29 DIAGNOSIS — F41.9 ANXIETY: ICD-10-CM

## 2019-10-03 RX ORDER — BUSPIRONE HYDROCHLORIDE 5 MG/1
TABLET ORAL
Qty: 270 TABLET | Refills: 1 | Status: SHIPPED | OUTPATIENT
Start: 2019-10-03 | End: 2020-03-26

## 2019-10-24 ENCOUNTER — OFFICE VISIT (OUTPATIENT)
Dept: FAMILY MEDICINE CLINIC | Age: 29
End: 2019-10-24
Payer: COMMERCIAL

## 2019-10-24 VITALS
DIASTOLIC BLOOD PRESSURE: 64 MMHG | WEIGHT: 160 LBS | RESPIRATION RATE: 12 BRPM | HEART RATE: 70 BPM | BODY MASS INDEX: 29.44 KG/M2 | OXYGEN SATURATION: 100 % | HEIGHT: 62 IN | SYSTOLIC BLOOD PRESSURE: 110 MMHG

## 2019-10-24 DIAGNOSIS — E55.9 VITAMIN D DEFICIENCY: ICD-10-CM

## 2019-10-24 DIAGNOSIS — Z00.00 WELLNESS EXAMINATION: Primary | ICD-10-CM

## 2019-10-24 DIAGNOSIS — K21.9 GASTROESOPHAGEAL REFLUX DISEASE WITHOUT ESOPHAGITIS: ICD-10-CM

## 2019-10-24 DIAGNOSIS — Z13.220 SCREENING FOR HYPERLIPIDEMIA: ICD-10-CM

## 2019-10-24 DIAGNOSIS — Z13.1 SCREENING FOR DIABETES MELLITUS: ICD-10-CM

## 2019-10-24 DIAGNOSIS — Z28.39 INCOMPLETE IMMUNIZATION STATUS: ICD-10-CM

## 2019-10-24 PROCEDURE — 99385 PREV VISIT NEW AGE 18-39: CPT | Performed by: NURSE PRACTITIONER

## 2019-10-24 RX ORDER — SUCRALFATE ORAL 1 G/10ML
1 SUSPENSION ORAL 4 TIMES DAILY
Qty: 1200 ML | Refills: 3 | Status: SHIPPED | OUTPATIENT
Start: 2019-10-24 | End: 2020-02-04 | Stop reason: ALTCHOICE

## 2019-10-24 RX ORDER — MULTIVIT-MIN/IRON/FOLIC ACID/K 18-600-40
5000 CAPSULE ORAL DAILY
Status: CANCELLED | OUTPATIENT
Start: 2019-10-24

## 2019-10-24 RX ORDER — BUSPIRONE HYDROCHLORIDE 5 MG/1
TABLET ORAL
Qty: 270 TABLET | Refills: 1 | Status: CANCELLED | OUTPATIENT
Start: 2019-10-24

## 2019-10-24 ASSESSMENT — ENCOUNTER SYMPTOMS
CONSTIPATION: 0
SHORTNESS OF BREATH: 0
EYE REDNESS: 0
ABDOMINAL PAIN: 0
COUGH: 0
ANAL BLEEDING: 0
NAUSEA: 0
ABDOMINAL DISTENTION: 0
SORE THROAT: 0
EYE DISCHARGE: 0
COLOR CHANGE: 0
DIARRHEA: 0
RHINORRHEA: 0
BLOOD IN STOOL: 0

## 2019-10-24 ASSESSMENT — PATIENT HEALTH QUESTIONNAIRE - PHQ9
SUM OF ALL RESPONSES TO PHQ QUESTIONS 1-9: 0
SUM OF ALL RESPONSES TO PHQ9 QUESTIONS 1 & 2: 0
1. LITTLE INTEREST OR PLEASURE IN DOING THINGS: 0
2. FEELING DOWN, DEPRESSED OR HOPELESS: 0
SUM OF ALL RESPONSES TO PHQ QUESTIONS 1-9: 0

## 2019-11-01 ENCOUNTER — NURSE ONLY (OUTPATIENT)
Dept: LAB | Age: 29
End: 2019-11-01

## 2019-11-01 DIAGNOSIS — Z13.1 SCREENING FOR DIABETES MELLITUS: ICD-10-CM

## 2019-11-01 DIAGNOSIS — Z13.220 SCREENING FOR HYPERLIPIDEMIA: ICD-10-CM

## 2019-11-01 DIAGNOSIS — Z28.39 INCOMPLETE IMMUNIZATION STATUS: ICD-10-CM

## 2019-11-01 LAB
CHOLESTEROL, TOTAL: 194 MG/DL (ref 100–199)
GLUCOSE FASTING: 84 MG/DL (ref 70–108)
HDLC SERPL-MCNC: 79 MG/DL
LDL CHOLESTEROL CALCULATED: 107 MG/DL
TRIGL SERPL-MCNC: 40 MG/DL (ref 0–199)

## 2019-11-04 LAB — VZV IGG SER QL IA: 2.05

## 2019-11-05 LAB — VARICELLA ZOSTER AB IGM: 0.09 ISR

## 2019-11-07 ENCOUNTER — TELEPHONE (OUTPATIENT)
Dept: FAMILY MEDICINE CLINIC | Age: 29
End: 2019-11-07

## 2019-11-14 ENCOUNTER — NURSE ONLY (OUTPATIENT)
Dept: LAB | Age: 29
End: 2019-11-14

## 2019-11-15 LAB — HELICOBACTER PYLORI ANTIGEN, STOOL: NORMAL

## 2019-11-26 ENCOUNTER — HOSPITAL ENCOUNTER (OUTPATIENT)
Dept: NUCLEAR MEDICINE | Age: 29
Discharge: HOME | End: 2019-11-26
Payer: COMMERCIAL

## 2019-11-26 ENCOUNTER — HOSPITAL ENCOUNTER (OUTPATIENT)
Dept: ULTRASOUND IMAGING | Age: 29
Discharge: HOME | End: 2019-11-26
Payer: COMMERCIAL

## 2019-11-26 DIAGNOSIS — K21.9 CHRONIC GERD: ICD-10-CM

## 2019-11-26 DIAGNOSIS — K21.9 GASTROESOPHAGEAL REFLUX DISEASE WITHOUT ESOPHAGITIS: ICD-10-CM

## 2019-11-26 PROCEDURE — 93975 VASCULAR STUDY: CPT

## 2019-11-26 PROCEDURE — 78264 GASTRIC EMPTYING IMG STUDY: CPT

## 2019-11-26 PROCEDURE — 76705 ECHO EXAM OF ABDOMEN: CPT

## 2019-11-26 PROCEDURE — A9541 TC99M SULFUR COLLOID: HCPCS | Performed by: INTERNAL MEDICINE

## 2019-11-26 PROCEDURE — 3430000000 HC RX DIAGNOSTIC RADIOPHARMACEUTICAL: Performed by: INTERNAL MEDICINE

## 2019-11-26 RX ADMIN — Medication 990 MICRO CURIE: at 08:10

## 2019-12-16 ENCOUNTER — OFFICE VISIT (OUTPATIENT)
Dept: NEUROLOGY | Age: 29
End: 2019-12-16
Payer: COMMERCIAL

## 2019-12-16 VITALS
DIASTOLIC BLOOD PRESSURE: 62 MMHG | WEIGHT: 160 LBS | SYSTOLIC BLOOD PRESSURE: 118 MMHG | HEIGHT: 62 IN | BODY MASS INDEX: 29.44 KG/M2 | HEART RATE: 66 BPM

## 2019-12-16 DIAGNOSIS — G43.109 MIGRAINE WITH AURA AND WITHOUT STATUS MIGRAINOSUS, NOT INTRACTABLE: Primary | ICD-10-CM

## 2019-12-16 PROCEDURE — 99213 OFFICE O/P EST LOW 20 MIN: CPT | Performed by: PSYCHIATRY & NEUROLOGY

## 2019-12-16 RX ORDER — ZOLMITRIPTAN 5 MG/1
2.5 TABLET, FILM COATED ORAL DAILY PRN
Qty: 9 TABLET | Refills: 3 | Status: SHIPPED | OUTPATIENT
Start: 2019-12-16 | End: 2019-12-16

## 2019-12-16 RX ORDER — ZOLMITRIPTAN 2.5 MG/1
2.5 TABLET, FILM COATED ORAL DAILY PRN
Qty: 9 TABLET | Refills: 3 | Status: SHIPPED | OUTPATIENT
Start: 2019-12-16 | End: 2020-12-04 | Stop reason: SDUPTHER

## 2020-02-04 ENCOUNTER — NURSE ONLY (OUTPATIENT)
Dept: LAB | Age: 30
End: 2020-02-04

## 2020-02-04 ENCOUNTER — OFFICE VISIT (OUTPATIENT)
Dept: FAMILY MEDICINE CLINIC | Age: 30
End: 2020-02-04
Payer: COMMERCIAL

## 2020-02-04 VITALS
HEIGHT: 61 IN | WEIGHT: 146.8 LBS | OXYGEN SATURATION: 98 % | TEMPERATURE: 97.9 F | HEART RATE: 70 BPM | BODY MASS INDEX: 27.72 KG/M2 | DIASTOLIC BLOOD PRESSURE: 70 MMHG | SYSTOLIC BLOOD PRESSURE: 106 MMHG | RESPIRATION RATE: 16 BRPM

## 2020-02-04 PROBLEM — G43.109 MIGRAINE WITH AURA AND WITHOUT STATUS MIGRAINOSUS: Status: ACTIVE | Noted: 2020-02-04

## 2020-02-04 LAB
INFLUENZA VIRUS A RNA: NORMAL
INFLUENZA VIRUS B RNA: NORMAL
MAGNESIUM: 2.3 MG/DL (ref 1.6–2.4)
VITAMIN D 25-HYDROXY: 57 NG/ML (ref 30–100)

## 2020-02-04 PROCEDURE — 99214 OFFICE O/P EST MOD 30 MIN: CPT | Performed by: NURSE PRACTITIONER

## 2020-02-04 PROCEDURE — 87502 INFLUENZA DNA AMP PROBE: CPT | Performed by: NURSE PRACTITIONER

## 2020-02-04 RX ORDER — ALBUTEROL SULFATE 90 UG/1
2 AEROSOL, METERED RESPIRATORY (INHALATION) 4 TIMES DAILY PRN
Qty: 3 INHALER | Refills: 1 | Status: SHIPPED | OUTPATIENT
Start: 2020-02-04 | End: 2020-09-10

## 2020-02-04 RX ORDER — IBUPROFEN 800 MG/1
800 TABLET ORAL PRN
COMMUNITY
Start: 2019-09-03 | End: 2021-07-29 | Stop reason: ALTCHOICE

## 2020-02-04 RX ORDER — FAMOTIDINE 40 MG/1
TABLET, FILM COATED ORAL 2 TIMES DAILY
COMMUNITY
Start: 2019-12-31 | End: 2021-06-15 | Stop reason: ALTCHOICE

## 2020-02-04 RX ORDER — DOCUSATE SODIUM 100 MG/1
100 CAPSULE, LIQUID FILLED ORAL
COMMUNITY
Start: 2019-09-03 | End: 2020-09-10

## 2020-02-04 SDOH — ECONOMIC STABILITY: FOOD INSECURITY: WITHIN THE PAST 12 MONTHS, YOU WORRIED THAT YOUR FOOD WOULD RUN OUT BEFORE YOU GOT MONEY TO BUY MORE.: NEVER TRUE

## 2020-02-04 SDOH — ECONOMIC STABILITY: TRANSPORTATION INSECURITY
IN THE PAST 12 MONTHS, HAS LACK OF TRANSPORTATION KEPT YOU FROM MEETINGS, WORK, OR FROM GETTING THINGS NEEDED FOR DAILY LIVING?: NO

## 2020-02-04 SDOH — ECONOMIC STABILITY: TRANSPORTATION INSECURITY
IN THE PAST 12 MONTHS, HAS THE LACK OF TRANSPORTATION KEPT YOU FROM MEDICAL APPOINTMENTS OR FROM GETTING MEDICATIONS?: NO

## 2020-02-04 SDOH — ECONOMIC STABILITY: FOOD INSECURITY: WITHIN THE PAST 12 MONTHS, THE FOOD YOU BOUGHT JUST DIDN'T LAST AND YOU DIDN'T HAVE MONEY TO GET MORE.: NEVER TRUE

## 2020-02-04 SDOH — ECONOMIC STABILITY: INCOME INSECURITY: HOW HARD IS IT FOR YOU TO PAY FOR THE VERY BASICS LIKE FOOD, HOUSING, MEDICAL CARE, AND HEATING?: NOT HARD AT ALL

## 2020-02-04 ASSESSMENT — PATIENT HEALTH QUESTIONNAIRE - PHQ9
SUM OF ALL RESPONSES TO PHQ QUESTIONS 1-9: 0
1. LITTLE INTEREST OR PLEASURE IN DOING THINGS: 0
2. FEELING DOWN, DEPRESSED OR HOPELESS: 0
SUM OF ALL RESPONSES TO PHQ9 QUESTIONS 1 & 2: 0
SUM OF ALL RESPONSES TO PHQ QUESTIONS 1-9: 0

## 2020-02-04 ASSESSMENT — ENCOUNTER SYMPTOMS
NAUSEA: 0
COUGH: 1
SHORTNESS OF BREATH: 1
TROUBLE SWALLOWING: 0
DIARRHEA: 0
SORE THROAT: 1
CONSTIPATION: 0
ABDOMINAL PAIN: 0
RHINORRHEA: 1

## 2020-02-04 NOTE — ADDENDUM NOTE
Addended by: Mauricio Spring on: 2/4/2020 02:26 PM     Modules accepted: Orders
Addended by: Rashard Merchant on: 2/4/2020 02:23 PM     Modules accepted: Orders
yes

## 2020-02-04 NOTE — PROGRESS NOTES
[facility] [facility]  [phone] (phone)  [phone] (fax)    Visit Date: 2/4/2020    Katelyn Musa is a 34 y.o. female who presents today for:  Chief Complaint   Patient presents with    Cough     shortness of breath, nasal congestion, headache, sore throat, and post nasal drainage x5 days         HPI:   Pt feels like she can not get a deep enough breath sometimes. Has dry cough and gets coughing fits with dry chunks of mucus. Not sure about the color of the mucus (did not look)    No fevers. Body aches and exhausted. Not sure if it is from being sick or taking care of two sick children. Has lost sleep taking care of them.   -has tried cough medicine and cough drops  -is breastfeeding so does not want to take too much medications    GERD  -no improvement  -taking Prilosex  -completed testes with GI-negative  -will follow with GI    US gallbladder     Impression   1. Unremarkable sonographic evaluation of the liver. 2. Unremarkable hepatic Doppler examination. 3. Unremarkable sonographic evaluation of the gallbladder.               **This report has been created using voice recognition software.  It may contain minor errors which are inherent in voice recognition technology. **       Final report electronically signed by Dr. Latrice Hawkins on 11/26/2019 9:15 AM           US liver     Impression   1. Unremarkable sonographic evaluation of the liver. 2. Unremarkable hepatic Doppler examination. 3. Unremarkable sonographic evaluation of the gallbladder.               **This report has been created using voice recognition software.  It may contain minor errors which are inherent in voice recognition technology. **       Final report electronically signed by Dr. Latrice Hawkins on 11/26/2019 9:15 AM           Gastric Emptying  Impression   No scintigraphic evidence of delayed gastric emptying.                   **This report has been created using voice recognition software.  It may contain minor errors (72.6 kg)     BP Readings from Last 3 Encounters:   02/04/20 106/70   12/16/19 118/62   10/24/19 110/64       Physical Exam  Vitals signs and nursing note reviewed. Constitutional:       General: She is not in acute distress. Appearance: She is well-developed. She is not diaphoretic. HENT:      Head: Normocephalic and atraumatic. Eyes:      General:         Right eye: No discharge. Left eye: No discharge. Conjunctiva/sclera: Conjunctivae normal.   Neck:      Musculoskeletal: Normal range of motion and neck supple. Trachea: No tracheal deviation. Pulmonary:      Effort: Pulmonary effort is normal. No respiratory distress. Musculoskeletal: Normal range of motion. Skin:     General: Skin is warm and dry. Neurological:      Mental Status: She is alert and oriented to person, place, and time. Psychiatric:         Behavior: Behavior normal.         Thought Content: Thought content normal.         Judgment: Judgment normal.         Lab Results   Component Value Date    WBC 8.5 09/12/2019    HGB 12.7 09/12/2019    HCT 39.6 09/12/2019     09/12/2019    CHOL 194 11/01/2019    TRIG 40 11/01/2019    HDL 79 11/01/2019    LDLCALC 107 11/01/2019    AST 15 09/12/2019     09/12/2019    K 4.4 09/12/2019     09/12/2019    CREATININE 0.6 09/12/2019    BUN 11 09/12/2019    CO2 24 09/12/2019    TSH 0.73 02/12/2019    GLUF 84 11/01/2019    LABA1C 5.0 10/21/2015    LABGLOM >90 09/12/2019    MG 2.0 10/21/2015    CALCIUM 9.8 09/12/2019    VITD25 29 (L) 10/21/2015       Assessment:       Diagnosis Orders   1. Viral URI  Magnesium    Vitamin D 25 Hydroxy   2. Vitamin D deficiency  Magnesium    Vitamin D 25 Hydroxy   3. Intestinal malabsorption, unspecified type  Magnesium       Plan:   1. Viral URI    - Magnesium; Future  - Vitamin D 25 Hydroxy; Future    2. Vitamin D deficiency    - Magnesium; Future  - Vitamin D 25 Hydroxy; Future    3.  Intestinal malabsorption, unspecified type    -

## 2020-02-04 NOTE — PATIENT INSTRUCTIONS
Take  -vitamin C at least 1000 mg 4 times a day  -zinc at least 75 mg daily  -Albuterol for shortness of breath    · Lots of fluids - half of you body weight in ounces daily  · Get plenty of rest  · Increase fluids  · Avoid secondhand smoke  · Can use the Radha Pot to rinse the sinuses as needed  · Cool-mist humidifier at night   · Mucinex or Robitussin liquid for the cough, take with lots of water  · OTC decongestant for 3-4 days to relieve congestion  · Return to office  if symptoms do not start to improve over the 7-10 days  · Call for appointment if symptoms persist or if develops new symptoms such as wheezing or shortness of breath.

## 2020-02-06 PROBLEM — K21.9 GASTROESOPHAGEAL REFLUX DISEASE: Status: ACTIVE | Noted: 2020-02-06

## 2020-03-26 RX ORDER — BUSPIRONE HYDROCHLORIDE 5 MG/1
TABLET ORAL
Qty: 270 TABLET | Refills: 1 | Status: SHIPPED | OUTPATIENT
Start: 2020-03-26 | End: 2020-09-10 | Stop reason: SDUPTHER

## 2020-03-26 NOTE — TELEPHONE ENCOUNTER
Last visit- 2/4/2020  Next visit- 4/30/2020 with Dr. Yael Alcala    Requested Prescriptions     Pending Prescriptions Disp Refills    busPIRone (BUSPAR) 5 MG tablet [Pharmacy Med Name: BUSPIRONE HCL 5 MG TABLET] 270 tablet 1     Sig: TAKE 1 TABLET BY MOUTH THREE TIMES A DAY     Please review, approve or deny

## 2020-04-16 ENCOUNTER — TELEPHONE (OUTPATIENT)
Dept: FAMILY MEDICINE CLINIC | Age: 30
End: 2020-04-16

## 2020-04-30 ENCOUNTER — TELEMEDICINE (OUTPATIENT)
Dept: FAMILY MEDICINE CLINIC | Age: 30
End: 2020-04-30
Payer: COMMERCIAL

## 2020-04-30 PROBLEM — F41.9 ANXIETY: Status: ACTIVE | Noted: 2020-04-30

## 2020-04-30 PROCEDURE — 99214 OFFICE O/P EST MOD 30 MIN: CPT | Performed by: FAMILY MEDICINE

## 2020-04-30 ASSESSMENT — ENCOUNTER SYMPTOMS
NAUSEA: 0
EYE PAIN: 0
TROUBLE SWALLOWING: 0
BLOOD IN STOOL: 0
VOMITING: 0
CONSTIPATION: 1
SHORTNESS OF BREATH: 0
COUGH: 0
DIARRHEA: 0
ABDOMINAL PAIN: 0

## 2020-04-30 NOTE — PATIENT INSTRUCTIONS
Will continue to follow up with GI    Will stay on the prilosec    Will keep an eye on the shortness of breath - will do the peak flow next time we see you in the office    Will slowly go on some magnesium daily and then go up to 2 times a day for headaches and constipation    Mag oxide 250mg a few times    Will keep up the anxiety medication 3 times a day     Will see you back in september
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
## 2020-05-11 ENCOUNTER — E-VISIT (OUTPATIENT)
Dept: FAMILY MEDICINE CLINIC | Age: 30
End: 2020-05-11
Payer: COMMERCIAL

## 2020-05-11 PROCEDURE — 99421 OL DIG E/M SVC 5-10 MIN: CPT | Performed by: FAMILY MEDICINE

## 2020-05-12 NOTE — PROGRESS NOTES
Thanks for filling out the evisit form.     If it is getting worse - can we add some zoloft 50mg and see you again in the next 4 to 6 weeks to see how it is going?     Sent it to [facility] on [address].     Will call to hopefully set up the appointment before the September 10th appointment - but maybe keep that one also?     Let us know how you are doing if it is not better in the next 2 weeks.     Dr Coley Bones    Can you call to set her up to see us in the next month? Thanks!

## 2020-06-01 ENCOUNTER — PATIENT MESSAGE (OUTPATIENT)
Dept: FAMILY MEDICINE CLINIC | Age: 30
End: 2020-06-01

## 2020-06-03 NOTE — TELEPHONE ENCOUNTER
Last visit- 2/4/2020  Next visit- 9/10/2020 with Dr. Patricia Og    Requested Prescriptions     Pending Prescriptions Disp Refills    sertraline (ZOLOFT) 50 MG tablet [Pharmacy Med Name: SERTRALINE HCL 50 MG TABLET] 30 tablet 0     Sig: TAKE 1 TABLET BY MOUTH EVERY DAY       Please review, approve or deny.  Dr. Patricia Og is out of the office till 6/8/2020

## 2020-06-10 RX ORDER — SERTRALINE HYDROCHLORIDE 25 MG/1
25 TABLET, FILM COATED ORAL DAILY
Qty: 90 TABLET | Refills: 1 | Status: SHIPPED | OUTPATIENT
Start: 2020-06-10 | End: 2020-06-15

## 2020-07-01 ENCOUNTER — TELEMEDICINE (OUTPATIENT)
Dept: FAMILY MEDICINE CLINIC | Age: 30
End: 2020-07-01
Payer: COMMERCIAL

## 2020-07-01 PROCEDURE — 99213 OFFICE O/P EST LOW 20 MIN: CPT | Performed by: NURSE PRACTITIONER

## 2020-07-01 RX ORDER — DICLOXACILLIN SODIUM 250 MG/1
250 CAPSULE ORAL 4 TIMES DAILY
Qty: 40 CAPSULE | Refills: 0 | Status: SHIPPED | OUTPATIENT
Start: 2020-07-01 | End: 2020-07-07 | Stop reason: SINTOL

## 2020-07-01 ASSESSMENT — ENCOUNTER SYMPTOMS
ABDOMINAL PAIN: 0
VOMITING: 0
SORE THROAT: 0
EYE PAIN: 0
ABDOMINAL DISTENTION: 0
NAUSEA: 0
COUGH: 0
BLOOD IN STOOL: 0
PHOTOPHOBIA: 0
SHORTNESS OF BREATH: 0
EYE DISCHARGE: 0
TROUBLE SWALLOWING: 0
DIARRHEA: 0
CONSTIPATION: 0

## 2020-07-01 NOTE — PROGRESS NOTES
2020    TELEHEALTH EVALUATION -- Audio/Visual (During UMKBQ-20 public health emergency)  Patient gave consent for synchronous telecommunication visit   I was present in my home utilizing [employer], patient was in their home. Visit started at   11:54 AM EDT    HPI:    Marie Lincoln (:  1990) has requested an audio/video evaluation for the following concern(s):    HPI     2 days ago had a clogged milk duck in left breast at 8 o'clock godwin. Has pain, redness, feels hard. Today feels cold and had 100.2 fever in the morning. Did not sleep well because she was uncomfortable. Trying to wean off breast milk. Tried heat, ice, massage, tylenol, ibuprofen. It happened with her first child as well. Depression  PHQ Scores 2020 10/24/2019 2018 3/20/2018   PHQ2 Score 0 0 1 1   PHQ9 Score 0 0 5 1     Review of Systems   Constitutional: Positive for chills and fever. Negative for fatigue and unexpected weight change. HENT: Negative for congestion, ear discharge, ear pain, hearing loss, postnasal drip, sneezing, sore throat and trouble swallowing. Eyes: Negative for photophobia, pain and discharge. Respiratory: Negative for cough and shortness of breath. Cardiovascular: Negative for chest pain and palpitations. Gastrointestinal: Negative for abdominal distention, abdominal pain, blood in stool, constipation, diarrhea, nausea and vomiting. Genitourinary: Negative for difficulty urinating, dysuria, frequency, hematuria and urgency. Musculoskeletal: Negative for arthralgias, joint swelling, neck pain and neck stiffness. Skin: Negative for rash and wound. Left breast mass, redness, swelling 8 o'clock   Neurological: Negative for dizziness and headaches. Hematological: Negative for adenopathy. Psychiatric/Behavioral: Positive for sleep disturbance. Negative for agitation. Prior to Visit Medications    Medication Sig Taking?  Authorizing Provider   dicloxacillin (DYNAPEN) 250 MG capsule Take 1 capsule by mouth 4 times daily for 10 days Yes Tobi Alston, APRN - CNP   sertraline (ZOLOFT) 50 MG tablet Take 1 tablet by mouth daily  Marni Simmons DO   busPIRone (BUSPAR) 5 MG tablet TAKE 1 TABLET BY MOUTH THREE TIMES A DAY  Marni Simmons DO   docusate sodium (COLACE) 100 MG capsule Take 100 mg by mouth  Historical Provider, MD   famotidine (PEPCID) 40 MG tablet 2 times daily  Historical Provider, MD   HYDROCODONE-ACETAMINOPHEN PO Take 1 tablet by mouth as needed  Historical Provider, MD   ibuprofen (ADVIL;MOTRIN) 800 MG tablet Take 800 mg by mouth as needed  Historical Provider, MD   albuterol sulfate  (90 Base) MCG/ACT inhaler Inhale 2 puffs into the lungs 4 times daily as needed for Wheezing  Yanesofiya AlstonMELANY CNP   ZOLMitriptan (ZOMIG) 2.5 MG tablet Take 1 tablet by mouth daily as needed for Migraine  Narvis Yg, MD   Omeprazole (PRILOSEC PO) Take 40 mg by mouth daily   Historical Provider, MD   acetaminophen (TYLENOL) 325 MG tablet Take 500 mg by mouth every 6 hours as needed for Pain  Historical Provider, MD   Sodium Chloride (NASAL MIST) 0.9 % AERS Inhale 2 puffs into the lungs 2 times daily  Historical Provider, MD   B Complex-C (SUPER B COMPLEX PO) Take 1 tablet by mouth daily  Historical Provider, MD   Cholecalciferol (VITAMIN D) 2000 units CAPS capsule 2,000 a day for 2 weeks and then 4,000 a day  Patient taking differently: Take 5,000 Units by mouth daily   Marni Simmons DO   Prenatal Vit-Fe Fumarate-FA (PRENATAL COMPLETE PO) Take 1 tablet by mouth daily  Historical Provider, MD       Social History     Tobacco Use    Smoking status: Never Smoker    Smokeless tobacco: Never Used   Substance Use Topics    Alcohol use:  Yes     Alcohol/week: 0.0 standard drinks     Comment: occas/social    Drug use: No        PHYSICAL EXAMINATION:  [ INSTRUCTIONS:  \"[x]\" Indicates a positive item  \"[]\" Indicates a negative item  -- DELETE ALL ITEMS NOT EXAMINED]  Vital Signs: (As obtained by patient/caregiver or practitioner observation)    Constitutional: [x] Appears well-developed and well-nourished [x] No apparent distress      [] Abnormal-   Mental status  [x] Alert and awake  [x] Oriented to person/place/time [x]Able to follow commands      Eyes:  EOM    [x]  Normal  [] Abnormal-  Sclera  [x]  Normal  [] Abnormal -         Discharge [x]  None visible  [] Abnormal -    HENT:   [x] Normocephalic, atraumatic. [] Abnormal   [x] Mouth/Throat: Mucous membranes are moist.     External Ears [x] Normal  [] Abnormal-     Neck: [x] No visualized mass     Pulmonary/Chest: [x] Respiratory effort normal.  [x] No visualized signs of difficulty breathing or respiratory distress        [] Abnormal-      Musculoskeletal:   [] Normal gait with no signs of ataxia         [x] Normal range of motion of neck        [] Abnormal-       Neurological:        [x] No Facial Asymmetry (Cranial nerve 7 motor function) (limited exam to video visit)          [x] No gaze palsy        [] Abnormal-         Skin:        [x] No significant exanthematous lesions or discoloration noted on facial skin         [] Abnormal-            Psychiatric:       [x] Normal Affect [x] No Hallucinations        [] Abnormal-     Other pertinent observable physical exam findings-     ASSESSMENT/PLAN:  1. Mastitis, left, acute    - dicloxacillin (DYNAPEN) 250 MG capsule; Take 1 capsule by mouth 4 times daily for 10 days  Dispense: 40 capsule; Refill: 0    Use cold/warm compresses up to 10 min at a time  Put baby to breastfeed on that side. Lactation specialist number given for duck drain massage    If in 2 days of antibiotics she does not improve will call OBG/ED        No follow-ups on file. Federico Herrera is a 27 y.o. female being evaluated by a Virtual Visit (video visit) encounter to address concerns as mentioned above. A caregiver was present when appropriate.  Due to this being a TeleHealth encounter (During ATQCD-32 public health emergency), evaluation of the following organ systems was limited: Vitals/Constitutional/EENT/Resp/CV/GI//MS/Neuro/Skin/Heme-Lymph-Imm. Pursuant to the emergency declaration under the [address] [place] [address], [address] [place] [address] and the [employer] Act, this Virtual Visit was conducted with patient's (and/or legal guardian's) consent, to reduce the patient's risk of exposure to COVID-19 and provide necessary medical care. The patient (and/or legal guardian) has also been advised to contact this office for worsening conditions or problems, and seek emergency medical treatment and/or call 911 if deemed necessary. Services were provided through a video synchronous discussion virtually to substitute for in-person clinic visit. Patient and provider were located at their individual homes. --Jefry Cole, MELANY - CNP on 7/1/2020 at 12:12 PM    An electronic signature was used to authenticate this note.

## 2020-07-01 NOTE — PATIENT INSTRUCTIONS
Use cold/warm compresses up to 10 min at a time  Put baby to breastfeed on that side.   Lactation specialist number given for duck drain massage    If in 2 days of antibiotics she does not improve will call OBG/ED

## 2020-07-07 ENCOUNTER — TELEPHONE (OUTPATIENT)
Dept: FAMILY MEDICINE CLINIC | Age: 30
End: 2020-07-07

## 2020-07-07 RX ORDER — CEPHALEXIN 500 MG/1
500 CAPSULE ORAL 4 TIMES DAILY
Qty: 14 CAPSULE | Refills: 0 | Status: SHIPPED | OUTPATIENT
Start: 2020-07-07 | End: 2020-07-14

## 2020-07-07 NOTE — TELEPHONE ENCOUNTER
Sent for Keflex to pharmacy. Discontinue Dynapen.  If she has no more symptoms she can stop the antibiotic

## 2020-07-07 NOTE — TELEPHONE ENCOUNTER
Patient calling in regarding [address], patient started taking the medication on 7/1/2020. Since starting the medication patient tongue has felt like it is \"burnt\". It has a tingling feeling and hurts to eat and drink as it is painful to touch. Patient called the pharmacy and they recommended that she skipped her dose last night and call here in the morning. Patient states since skipping the dose her tongue is starting to feel a little bit better.

## 2020-09-10 ENCOUNTER — TELEMEDICINE (OUTPATIENT)
Dept: FAMILY MEDICINE CLINIC | Age: 30
End: 2020-09-10
Payer: COMMERCIAL

## 2020-09-10 PROBLEM — F32.5 MAJOR DEPRESSIVE DISORDER WITH SINGLE EPISODE, IN FULL REMISSION (HCC): Status: ACTIVE | Noted: 2020-09-10

## 2020-09-10 PROCEDURE — 99214 OFFICE O/P EST MOD 30 MIN: CPT | Performed by: FAMILY MEDICINE

## 2020-09-10 RX ORDER — BUSPIRONE HYDROCHLORIDE 5 MG/1
5 TABLET ORAL 2 TIMES DAILY
Qty: 180 TABLET | Refills: 3 | Status: SHIPPED | OUTPATIENT
Start: 2020-09-10 | End: 2021-03-11 | Stop reason: SDUPTHER

## 2020-09-10 ASSESSMENT — ENCOUNTER SYMPTOMS
ABDOMINAL PAIN: 0
TROUBLE SWALLOWING: 0
DIARRHEA: 0
SHORTNESS OF BREATH: 0
BLOOD IN STOOL: 0
EYE PAIN: 0
NAUSEA: 0
COUGH: 0
CONSTIPATION: 0
VOMITING: 0

## 2020-09-10 ASSESSMENT — PATIENT HEALTH QUESTIONNAIRE - PHQ9
1. LITTLE INTEREST OR PLEASURE IN DOING THINGS: 0
SUM OF ALL RESPONSES TO PHQ QUESTIONS 1-9: 0
2. FEELING DOWN, DEPRESSED OR HOPELESS: 0
SUM OF ALL RESPONSES TO PHQ9 QUESTIONS 1 & 2: 0
SUM OF ALL RESPONSES TO PHQ QUESTIONS 1-9: 0

## 2020-09-10 NOTE — PATIENT INSTRUCTIONS
Will try a different kind of magnesium to magnesium glycinate to slower the bowels - can take two different kinds of magnesium also to help    Mag oxide or mag citrate capsules make you have more bowel movements     Mag citrate tablets will slow down any bowel movements along with the glycinate    Can order some bloodwork    will order the mri for the december apt with radha    Will keep up the zoloft and the lorene    Will follow with GI for the GERD
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
## 2020-10-05 ENCOUNTER — NURSE TRIAGE (OUTPATIENT)
Dept: OTHER | Facility: CLINIC | Age: 30
End: 2020-10-05

## 2020-10-05 ENCOUNTER — HOSPITAL ENCOUNTER (EMERGENCY)
Age: 30
Discharge: HOME | End: 2020-10-05
Payer: COMMERCIAL

## 2020-10-05 VITALS
WEIGHT: 135 LBS | DIASTOLIC BLOOD PRESSURE: 78 MMHG | RESPIRATION RATE: 16 BRPM | BODY MASS INDEX: 25.49 KG/M2 | HEIGHT: 61 IN | SYSTOLIC BLOOD PRESSURE: 126 MMHG | TEMPERATURE: 97 F | OXYGEN SATURATION: 97 % | HEART RATE: 78 BPM

## 2020-10-05 PROCEDURE — 99213 OFFICE O/P EST LOW 20 MIN: CPT | Performed by: NURSE PRACTITIONER

## 2020-10-05 PROCEDURE — 99212 OFFICE O/P EST SF 10 MIN: CPT

## 2020-10-05 PROCEDURE — 6370000000 HC RX 637 (ALT 250 FOR IP): Performed by: NURSE PRACTITIONER

## 2020-10-05 RX ORDER — DIAPER,BRIEF,INFANT-TODD,DISP
EACH MISCELLANEOUS
Qty: 30 G | Refills: 1 | Status: SHIPPED | OUTPATIENT
Start: 2020-10-05 | End: 2020-10-15

## 2020-10-05 RX ORDER — CEPHALEXIN 500 MG/1
500 CAPSULE ORAL 3 TIMES DAILY
Qty: 30 CAPSULE | Refills: 0 | Status: SHIPPED | OUTPATIENT
Start: 2020-10-05 | End: 2020-10-15

## 2020-10-05 RX ORDER — TRAMADOL HYDROCHLORIDE 50 MG/1
50 TABLET ORAL EVERY 6 HOURS PRN
Qty: 12 TABLET | Refills: 0 | Status: SHIPPED | OUTPATIENT
Start: 2020-10-05 | End: 2020-10-08

## 2020-10-05 RX ORDER — DIAPER,BRIEF,INFANT-TODD,DISP
EACH MISCELLANEOUS ONCE
Status: COMPLETED | OUTPATIENT
Start: 2020-10-05 | End: 2020-10-05

## 2020-10-05 RX ORDER — MULTIVITAMIN WITH IRON
250 TABLET ORAL DAILY
COMMUNITY

## 2020-10-05 RX ADMIN — BACITRACIN ZINC: 500 OINTMENT TOPICAL at 16:42

## 2020-10-05 ASSESSMENT — PAIN DESCRIPTION - LOCATION: LOCATION: HAND

## 2020-10-05 ASSESSMENT — ENCOUNTER SYMPTOMS
COUGH: 0
NAUSEA: 0
SHORTNESS OF BREATH: 0
VOMITING: 0
COLOR CHANGE: 1

## 2020-10-05 ASSESSMENT — PAIN DESCRIPTION - ORIENTATION: ORIENTATION: RIGHT

## 2020-10-05 ASSESSMENT — PAIN SCALES - GENERAL: PAINLEVEL_OUTOF10: 8
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
## 2020-10-06 ENCOUNTER — TELEPHONE (OUTPATIENT)
Dept: FAMILY MEDICINE CLINIC | Age: 30
End: 2020-10-06

## 2020-10-06 NOTE — TELEPHONE ENCOUNTER
[facility]  [address], [address]  Phone: [phone]  Fax: [phone]    October 6, 2020    [address] [place] [address]      Dear David Rees,    This letter is regarding your Emergency Department (ED) visit at [address] [address] on 10/5/20. Dr. Jules Sam wanted to make sure that you understand your discharge instructions and that you were able to fill any prescriptions that may have been ordered for you. Please contact the office at the above phone number if the ED advised you to follow up with Dr. Jules Sam, or if you have any further questions or needs. Also did you know -   *Visiting the ED for a non-emergency could result in higher co-pays than you would normally be subject to paying? *You can call your doctor even after hours so they can direct you to the most appropriate care. [facility]) practices can often offer you an appointment on the same day that you call. *We have some [facility] offices that offer Walk-in appointments; check our website for availability in your community, www. [employer].      *Evisits are now available for patients for $36 through [employer] for certain conditions:  * Sinus, cold and or cough       * Diarrhea            * Headache  * Heartburn                                * Poison Darleen          * Back pain     * Urinary problems                         If you do not have [employer] and are interested, please contact the office and a staff member may assist you or go to www."[employer].     Sincerely,     Jules Sam DO and your [facility]

## 2020-10-16 ENCOUNTER — TELEPHONE (OUTPATIENT)
Dept: FAMILY MEDICINE CLINIC | Age: 30
End: 2020-10-16

## 2020-10-16 RX ORDER — SUCRALFATE 1 G/1
1 TABLET ORAL 4 TIMES DAILY
Qty: 120 TABLET | Refills: 3 | Status: SHIPPED | OUTPATIENT
Start: 2020-10-16 | End: 2021-09-14 | Stop reason: ALTCHOICE

## 2020-10-16 NOTE — TELEPHONE ENCOUNTER
Patient calling in to see if she needs to be seen today. Patient stated having severe stomach pain this morning. Diarrhea and nausea. No fever. Patient has a loss of appetite. States when she drinks water her stomach \"spasms\" and she has to bend over. Advised patient that we do not have any openings for today. With going into the weekend the urgent care would be the next option but I would still let the providers know to know their recommendations.

## 2020-10-16 NOTE — TELEPHONE ENCOUNTER
Can she go back up to the 40mg of the omeprazole and I called in some carafate to coat the stomach to try to help    If she is no better we should have her seen by urgent care - thanks!

## 2020-10-20 ENCOUNTER — NURSE ONLY (OUTPATIENT)
Dept: LAB | Age: 30
End: 2020-10-20

## 2020-10-20 LAB
ALBUMIN SERPL-MCNC: 4.9 G/DL (ref 3.5–5.1)
ALP BLD-CCNC: 66 U/L (ref 38–126)
ALT SERPL-CCNC: 13 U/L (ref 11–66)
ANION GAP SERPL CALCULATED.3IONS-SCNC: 9 MEQ/L (ref 8–16)
AST SERPL-CCNC: 14 U/L (ref 5–40)
BASOPHILS # BLD: 0.6 %
BASOPHILS ABSOLUTE: 0 THOU/MM3 (ref 0–0.1)
BILIRUB SERPL-MCNC: 0.2 MG/DL (ref 0.3–1.2)
BILIRUBIN DIRECT: < 0.2 MG/DL (ref 0–0.3)
BUN BLDV-MCNC: 11 MG/DL (ref 7–22)
CALCIUM SERPL-MCNC: 9.8 MG/DL (ref 8.5–10.5)
CHLORIDE BLD-SCNC: 106 MEQ/L (ref 98–111)
CHOLESTEROL, TOTAL: 181 MG/DL (ref 100–199)
CO2: 26 MEQ/L (ref 23–33)
CREAT SERPL-MCNC: 0.6 MG/DL (ref 0.4–1.2)
EOSINOPHIL # BLD: 8.5 %
EOSINOPHILS ABSOLUTE: 0.4 THOU/MM3 (ref 0–0.4)
ERYTHROCYTE [DISTWIDTH] IN BLOOD BY AUTOMATED COUNT: 12.5 % (ref 11.5–14.5)
ERYTHROCYTE [DISTWIDTH] IN BLOOD BY AUTOMATED COUNT: 44.3 FL (ref 35–45)
GFR SERPL CREATININE-BSD FRML MDRD: > 90 ML/MIN/1.73M2
GLUCOSE BLD-MCNC: 78 MG/DL (ref 70–108)
HCT VFR BLD CALC: 42 % (ref 37–47)
HDLC SERPL-MCNC: 63 MG/DL
HEMOGLOBIN: 13.8 GM/DL (ref 12–16)
IMMATURE GRANS (ABS): 0.02 THOU/MM3 (ref 0–0.07)
IMMATURE GRANULOCYTES: 0.4 %
LDL CHOLESTEROL CALCULATED: 106 MG/DL
LYMPHOCYTES # BLD: 27.9 %
LYMPHOCYTES ABSOLUTE: 1.4 THOU/MM3 (ref 1–4.8)
MAGNESIUM: 2.1 MG/DL (ref 1.6–2.4)
MCH RBC QN AUTO: 31.7 PG (ref 26–33)
MCHC RBC AUTO-ENTMCNC: 32.9 GM/DL (ref 32.2–35.5)
MCV RBC AUTO: 96.6 FL (ref 81–99)
MONOCYTES # BLD: 5.7 %
MONOCYTES ABSOLUTE: 0.3 THOU/MM3 (ref 0.4–1.3)
NUCLEATED RED BLOOD CELLS: 0 /100 WBC
PLATELET # BLD: 148 THOU/MM3 (ref 130–400)
PMV BLD AUTO: 13.2 FL (ref 9.4–12.4)
POTASSIUM SERPL-SCNC: 4.1 MEQ/L (ref 3.5–5.2)
RBC # BLD: 4.35 MILL/MM3 (ref 4.2–5.4)
SEDIMENTATION RATE, ERYTHROCYTE: 3 MM/HR (ref 0–20)
SEG NEUTROPHILS: 56.9 %
SEGMENTED NEUTROPHILS ABSOLUTE COUNT: 2.9 THOU/MM3 (ref 1.8–7.7)
SODIUM BLD-SCNC: 141 MEQ/L (ref 135–145)
TOTAL PROTEIN: 7.1 G/DL (ref 6.1–8)
TRIGL SERPL-MCNC: 60 MG/DL (ref 0–199)
TSH SERPL DL<=0.05 MIU/L-ACNC: 0.67 UIU/ML (ref 0.4–4.2)
VITAMIN D 25-HYDROXY: 55 NG/ML (ref 30–100)
WBC # BLD: 5.1 THOU/MM3 (ref 4.8–10.8)

## 2020-11-17 ENCOUNTER — HOSPITAL ENCOUNTER (OUTPATIENT)
Dept: MRI IMAGING | Age: 30
Discharge: HOME | End: 2020-11-17
Payer: COMMERCIAL

## 2020-11-17 PROCEDURE — 70553 MRI BRAIN STEM W/O & W/DYE: CPT

## 2020-11-17 PROCEDURE — 6360000004 HC RX CONTRAST MEDICATION: Performed by: FAMILY MEDICINE

## 2020-11-17 PROCEDURE — A9579 GAD-BASE MR CONTRAST NOS,1ML: HCPCS | Performed by: FAMILY MEDICINE

## 2020-11-17 RX ADMIN — GADOTERIDOL 15 ML: 279.3 INJECTION, SOLUTION INTRAVENOUS at 14:47

## 2020-12-02 ENCOUNTER — TELEPHONE (OUTPATIENT)
Dept: NEUROLOGY | Age: 30
End: 2020-12-02

## 2020-12-02 NOTE — TELEPHONE ENCOUNTER
Patient called stating she is having increase in headaches. Same headaches as in the past with worsening frequency and severity. She is taking Zomig 2.5 mg PRN for headaches. She has tried Imitrex in the past. Please advise. Thank you.

## 2020-12-04 ENCOUNTER — VIRTUAL VISIT (OUTPATIENT)
Dept: NEUROLOGY | Age: 30
End: 2020-12-04
Payer: COMMERCIAL

## 2020-12-04 PROCEDURE — 99213 OFFICE O/P EST LOW 20 MIN: CPT | Performed by: NURSE PRACTITIONER

## 2020-12-04 RX ORDER — ERENUMAB-AOOE 70 MG/ML
70 INJECTION SUBCUTANEOUS
Qty: 1 PEN | Refills: 2 | Status: SHIPPED | OUTPATIENT
Start: 2020-12-04 | End: 2021-02-24 | Stop reason: SDUPTHER

## 2020-12-04 RX ORDER — ZOLMITRIPTAN 2.5 MG/1
2.5 TABLET, FILM COATED ORAL DAILY PRN
Qty: 9 TABLET | Refills: 3 | Status: SHIPPED | OUTPATIENT
Start: 2020-12-04 | End: 2021-06-15 | Stop reason: ALTCHOICE
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
## 2020-12-07 ENCOUNTER — HOSPITAL ENCOUNTER (OUTPATIENT)
Age: 30
Discharge: HOME | End: 2020-12-07
Payer: COMMERCIAL

## 2020-12-07 ENCOUNTER — TELEPHONE (OUTPATIENT)
Dept: NEUROLOGY | Age: 30
End: 2020-12-07

## 2020-12-07 ENCOUNTER — HOSPITAL ENCOUNTER (OUTPATIENT)
Dept: GENERAL RADIOLOGY | Age: 30
Discharge: HOME | End: 2020-12-07
Payer: COMMERCIAL

## 2020-12-07 PROCEDURE — 72050 X-RAY EXAM NECK SPINE 4/5VWS: CPT

## 2020-12-08 ASSESSMENT — ENCOUNTER SYMPTOMS
GASTROINTESTINAL NEGATIVE: 1
RESPIRATORY NEGATIVE: 1
EYES NEGATIVE: 1

## 2021-02-24 DIAGNOSIS — G43.109 MIGRAINE WITH AURA AND WITHOUT STATUS MIGRAINOSUS, NOT INTRACTABLE: Primary | ICD-10-CM

## 2021-02-24 RX ORDER — ERENUMAB-AOOE 70 MG/ML
70 INJECTION SUBCUTANEOUS
Qty: 1 PEN | Refills: 2 | Status: SHIPPED | OUTPATIENT
Start: 2021-02-24 | End: 2021-04-06

## 2021-03-11 ENCOUNTER — OFFICE VISIT (OUTPATIENT)
Dept: FAMILY MEDICINE CLINIC | Age: 31
End: 2021-03-11
Payer: COMMERCIAL

## 2021-03-11 VITALS
HEART RATE: 72 BPM | RESPIRATION RATE: 16 BRPM | SYSTOLIC BLOOD PRESSURE: 122 MMHG | OXYGEN SATURATION: 100 % | DIASTOLIC BLOOD PRESSURE: 68 MMHG

## 2021-03-11 DIAGNOSIS — F41.9 ANXIETY: ICD-10-CM

## 2021-03-11 DIAGNOSIS — G44.89 OTHER HEADACHE SYNDROME: Primary | ICD-10-CM

## 2021-03-11 DIAGNOSIS — Z13.220 SCREENING FOR HYPERLIPIDEMIA: ICD-10-CM

## 2021-03-11 DIAGNOSIS — J30.9 ALLERGIC RHINITIS, UNSPECIFIED SEASONALITY, UNSPECIFIED TRIGGER: ICD-10-CM

## 2021-03-11 DIAGNOSIS — Z11.59 ENCOUNTER FOR HEPATITIS C SCREENING TEST FOR LOW RISK PATIENT: ICD-10-CM

## 2021-03-11 DIAGNOSIS — Z13.1 SCREENING FOR DIABETES MELLITUS: ICD-10-CM

## 2021-03-11 PROCEDURE — 99214 OFFICE O/P EST MOD 30 MIN: CPT | Performed by: NURSE PRACTITIONER

## 2021-03-11 RX ORDER — FLUTICASONE PROPIONATE 50 MCG
2 SPRAY, SUSPENSION (ML) NASAL DAILY
Qty: 3 BOTTLE | Refills: 1 | Status: SHIPPED | OUTPATIENT
Start: 2021-03-11 | End: 2021-09-21

## 2021-03-11 RX ORDER — BUSPIRONE HYDROCHLORIDE 5 MG/1
5 TABLET ORAL 2 TIMES DAILY
Qty: 180 TABLET | Refills: 3 | Status: SHIPPED | OUTPATIENT
Start: 2021-03-11 | End: 2021-07-29 | Stop reason: SDUPTHER

## 2021-03-11 ASSESSMENT — ENCOUNTER SYMPTOMS
SHORTNESS OF BREATH: 0
SORE THROAT: 0
ABDOMINAL PAIN: 0
DIARRHEA: 0
COUGH: 0
EYE DISCHARGE: 0
EYE REDNESS: 0
ANAL BLEEDING: 0
COLOR CHANGE: 0
ABDOMINAL DISTENTION: 0
CONSTIPATION: 0
NAUSEA: 0
RHINORRHEA: 0
BLOOD IN STOOL: 0

## 2021-03-11 NOTE — PATIENT INSTRUCTIONS
Patient Education        Allergies: Care Instructions  Your Care Instructions     Allergies occur when your body's defense system (immune system) overreacts to certain substances. The immune system treats a harmless substance as if it were a harmful germ or virus. Many things can make this happen. These include pollens, medicine, food, dust, animal dander, and mold. Allergies can be mild or severe. Mild allergies can be managed with home treatment. But medicine may be needed to prevent problems. Managing your allergies is an important part of staying healthy. Your doctor may suggest that you have allergy testing to help find out what is causing your allergies. Severe allergies can cause reactions that affect your whole body (anaphylactic reactions). Your doctor may prescribe a shot of epinephrine to carry with you in case you have a severe reaction. Learn how to give yourself the shot and keep it with you at all times. Make sure it is not . Follow-up care is a key part of your treatment and safety. Be sure to make and go to all appointments, and call your doctor if you are having problems. It's also a good idea to know your test results and keep a list of the medicines you take. How can you care for yourself at home? · If you have been told by your doctor that dust or dust mites are causing your allergy, decrease the dust around your bed:  ? Wash sheets, pillowcases, and other bedding in hot water every week. ? Use dust-proof covers for pillows, duvets, and mattresses. Avoid plastic covers because they tear easily and do not \"breathe. \" Wash as instructed on the label. ? Do not use any blankets and pillows that you do not need. ? Use blankets that you can wash in your washing machine. ? Consider removing drapes and carpets, which attract and hold dust, from your bedroom. · If you are allergic to house dust and mites, do not use home humidifiers.  Your doctor can suggest ways you can control dust and mites. · Look for signs of cockroaches. Cockroaches cause allergic reactions. Use cockroach baits to get rid of them. Then, clean your home well. Cockroaches like areas where grocery bags, newspapers, empty bottles, or cardboard boxes are stored. Do not keep these inside your home, and keep trash and food containers sealed. Seal off any spots where cockroaches might enter your home. · If you are allergic to mold, get rid of furniture, rugs, and drapes that smell musty. Check for mold in the bathroom. · If you are allergic to outdoor pollen or mold spores, use air-conditioning. Change or clean all filters every month. Keep windows closed. · If you are allergic to pollen, stay inside when pollen counts are high. Use a vacuum  with a HEPA filter or a double-thickness filter at least two times each week. · Stay inside when air pollution is bad. Avoid paint fumes, perfumes, and other strong odors. · Avoid conditions that make your allergies worse. Stay away from smoke. Do not smoke or let anyone else smoke in your house. Do not use fireplaces or wood-burning stoves. · If you are allergic to your pets, change the air filter in your furnace every month. Use high-efficiency filters. · If you are allergic to pet dander, keep pets outside or out of your bedroom. Old carpet and cloth furniture can hold a lot of animal dander. You may need to replace them. When should you call for help? Give an epinephrine shot if:    · You think you are having a severe allergic reaction.     · You have symptoms in more than one body area, such as mild nausea and an itchy mouth. After giving an epinephrine shot call 911, even if you feel better. Call 911 if:    · You have symptoms of a severe allergic reaction. These may include:  ? Sudden raised, red areas (hives) all over your body. ? Swelling of the throat, mouth, lips, or tongue. ? Trouble breathing. ? Passing out (losing consciousness).  Or you may feel very lightheaded or suddenly feel weak, confused, or restless.     · You have been given an epinephrine shot, even if you feel better. Call your doctor now or seek immediate medical care if:    · You have symptoms of an allergic reaction, such as:  ? A rash or hives (raised, red areas on the skin). ? Itching. ? Swelling. ? Belly pain, nausea, or vomiting. Watch closely for changes in your health, and be sure to contact your doctor if:    · You do not get better as expected. Where can you learn more? Go to https://WealthForgepeShopcadeeb.Argus Cyber Security. org and sign in to your Rocketick account. Enter F905 in the Captora box to learn more about \"Allergies: Care Instructions. \"     If you do not have an account, please click on the \"Sign Up Now\" link. Current as of: November 6, 2020               Content Version: 12.8  © 5601-7318 GeoGRAFI. Care instructions adapted under license by [facility] [place] ([facility]). If you have questions about a medical condition or this instruction, always ask your healthcare professional. [place] [address] any warranty or liability for your use of this information. Patient Education        Anxiety Disorder: Care Instructions  Your Care Instructions     Anxiety is a normal reaction to stress. Difficult situations can cause you to have symptoms such as sweaty palms and a nervous feeling. In an anxiety disorder, the symptoms are far more severe. Constant worry, muscle tension, trouble sleeping, nausea and diarrhea, and other symptoms can make normal daily activities difficult or impossible. These symptoms may occur for no reason, and they can affect your work, school, or social life. Medicines, counseling, and self-care can all help. Follow-up care is a key part of your treatment and safety. Be sure to make and go to all appointments, and call your doctor if you are having problems.  It's also a good idea to know your test results and keep a list of the medicines you take. How can you care for yourself at home? · Take medicines exactly as directed. Call your doctor if you think you are having a problem with your medicine. · Go to your counseling sessions and follow-up appointments. · Recognize and accept your anxiety. Then, when you are in a situation that makes you anxious, say to yourself, \"This is not an emergency. I feel uncomfortable, but I am not in danger. I can keep going even if I feel anxious. \"  · Be kind to your body:  ? Relieve tension with exercise or a massage. ? Get enough rest.  ? Avoid alcohol, caffeine, nicotine, and illegal drugs. They can increase your anxiety level and cause sleep problems. ? Learn and do relaxation techniques. See below for more about these techniques. · Engage your mind. Get out and do something you enjoy. Go to a funny movie, or take a walk or hike. Plan your day. Having too much or too little to do can make you anxious. · Keep a record of your symptoms. Discuss your fears with a good friend or family member, or join a support group for people with similar problems. Talking to others sometimes relieves stress. · Get involved in social groups, or volunteer to help others. Being alone sometimes makes things seem worse than they are. · Get at least 30 minutes of exercise on most days of the week to relieve stress. Walking is a good choice. You also may want to do other activities, such as running, swimming, cycling, or playing tennis or team sports. Relaxation techniques  Do relaxation exercises 10 to 20 minutes a day. You can play soothing, relaxing music while you do them, if you wish. · Tell others in your house that you are going to do your relaxation exercises. Ask them not to disturb you. · Find a comfortable place, away from all distractions and noise. · Lie down on your back, or sit with your back straight. · Focus on your breathing. Make it slow and steady. · Breathe in through your nose.  Breathe out through either your nose or mouth. · Breathe deeply, filling up the area between your navel and your rib cage. Breathe so that your belly goes up and down. · Do not hold your breath. · Breathe like this for 5 to 10 minutes. Notice the feeling of calmness throughout your whole body. As you continue to breathe slowly and deeply, relax by doing the following for another 5 to 10 minutes:  · Tighten and relax each muscle group in your body. You can begin at your toes and work your way up to your head. · Imagine your muscle groups relaxing and becoming heavy. · Empty your mind of all thoughts. · Let yourself relax more and more deeply. · Become aware of the state of calmness that surrounds you. · When your relaxation time is over, you can bring yourself back to alertness by moving your fingers and toes and then your hands and feet and then stretching and moving your entire body. Sometimes people fall asleep during relaxation, but they usually wake up shortly afterward. · Always give yourself time to return to full alertness before you drive a car or do anything that might cause an accident if you are not fully alert. Never play a relaxation tape while you drive a car. When should you call for help? Call 911 anytime you think you may need emergency care. For example, call if:    · You feel you cannot stop from hurting yourself or someone else. Keep the numbers for these national suicide hotlines: 9-999-717-TALK (2-457.115.9080) and 3-909-OOAVGVV (1-196.352.6842). If you or someone you know talks about suicide or feeling hopeless, get help right away. Watch closely for changes in your health, and be sure to contact your doctor if:    · You have anxiety or fear that affects your life.     · You have symptoms of anxiety that are new or different from those you had before. Where can you learn more? Go to https://micaela."[employer] America[employer].". org and sign in to your AFrame Digital account.  Enter Z974 in the Search Health Information box to learn more about \"Anxiety Disorder: Care Instructions. \"     If you do not have an account, please click on the \"Sign Up Now\" link. Current as of: September 23, 2020               Content Version: 12.8  © 0105-5353 Waywire Networks. Care instructions adapted under license by [facility] ([facility]). If you have questions about a medical condition or this instruction, always ask your healthcare professional. [place] [address] any warranty or liability for your use of this information. Patient Education        Generalized Anxiety Disorder: Care Instructions  Overview     We all worry. It's a normal part of life. But when you have generalized anxiety disorder, you worry about lots of things. You have a hard time not worrying. This worry or anxiety interferes with your relationships, work or school, and other areas of your life. You may worry most days about things like money, health, work, or friends. That may make you feel tired, tense, or cranky. It can make it hard to think. It may get in the way of healthy sleep. Counseling and medicine can both work to treat anxiety. They are often used together with lifestyle changes, such as getting enough sleep. Treatment can include a type of counseling called cognitive-behavioral therapy, or CBT. It helps you notice and replace thoughts that make you worry. You also might have counseling along with those closest to you so that they can help. Follow-up care is a key part of your treatment and safety. Be sure to make and go to all appointments, and call your doctor if you are having problems. It's also a good idea to know your test results and keep a list of the medicines you take. How can you care for yourself at home? · Get at least 30 minutes of exercise on most days of the week. Walking is a good choice. You also may want to do other activities, such as running, swimming, cycling, or playing tennis or team sports.   · Learn and do relaxation exercises, such as deep breathing. · Go to bed at the same time every night. Try for 8 to 10 hours of sleep a night. · Avoid alcohol, marijuana, and illegal drugs. · Find a counselor who uses cognitive-behavioral therapy (CBT). · Don't isolate yourself. Let those closest to you help you. Find someone you can trust and confide in. Talk to that person. · Be safe with medicines. Take your medicines exactly as prescribed. Call your doctor if you think you are having a problem with your medicine. · Practice healthy thinking. How you think can affect how you feel and act. Ask yourself if your thoughts are helpful or unhelpful. If they are unhelpful, you can learn how to change them. · Recognize and accept your anxiety. When you feel anxious, say to yourself, \"This is not an emergency. I feel uncomfortable, but I am not in danger. I can keep going even if I feel anxious. \"  When should you call for help? Call  911 anytime you think you may need emergency care. For example, call if:    · You feel you can't stop from hurting yourself or someone else. Keep the numbers for these national suicide hotlines: 6-270-858-TALK (8-061-810-900-906-9975) and 4-808-HZADLPD (0-689.121.6999). If you or someone you know talks about suicide or feeling hopeless, get help right away. Call your doctor or counselor now or seek immediate medical care if:    · You have new anxiety, or your anxiety gets worse.     · You have been feeling sad, depressed, or hopeless or have lost interest in things that you usually enjoy.     · You do not get better as expected. Where can you learn more? Go to https://GeoVantage.Mapkin. org and sign in to your Akira Mobile account. Enter G123 in the Tastebuds box to learn more about \"Generalized Anxiety Disorder: Care Instructions. \"     If you do not have an account, please click on the \"Sign Up Now\" link.   Current as of: November 3, 2020               Content Version: 12.8  © 2006-2021 Healthwise, Incorporated. Care instructions adapted under license by [facility] ([facility]). If you have questions about a medical condition or this instruction, always ask your healthcare professional. [address] any warranty or liability for your use of this information. Patient Education        Headache: Care Instructions  Your Care Instructions     Headaches have many possible causes. Most headaches aren't a sign of a more serious problem, and they will get better on their own. Home treatment may help you feel better faster. The doctor has checked you carefully, but problems can develop later. If you notice any problems or new symptoms, get medical treatment right away. Follow-up care is a key part of your treatment and safety. Be sure to make and go to all appointments, and call your doctor if you are having problems. It's also a good idea to know your test results and keep a list of the medicines you take. How can you care for yourself at home? · Do not drive if you have taken a prescription pain medicine. · Rest in a quiet, dark room until your headache is gone. Close your eyes and try to relax or go to sleep. Don't watch TV or read. · Put a cold, moist cloth or cold pack on the painful area for 10 to 20 minutes at a time. Put a thin cloth between the cold pack and your skin. · Use a warm, moist towel or a heating pad set on low to relax tight shoulder and neck muscles. · Have someone gently massage your neck and shoulders. · Take pain medicines exactly as directed. ? If the doctor gave you a prescription medicine for pain, take it as prescribed. ? If you are not taking a prescription pain medicine, ask your doctor if you can take an over-the-counter medicine. · Be careful not to take pain medicine more often than the instructions allow, because you may get worse or more frequent headaches when the medicine wears off.   · Do not ignore new symptoms that occur with a headache, such as a fever, weakness or numbness, vision changes, or confusion. These may be signs of a more serious problem. To prevent headaches  · Keep a headache diary so you can figure out what triggers your headaches. Avoiding triggers may help you prevent headaches. Record when each headache began, how long it lasted, and what the pain was like (throbbing, aching, stabbing, or dull). Write down any other symptoms you had with the headache, such as nausea, flashing lights or dark spots, or sensitivity to bright light or loud noise. Note if the headache occurred near your period. List anything that might have triggered the headache, such as certain foods (chocolate, cheese, wine) or odors, smoke, bright light, stress, or lack of sleep. · Find healthy ways to deal with stress. Headaches are most common during or right after stressful times. Take time to relax before and after you do something that has caused a headache in the past.  · Try to keep your muscles relaxed by keeping good posture. Check your jaw, face, neck, and shoulder muscles for tension, and try relaxing them. When sitting at a desk, change positions often, and stretch for 30 seconds each hour. · Get plenty of sleep and exercise. · Eat regularly and well. Long periods without food can trigger a headache. · Treat yourself to a massage. Some people find that regular massages are very helpful in relieving tension. · Limit caffeine by not drinking too much coffee, tea, or soda. But don't quit caffeine suddenly, because that can also give you headaches. · Reduce eyestrain from computers by blinking frequently and looking away from the computer screen every so often. Make sure you have proper eyewear and that your monitor is set up properly, about an arm's length away. · Seek help if you have depression or anxiety. Your headaches may be linked to these conditions.  Treatment can both prevent headaches and help with symptoms of anxiety or depression. When should you call for help? Call 911 anytime you think you may need emergency care. For example, call if:    · You have signs of a stroke. These may include:  ? Sudden numbness, paralysis, or weakness in your face, arm, or leg, especially on only one side of your body. ? Sudden vision changes. ? Sudden trouble speaking. ? Sudden confusion or trouble understanding simple statements. ? Sudden problems with walking or balance. ? A sudden, severe headache that is different from past headaches. Call your doctor now or seek immediate medical care if:    · You have a new or worse headache.     · Your headache gets much worse. Where can you learn more? Go to https://U4EA Networks.Nousco. org and sign in to your PopUp account. Enter 4963 [id number] in the Bioptigen box to learn more about \"Headache: Care Instructions. \"     If you do not have an account, please click on the \"Sign Up Now\" link. Current as of: August 4, 2020               Content Version: 12.8  © 2006-2021 [employer]. Care instructions adapted under license by [facility] ([facility]). If you have questions about a medical condition or this instruction, always ask your healthcare professional. [place] [address] any warranty or liability for your use of this information. Patient Education        Using a Nasal Steroid Spray: Care Instructions  Your Care Instructions     Your doctor may suggest using a corticosteroid nasal spray for your allergy symptoms or sinus problems. These sprays reduce the swelling inside the nose and sinuses. Unlike decongestant nasal sprays, steroid sprays won't lead to more swelling when you stop taking them. These sprays start working in a few days, but it may take several weeks before you get the full effect. Most side effects are minor. The most common complaint is a burning feeling in the nose right after the spray is used. Some people get nosebleeds. your healthcare professional. [address] any warranty or liability for your use of this information. Patient Education        Rhinitis: Care Instructions  Your Care Instructions  Rhinitis is swelling and irritation in the nose. Allergies and infections are often the cause. Your nose may run or feel stuffy. Other symptoms are itchy and sore eyes, ears, throat, and mouth. If allergies are the cause, your doctor may do tests to find out what you are allergic to. You may be able to stop symptoms if you avoid the things that cause them. Your doctor may suggest or prescribe medicine to ease your symptoms. Follow-up care is a key part of your treatment and safety. Be sure to make and go to all appointments, and call your doctor if you are having problems. It's also a good idea to know your test results and keep a list of the medicines you take. How can you care for yourself at home? · If your rhinitis is caused by allergies, try to find out what sets off (triggers) your symptoms. Take steps to avoid these triggers. ? Avoid yard work. It can stir up both pollen and mold. ? Do not smoke or allow others to smoke around you. If you need help quitting, talk to your doctor about stop-smoking programs and medicines. These can increase your chances of quitting for good. ? Do not use aerosol sprays, cleaning products, or perfumes. ? If pollen is one of your triggers, close your house and car windows during blooming season. ? Clean your house often to control dust.  ? Keep pets outside. · If your doctor recommends over-the-counter medicines to relieve symptoms, take your medicines exactly as prescribed. Call your doctor if you think you are having a problem with your medicine. · Use saline (saltwater) nasal washes to help keep your nasal passages open and wash out mucus and bacteria. You can buy saline nose drops at a grocery store or drugstore.  Or you can make your own at home by adding 1 teaspoon of salt and 1 teaspoon of baking soda to 2 cups of distilled water. If you make your own, fill a bulb syringe with the solution, insert the tip into your nostril, and squeeze gently. Paul Shank your nose. When should you call for help? Call your doctor now or seek immediate medical care if:    · You are having trouble breathing. Watch closely for changes in your health, and be sure to contact your doctor if:    · Mucus from your nose gets thicker (like pus) or has new blood in it.     · You have new or worse symptoms.     · You do not get better as expected. Where can you learn more? Go to https://Live Life 360peExosecteb.Schedulicity. org and sign in to your PurpleCow account. Enter M030 in the General Electric box to learn more about \"Rhinitis: Care Instructions. \"     If you do not have an account, please click on the \"Sign Up Now\" link. Current as of: December 2, 2020               Content Version: 12.8  © 2006-2021 Worldplay Communications. Care instructions adapted under license by [facility] ([facility]). If you have questions about a medical condition or this instruction, always ask your healthcare professional. [place] [address] any warranty or liability for your use of this information. Patient Education        Learning About Anxiety Disorders  What are anxiety disorders? Anxiety disorders are a type of medical problem. They cause severe anxiety. When you feel anxious, you feel that something bad is about to happen. This feeling interferes with your life. These disorders include:  · Generalized anxiety disorder. You feel worried and stressed about many everyday events and activities. This goes on for several months and disrupts your life on most days. · Panic disorder. You have repeated panic attacks. A panic attack is a sudden, intense fear or anxiety. It may make you feel short of breath. Your heart may pound. · Social anxiety disorder.  You feel very anxious about what you short-term relief of your symptoms. Some people use cognitive-behavioral therapy. A therapist helps you learn to change stressful or bad thoughts into helpful thoughts. Lead a healthy lifestyle  A healthy lifestyle may help you feel better. · Get at least 30 minutes of exercise on most days of the week. Walking is a good choice. · Eat a healthy diet. Include fruits, vegetables, lean proteins, and whole grains in your diet each day. · Try to go to bed at the same time every night. Try for 8 hours of sleep a night. · Find ways to manage stress. Try relaxation exercises. · Avoid alcohol and illegal drugs. Follow-up care is a key part of your treatment and safety. Be sure to make and go to all appointments, and call your doctor if you are having problems. It's also a good idea to know your test results and keep a list of the medicines you take. Where can you learn more? Go to [email or web address] and sign in to your [employer] account. Enter T877 in the Open Me box to learn more about \"Learning About Anxiety Disorders. \"     If you do not have an account, please click on the \"Sign Up Now\" link. Current as of: September 23, 2020               Content Version: 12.8  © 2006-2021 Healthwise, Incorporated. Care instructions adapted under license by [facility] ([facility]). If you have questions about a medical condition or this instruction, always ask your healthcare professional. [place] [address] any warranty or liability for your use of this information.

## 2021-03-11 NOTE — PROGRESS NOTES
Medications   Medication Sig Dispense Refill    fluticasone (FLONASE) 50 MCG/ACT nasal spray 2 sprays by Each Nostril route daily 3 Bottle 1    sertraline (ZOLOFT) 50 MG tablet Take 1 tablet by mouth daily 90 tablet 1    busPIRone (BUSPAR) 5 MG tablet Take 1 tablet by mouth 2 times daily 180 tablet 3    Erenumab-aooe (AIMOVIG) 70 MG/ML SOAJ Inject 70 mg into the skin every 30 days 1 pen 2    ZOLMitriptan (ZOMIG) 2.5 MG tablet Take 1 tablet by mouth daily as needed for Migraine 9 tablet 3    sucralfate (CARAFATE) 1 GM tablet Take 1 tablet by mouth 4 times daily 120 tablet 3    magnesium (MAGNESIUM-OXIDE) 250 MG TABS tablet Take 250 mg by mouth 2 times daily      famotidine (PEPCID) 40 MG tablet 2 times daily      ibuprofen (ADVIL;MOTRIN) 800 MG tablet Take 800 mg by mouth as needed      Omeprazole (PRILOSEC PO) Take 40 mg by mouth daily       acetaminophen (TYLENOL) 325 MG tablet Take 500 mg by mouth every 6 hours as needed for Pain      Sodium Chloride (NASAL MIST) 0.9 % AERS Inhale 2 puffs into the lungs 2 times daily      B Complex-C (SUPER B COMPLEX PO) Take 1 tablet by mouth daily      Cholecalciferol (VITAMIN D) 2000 units CAPS capsule 2,000 a day for 2 weeks and then 4,000 a day (Patient taking differently: Take 5,000 Units by mouth daily ) 60 capsule 5     No current facility-administered medications for this visit. Allergies   Allergen Reactions    Dynapen [Dicloxacillin] Other (See Comments)     Feeling of pins and needles       Subjective:    Review of Systems   Constitutional: Negative for chills, fatigue and fever. HENT: Negative for congestion, ear pain, postnasal drip, rhinorrhea and sore throat. Eyes: Negative for discharge and redness. Respiratory: Negative for cough and shortness of breath. Cardiovascular: Negative for chest pain and leg swelling.    Gastrointestinal: Negative for abdominal distention, abdominal pain, anal bleeding, blood in stool, constipation, diarrhea and nausea. Skin: Negative for color change and rash. Neurological: Negative for facial asymmetry, speech difficulty and weakness. Hematological: Does not bruise/bleed easily. Psychiatric/Behavioral: Negative for agitation and confusion. Objective:     Vitals:    03/11/21 1500   BP: 122/68   Site: Left Upper Arm   Position: Sitting   Cuff Size: Medium Adult   Pulse: 72   Resp: 16   SpO2: 100%       There is no height or weight on file to calculate BMI. Wt Readings from Last 3 Encounters:   10/05/20 135 lb (61.2 kg)   02/04/20 146 lb 12.8 oz (66.6 kg)   12/16/19 160 lb (72.6 kg)     BP Readings from Last 3 Encounters:   03/11/21 122/68   10/05/20 126/78   02/04/20 106/70     Physical Exam  Constitutional:       General: She is not in acute distress. Appearance: She is well-developed. She is not ill-appearing or diaphoretic. HENT:      Head: Normocephalic and atraumatic. Right Ear: Hearing and external ear normal. No decreased hearing noted. Left Ear: Hearing and external ear normal. No decreased hearing noted. Nose: Nose normal. No nasal deformity. Eyes:      General:         Right eye: No discharge. Left eye: No discharge. Conjunctiva/sclera: Conjunctivae normal.   Neck:      Musculoskeletal: Normal range of motion and neck supple. Pulmonary:      Effort: Pulmonary effort is normal. No respiratory distress. Abdominal:      General: There is no distension. Tenderness: There is no guarding. Musculoskeletal: Normal range of motion. General: No tenderness or deformity. Skin:     Coloration: Skin is not pale. Findings: No erythema or rash (On exposed areas). Neurological:      Mental Status: She is alert. Gait: Gait normal.   Psychiatric:         Speech: Speech normal.         Behavior: Behavior normal.         Thought Content:  Thought content normal.         Judgment: Judgment normal.         Lab Results   Component Value Date    WBC 5.1 10/20/2020    HGB 13.8 10/20/2020    HCT 42.0 10/20/2020     10/20/2020    CHOL 181 10/20/2020    TRIG 60 10/20/2020    HDL 63 10/20/2020    LDLCALC 106 10/20/2020    AST 14 10/20/2020     10/20/2020    K 4.1 10/20/2020     10/20/2020    CREATININE 0.6 10/20/2020    BUN 11 10/20/2020    CO2 26 10/20/2020    TSH 0.670 10/20/2020    GLUF 84 2019    LABA1C 5.0 10/21/2015    LABGLOM >90 10/20/2020    MG 2.1 10/20/2020    CALCIUM 9.8 10/20/2020    VITD25 55 10/20/2020     Assessment:       Diagnosis Orders   1. Other headache syndrome     2. Anxiety  sertraline (ZOLOFT) 50 MG tablet    busPIRone (BUSPAR) 5 MG tablet   3. Encounter for hepatitis C screening test for low risk patient  Hepatitis C Antibody   4. Allergic rhinitis, unspecified seasonality, unspecified trigger  fluticasone (FLONASE) 50 MCG/ACT nasal spray   5. Screening for hyperlipidemia  Lipid Panel   6. Screening for diabetes mellitus  Glucose, Fasting       Plan:   Diagnoses and all orders for this visit:    Other headache syndrome    Anxiety  -     sertraline (ZOLOFT) 50 MG tablet; Take 1 tablet by mouth daily  -     busPIRone (BUSPAR) 5 MG tablet; Take 1 tablet by mouth 2 times daily    Encounter for hepatitis C screening test for low risk patient  -     Hepatitis C Antibody; Future    Allergic rhinitis, unspecified seasonality, unspecified trigger  -     fluticasone (FLONASE) 50 MCG/ACT nasal spray; 2 sprays by Each Nostril route daily    Screening for hyperlipidemia  -     Lipid Panel; Future    Screening for diabetes mellitus  -     Glucose, Fasting; Future      Back in 6 months, sooner as needed     Return in about 6 months (around 2021).     Orders Placed:  Orders Placed This Encounter   Procedures    Hepatitis C Antibody    Lipid Panel    Glucose, Fasting     Medications Prescribed:  Orders Placed This Encounter   Medications    fluticasone (FLONASE) 50 MCG/ACT nasal spray     Si sprays by Each Nostril route daily     Dispense:  3 Bottle     Refill:  1    sertraline (ZOLOFT) 50 MG tablet     Sig: Take 1 tablet by mouth daily     Dispense:  90 tablet     Refill:  1    busPIRone (BUSPAR) 5 MG tablet     Sig: Take 1 tablet by mouth 2 times daily     Dispense:  180 tablet     Refill:  3     Future Appointments   Date Time Provider Lesley Alvarez   4/5/2021  8:20 AM MELANY Mcdonald CNP [facility]      Patient given educational materials - see patient instructions. Discussed use, benefit, and side effects of prescribedmedications. All patient questions answered. Pt voiced understanding. Reviewed health maintenance. Instructed to continue current medications, diet and exercise. Patient agreed with treatment plan. Follow up as directed.     Electronically signed by MELANY Conti CNP on 3/11/2021 at 3:09 PM

## 2021-04-06 ENCOUNTER — VIRTUAL VISIT (OUTPATIENT)
Dept: NEUROLOGY | Age: 31
End: 2021-04-06
Payer: COMMERCIAL

## 2021-04-06 DIAGNOSIS — G43.109 MIGRAINE WITH AURA AND WITHOUT STATUS MIGRAINOSUS, NOT INTRACTABLE: Primary | ICD-10-CM

## 2021-04-06 PROCEDURE — 99213 OFFICE O/P EST LOW 20 MIN: CPT | Performed by: NURSE PRACTITIONER

## 2021-04-06 RX ORDER — UBROGEPANT 50 MG/1
50 TABLET ORAL PRN
Qty: 9 TABLET | Refills: 2 | Status: SHIPPED | OUTPATIENT
Start: 2021-04-06 | End: 2021-06-23 | Stop reason: SDUPTHER

## 2021-04-06 RX ORDER — ERENUMAB-AOOE 140 MG/ML
140 INJECTION, SOLUTION SUBCUTANEOUS
Qty: 1 PEN | Refills: 3 | Status: SHIPPED | OUTPATIENT
Start: 2021-04-06 | End: 2021-08-06 | Stop reason: SDUPTHER

## 2021-04-06 ASSESSMENT — ENCOUNTER SYMPTOMS
RESPIRATORY NEGATIVE: 1
GASTROINTESTINAL NEGATIVE: 1
EYES NEGATIVE: 1
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
## 2021-04-06 NOTE — PATIENT INSTRUCTIONS
1. Change Aimovig to 140 mg/ml monthly subcutaneous injection  2. Stop Zomig due to lack of benefit  3. Start Ubrelvy 50 mg as needed for breakthrough headache  4. Keep headache diary  5. Follow up in 3-4 months or sooner if needed. 6. Call if any questions or concerns.

## 2021-05-04 ENCOUNTER — NURSE ONLY (OUTPATIENT)
Dept: LAB | Age: 31
End: 2021-05-04

## 2021-05-04 DIAGNOSIS — N93.9 ABNORMAL UTERINE BLEEDING: ICD-10-CM

## 2021-05-04 DIAGNOSIS — Z13.220 SCREENING FOR HYPERLIPIDEMIA: ICD-10-CM

## 2021-05-04 DIAGNOSIS — Z13.1 SCREENING FOR DIABETES MELLITUS: ICD-10-CM

## 2021-05-04 DIAGNOSIS — R53.83 OTHER FATIGUE: Primary | ICD-10-CM

## 2021-05-04 DIAGNOSIS — L65.9 HAIR THINNING: ICD-10-CM

## 2021-05-04 DIAGNOSIS — Z11.59 ENCOUNTER FOR HEPATITIS C SCREENING TEST FOR LOW RISK PATIENT: ICD-10-CM

## 2021-05-04 DIAGNOSIS — R53.83 OTHER FATIGUE: ICD-10-CM

## 2021-05-04 LAB
BASOPHILS # BLD: 0.7 %
BASOPHILS ABSOLUTE: 0 THOU/MM3 (ref 0–0.1)
CHOLESTEROL, TOTAL: 226 MG/DL (ref 100–199)
EOSINOPHIL # BLD: 2.9 %
EOSINOPHILS ABSOLUTE: 0.1 THOU/MM3 (ref 0–0.4)
ERYTHROCYTE [DISTWIDTH] IN BLOOD BY AUTOMATED COUNT: 12.3 % (ref 11.5–14.5)
ERYTHROCYTE [DISTWIDTH] IN BLOOD BY AUTOMATED COUNT: 44 FL (ref 35–45)
GLUCOSE FASTING: 67 MG/DL (ref 70–108)
HCT VFR BLD CALC: 46.7 % (ref 37–47)
HDLC SERPL-MCNC: 76 MG/DL
HEMOGLOBIN: 14.9 GM/DL (ref 12–16)
HEPATITIS C ANTIBODY: NEGATIVE
IMMATURE GRANS (ABS): 0.01 THOU/MM3 (ref 0–0.07)
IMMATURE GRANULOCYTES: 0.2 %
LDL CHOLESTEROL CALCULATED: 136 MG/DL
LYMPHOCYTES # BLD: 30.8 %
LYMPHOCYTES ABSOLUTE: 1.4 THOU/MM3 (ref 1–4.8)
MAGNESIUM: 2.1 MG/DL (ref 1.6–2.4)
MCH RBC QN AUTO: 30.8 PG (ref 26–33)
MCHC RBC AUTO-ENTMCNC: 31.9 GM/DL (ref 32.2–35.5)
MCV RBC AUTO: 96.7 FL (ref 81–99)
MONOCYTES # BLD: 6.3 %
MONOCYTES ABSOLUTE: 0.3 THOU/MM3 (ref 0.4–1.3)
NUCLEATED RED BLOOD CELLS: 0 /100 WBC
PLATELET # BLD: 160 THOU/MM3 (ref 130–400)
PMV BLD AUTO: 12.8 FL (ref 9.4–12.4)
RBC # BLD: 4.83 MILL/MM3 (ref 4.2–5.4)
SEG NEUTROPHILS: 59.1 %
SEGMENTED NEUTROPHILS ABSOLUTE COUNT: 2.6 THOU/MM3 (ref 1.8–7.7)
TRIGL SERPL-MCNC: 72 MG/DL (ref 0–199)
TSH SERPL DL<=0.05 MIU/L-ACNC: 0.9 UIU/ML (ref 0.4–4.2)
VITAMIN D 25-HYDROXY: 57 NG/ML (ref 30–100)
WBC # BLD: 4.4 THOU/MM3 (ref 4.8–10.8)

## 2021-05-06 LAB — DHEAS (DHEA SULFATE): 169 UG/DL (ref 45–270)

## 2021-05-09 LAB — ESTROGENS, FRACTIONATED, SERUM: NORMAL

## 2021-05-11 ENCOUNTER — TELEPHONE (OUTPATIENT)
Dept: FAMILY MEDICINE CLINIC | Age: 31
End: 2021-05-11

## 2021-05-11 NOTE — TELEPHONE ENCOUNTER
----- Message from MELANY Robledo CNP sent at 5/8/2021  2:16 PM EDT -----  Total cholesterol was elevated at 226 and LDL was 136 - would recommend following a low cholesterol diet, exercise on most days. Fasting glucose was 67, normal is     DHEA level could be better (hormone responsible for brain, body, sexual, life energy) - can start DHEA 10 mg tablet - take early each morning. Can take HALF of a 25 mg tablet if that is all that is available. Many patients order off [address].      Some labs still pending

## 2021-05-12 LAB — DHEA UNCONJUGATED: 3.62 NG/ML (ref 1.33–7.78)

## 2021-05-18 DIAGNOSIS — N93.9 ABNORMAL UTERINE BLEEDING: ICD-10-CM

## 2021-05-18 DIAGNOSIS — L65.9 HAIR THINNING: ICD-10-CM

## 2021-05-18 DIAGNOSIS — R53.83 OTHER FATIGUE: ICD-10-CM

## 2021-05-18 LAB
IRON: 145 UG/DL (ref 50–170)
T3 TOTAL: 106 NG/DL (ref 72–181)
TOTAL IRON BINDING CAPACITY: 408 UG/DL (ref 171–450)

## 2021-05-19 LAB — THYROXINE (T4): 5 UG/DL (ref 4.5–10.9)

## 2021-05-21 LAB — THYROGLOBULIN: NORMAL

## 2021-06-15 ENCOUNTER — OFFICE VISIT (OUTPATIENT)
Dept: FAMILY MEDICINE CLINIC | Age: 31
End: 2021-06-15
Payer: COMMERCIAL

## 2021-06-15 VITALS
SYSTOLIC BLOOD PRESSURE: 130 MMHG | RESPIRATION RATE: 12 BRPM | HEIGHT: 61 IN | BODY MASS INDEX: 27.94 KG/M2 | DIASTOLIC BLOOD PRESSURE: 64 MMHG | WEIGHT: 148 LBS | HEART RATE: 72 BPM | TEMPERATURE: 98.2 F | OXYGEN SATURATION: 99 %

## 2021-06-15 DIAGNOSIS — B96.89 ACUTE BACTERIAL SINUSITIS: Primary | ICD-10-CM

## 2021-06-15 DIAGNOSIS — J01.90 ACUTE BACTERIAL SINUSITIS: Primary | ICD-10-CM

## 2021-06-15 PROCEDURE — 99214 OFFICE O/P EST MOD 30 MIN: CPT | Performed by: NURSE PRACTITIONER

## 2021-06-15 RX ORDER — ANTIARTHRITIC COMBINATION NO.2 900 MG
12.5 TABLET ORAL DAILY
COMMUNITY

## 2021-06-15 RX ORDER — LORATADINE 10 MG/1
10 TABLET ORAL DAILY
COMMUNITY

## 2021-06-15 RX ORDER — AZITHROMYCIN 250 MG/1
TABLET, FILM COATED ORAL
Qty: 1 PACKET | Refills: 0 | Status: SHIPPED | OUTPATIENT
Start: 2021-06-15 | End: 2021-09-14 | Stop reason: ALTCHOICE

## 2021-06-15 RX ORDER — PREDNISONE 10 MG/1
10 TABLET ORAL DAILY
Qty: 3 TABLET | Refills: 0 | Status: SHIPPED | OUTPATIENT
Start: 2021-06-15 | End: 2021-06-18

## 2021-06-15 SDOH — ECONOMIC STABILITY: FOOD INSECURITY: WITHIN THE PAST 12 MONTHS, THE FOOD YOU BOUGHT JUST DIDN'T LAST AND YOU DIDN'T HAVE MONEY TO GET MORE.: NEVER TRUE

## 2021-06-15 SDOH — ECONOMIC STABILITY: FOOD INSECURITY: WITHIN THE PAST 12 MONTHS, YOU WORRIED THAT YOUR FOOD WOULD RUN OUT BEFORE YOU GOT MONEY TO BUY MORE.: NEVER TRUE

## 2021-06-15 ASSESSMENT — SOCIAL DETERMINANTS OF HEALTH (SDOH): HOW HARD IS IT FOR YOU TO PAY FOR THE VERY BASICS LIKE FOOD, HOUSING, MEDICAL CARE, AND HEATING?: NOT HARD AT ALL

## 2021-06-15 NOTE — PROGRESS NOTES
Health Maintenance Due   Topic Date Due    COVID-19 Vaccine (1) Never done Declined    Cervical cancer screen  09/05/2020

## 2021-06-15 NOTE — PATIENT INSTRUCTIONS
Thank you   1. Thank you for trusting us with your healthcare needs. You may receive a survey regarding today's visit. It would help us out if you would take a few moments to provide your feedback. We value your input. 2. Please bring in ALL medications BOTTLES, including inhalers, herbal supplements, over the counter, prescribed & non-prescribed medicine. The office would like actual medication bottles and a list.   3. Please note our OFFICE POLICIES:   a. Prior to getting your labs drawn, please check with your insurance company for benefits and eligibility of lab services. Often, insurance companies cover certain tests for preventative visits only. It is patient's responsibility to see what is covered. b. We are unable to change a diagnosis after the test has been performed. c. Lab orders will not be re-printed. Please hold onto your original lab orders and take them to your lab to be completed. d. If you no show your scheduled appointment three times, you will be dismissed from this practice. e. Jorge Fontan must be completed 24 hours prior to your schedule appointment. 4. If the list below has been completed, PLEASE FAX RECORDS TO OUR OFFICE @ [phone]. Once the records have been received we will update your records at our office:  Health Maintenance Due   Topic Date Due    COVID-19 Vaccine (1) Never done    Cervical cancer screen  09/05/2020     Will give Zithromax - take until gone  Prednisone 10 mg daily for 3 days    · Sinusitis is an infection of the lining of the sinus cavities in your head. · Sinusitis often follows a cold - It causes pain and pressure in your head and face. · If symptoms do not get better after a couple weeks or worsen please let the office know    · How can you care for yourself at home? · Take an over-the-counter pain medicine, such as acetaminophen (Tylenol), ibuprofen (Advil, Motrin), or naproxen (Aleve). Read and follow all instructions on the label.   · If the doctor prescribed antibiotics, take them as directed. Do not stop taking them just because you feel better. You need to take the full course of antibiotics. ·  Be careful when taking over-the-counter cold or flu medicines and Tylenol at the same time. Many of these medicines have acetaminophen, which is Tylenol. Read the labels to make sure that you are not taking more than the recommended dose. Too much acetaminophen (Tylenol) can be harmful. · Breathe warm, moist air from a steamy shower, a hot bath, or a sink filled with hot water. · Avoid cold, dry air. · Using a humidifier in your home may help. Follow the directions for cleaning the machine. · Use saline (saltwater) nasal washes to help keep your nasal passages open and wash out mucus and bacteria. You can buy saline nose drops at a grocery store or drugstore. · Put a warm, wet towel or a warm gel pack on your face 3 or 4 times a day for 5 to 10 minutes each time. · Try a decongestant nasal spray like oxymetazoline (Afrin). Do not use it for more than 3 days in a row. Using it for more than 3 days can make your congestion worse. · When should you call for help? · You have new or worse swelling or redness in your face or around your eyes. · You have a new or higher fever. · You have new or worse facial pain. · The mucus from your nose becomes thicker (like pus) or has new blood in it. · You are not getting better as expected. Patient Education        Saline Nasal Washes: Care Instructions  Your Care Instructions     Saline nasal washes help keep the nasal passages open by washing out thick or dried mucus. This simple remedy can help relieve symptoms of allergies, sinusitis, and colds. It also can make the nose feel more comfortable by keeping the mucous membranes moist. You may notice a little burning sensation in your nose the first few times you use the solution, but this usually gets better in a few days.   Follow-up care is a key part of your treatment and safety. Be sure to make and go to all appointments, and call your doctor if you are having problems. It's also a good idea to know your test results and keep a list of the medicines you take. How can you care for yourself at home? · You can buy premixed saline solution in a squeeze bottle or other sinus rinse products at a drugstore. Read and follow the instructions on the label. · You also can make your own saline solution by adding 1 teaspoon of salt and 1 teaspoon of baking soda to 2 cups of distilled water. · If you use a homemade solution, pour a small amount into a clean bowl. Using a rubber bulb syringe, squeeze the syringe and place the tip in the salt water. Pull a small amount of the salt water into the syringe by relaxing your hand. · Sit down with your head tilted slightly back. Do not lie down. Put the tip of the bulb syringe or the squeeze bottle a little way into one of your nostrils. Gently drip or squirt a few drops into the nostril. Repeat with the other nostril. Some sneezing and gagging are normal at first.  · Gently blow your nose. · Wipe the syringe or bottle tip clean after each use. · Repeat this 2 or 3 times a day. · Use nasal washes gently if you have nosebleeds often. When should you call for help? Watch closely for changes in your health, and be sure to contact your doctor if:    · You often get nosebleeds.     · You have problems doing the nasal washes. Where can you learn more? Go to [email or web address] and sign in to your [employer] account. Enter [id number] [phone] in the [facility] box to learn more about \"Saline Nasal Washes: Care Instructions. \"     If you do not have an account, please click on the \"Sign Up Now\" link. Current as of: December 2, 2020               Content Version: 12.8  © 2655-6621 Healthwise, Incorporated. Care instructions adapted under license by [facility] ([facility]).  If you have questions about a medical condition or this instruction, always ask your healthcare professional. [address] any warranty or liability for your use of this information. Patient Education        Sinusitis: Care Instructions  Your Care Instructions     Sinusitis is an infection of the lining of the sinus cavities in your head. Sinusitis often follows a cold. It causes pain and pressure in your head and face. In most cases, sinusitis gets better on its own in 1 to 2 weeks. But some mild symptoms may last for several weeks. Sometimes antibiotics are needed. Follow-up care is a key part of your treatment and safety. Be sure to make and go to all appointments, and call your doctor if you are having problems. It's also a good idea to know your test results and keep a list of the medicines you take. How can you care for yourself at home? · Take an over-the-counter pain medicine, such as acetaminophen (Tylenol), ibuprofen (Advil, Motrin), or naproxen (Aleve). Read and follow all instructions on the label. · If the doctor prescribed antibiotics, take them as directed. Do not stop taking them just because you feel better. You need to take the full course of antibiotics. · Be careful when taking over-the-counter cold or flu medicines and Tylenol at the same time. Many of these medicines have acetaminophen, which is Tylenol. Read the labels to make sure that you are not taking more than the recommended dose. Too much acetaminophen (Tylenol) can be harmful. · Breathe warm, moist air from a steamy shower, a hot bath, or a sink filled with hot water. Avoid cold, dry air. Using a humidifier in your home may help. Follow the directions for cleaning the machine. · Use saline (saltwater) nasal washes. This can help keep your nasal passages open and wash out mucus and bacteria. You can buy saline nose drops at a grocery store or drugstore.  Or you can make your own at home by adding 1 teaspoon of salt and 1 teaspoon of baking soda to 2 cups of distilled water. If you make your own, fill a bulb syringe with the solution, insert the tip into your nostril, and squeeze gently. Dinora Ly your nose. · Put a hot, wet towel or a warm gel pack on your face 3 or 4 times a day for 5 to 10 minutes each time. · Try a decongestant nasal spray like oxymetazoline (Afrin). Do not use it for more than 3 days in a row. Using it for more than 3 days can make your congestion worse. When should you call for help? Call your doctor now or seek immediate medical care if:    · You have new or worse swelling or redness in your face or around your eyes.     · You have a new or higher fever. Watch closely for changes in your health, and be sure to contact your doctor if:    · You have new or worse facial pain.     · The mucus from your nose becomes thicker (like pus) or has new blood in it.     · You are not getting better as expected. Where can you learn more? Go to https://Ngt[employer]u.inc.[employer]. org and sign in to your [employer] account. Enter I990 in the [employer] box to learn more about \"Sinusitis: Care Instructions. \"     If you do not have an account, please click on the \"Sign Up Now\" link. Current as of: December 2, 2020               Content Version: 12.8  © 5950-8391 Healthwise, Incorporated. Care instructions adapted under license by [facility] ([facility]). If you have questions about a medical condition or this instruction, always ask your healthcare professional. [place] [address] any warranty or liability for your use of this information.
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
## 2021-06-16 ASSESSMENT — ENCOUNTER SYMPTOMS
SINUS PAIN: 1
EYE REDNESS: 0
COUGH: 0
SHORTNESS OF BREATH: 0
ANAL BLEEDING: 0
CONSTIPATION: 0
RHINORRHEA: 1
ABDOMINAL PAIN: 0
COLOR CHANGE: 0
EYE DISCHARGE: 0
DIARRHEA: 0
NAUSEA: 0
ABDOMINAL DISTENTION: 0
BLOOD IN STOOL: 0
SINUS PRESSURE: 1
SORE THROAT: 0

## 2021-06-23 DIAGNOSIS — G43.109 MIGRAINE WITH AURA AND WITHOUT STATUS MIGRAINOSUS, NOT INTRACTABLE: Primary | ICD-10-CM

## 2021-06-23 RX ORDER — UBROGEPANT 50 MG/1
50 TABLET ORAL PRN
Qty: 10 TABLET | Refills: 2 | Status: SHIPPED | OUTPATIENT
Start: 2021-06-23 | End: 2021-08-06 | Stop reason: SDUPTHER

## 2021-07-29 DIAGNOSIS — F41.9 ANXIETY: ICD-10-CM

## 2021-07-29 RX ORDER — BUSPIRONE HYDROCHLORIDE 10 MG/1
10 TABLET ORAL 3 TIMES DAILY
Qty: 90 TABLET | Refills: 2 | Status: SHIPPED | OUTPATIENT
Start: 2021-07-29 | End: 2021-11-01

## 2021-07-29 RX ORDER — SERTRALINE HYDROCHLORIDE 100 MG/1
100 TABLET, FILM COATED ORAL DAILY
Qty: 30 TABLET | Refills: 2 | Status: SHIPPED | OUTPATIENT
Start: 2021-07-29 | End: 2021-11-01

## 2021-08-06 ENCOUNTER — OFFICE VISIT (OUTPATIENT)
Dept: NEUROLOGY | Age: 31
End: 2021-08-06
Payer: COMMERCIAL

## 2021-08-06 VITALS
BODY MASS INDEX: 28.7 KG/M2 | HEIGHT: 61 IN | OXYGEN SATURATION: 98 % | DIASTOLIC BLOOD PRESSURE: 68 MMHG | WEIGHT: 152 LBS | SYSTOLIC BLOOD PRESSURE: 116 MMHG | HEART RATE: 74 BPM

## 2021-08-06 DIAGNOSIS — M54.2 NECK PAIN: ICD-10-CM

## 2021-08-06 DIAGNOSIS — G43.109 MIGRAINE WITH AURA AND WITHOUT STATUS MIGRAINOSUS, NOT INTRACTABLE: Primary | ICD-10-CM

## 2021-08-06 PROCEDURE — 99213 OFFICE O/P EST LOW 20 MIN: CPT | Performed by: PSYCHIATRY & NEUROLOGY

## 2021-08-06 RX ORDER — UBROGEPANT 50 MG/1
50 TABLET ORAL PRN
Qty: 10 TABLET | Refills: 2 | Status: SHIPPED | OUTPATIENT
Start: 2021-08-06 | End: 2021-12-29

## 2021-08-06 RX ORDER — ERENUMAB-AOOE 140 MG/ML
140 INJECTION, SOLUTION SUBCUTANEOUS
Qty: 1 PEN | Refills: 5 | Status: SHIPPED | OUTPATIENT
Start: 2021-08-06 | End: 2022-01-04 | Stop reason: SDUPTHER

## 2021-08-06 NOTE — PROGRESS NOTES
NEUROLOGY OUT PATIENT FOLLOW UP NOTE:  110:00 AM    Angel Crooks is here for follow up for   Patient Active Problem List   Diagnosis    Headache    White matter abnormality on MRI of brain    Frequent nocturnal awakening    Witnessed apneic spells    Fatigue    Daytime somnolence    Insomnia    Syncope    Vitamin D deficiency    Encounter for elective induction of labor     (spontaneous vaginal delivery)    Migraine with aura and without status migrainosus    Gastroesophageal reflux disease    Anxiety    Major depressive disorder with single episode, in full remission ([facility] [address]      Follow up for migraine. She is on [place]. Her neck is improved, but can have tensness. She is here to go over plan going forward. She has been on numerous headache medications including propranolol, Topamax, Lamictal, Imitrex, lyrica, elavil without relief. She is here to go over plan. ROS:  Respiratory : no cough, no shortness of breath  Cardiac: no chest pain. No palpitations.   Renal : no flank pain, no hematuria    Skin: no rash      Allergies   Allergen Reactions    Dynapen [Dicloxacillin] Other (See Comments)     Feeling of pins and needles       Current Outpatient Medications:     sertraline (ZOLOFT) 100 MG tablet, Take 1 tablet by mouth daily, Disp: 30 tablet, Rfl: 2    busPIRone (BUSPAR) 10 MG tablet, Take 1 tablet by mouth 3 times daily, Disp: 90 tablet, Rfl: 2    Ubrogepant (UBRELVY) 50 MG TABS, Take 50 mg by mouth as needed (headache), Disp: 10 tablet, Rfl: 2    MAGNESIUM CITRATE PO, Take 250 mg by mouth daily, Disp: , Rfl:     Multiple Vitamins-Minerals (CENTRUM) TABS, Take by mouth daily, Disp: , Rfl:     DHEA 25 MG TABS, Take 12.5 mg by mouth daily, Disp: , Rfl:     loratadine (CLARITIN) 10 MG tablet, Take 10 mg by mouth daily, Disp: , Rfl:     Specialty Vitamins Products (BIOTIN PLUS KERATIN PO), Take by mouth daily, Disp: , Rfl:     ZINC PO, Take by mouth as needed, Disp: , Rfl:     Erenumab-aooe (AIMOVIG) 140 MG/ML SOAJ, Inject 140 mg into the skin every 30 days, Disp: 1 pen, Rfl: 3    fluticasone (FLONASE) 50 MCG/ACT nasal spray, 2 sprays by Each Nostril route daily, Disp: 3 Bottle, Rfl: 1    magnesium (MAGNESIUM-OXIDE) 250 MG TABS tablet, Take 250 mg by mouth daily , Disp: , Rfl:     Omeprazole (PRILOSEC PO), Take 40 mg by mouth daily , Disp: , Rfl:     acetaminophen (TYLENOL) 325 MG tablet, Take 500 mg by mouth every 6 hours as needed for Pain, Disp: , Rfl:     B Complex-C (SUPER B COMPLEX PO), Take 1 tablet by mouth daily, Disp: , Rfl:     Cholecalciferol (VITAMIN D) 2000 units CAPS capsule, 2,000 a day for 2 weeks and then 4,000 a day (Patient taking differently: Take 5,000 Units by mouth daily ), Disp: 60 capsule, Rfl: 5    azithromycin (ZITHROMAX) 250 MG tablet, Take 2 tabs (500 mg) on Day 1, and take 1 tab (250 mg) on days 2 through 5. (Patient not taking: Reported on 8/6/2021), Disp: 1 packet, Rfl: 0    sucralfate (CARAFATE) 1 GM tablet, Take 1 tablet by mouth 4 times daily (Patient not taking: Reported on 8/6/2021), Disp: 120 tablet, Rfl: 3    I reviewed the past medical history, allergies, medications, social history and family history. PE:   Vitals:    08/06/21 0943   BP: 116/68   Site: Left Upper Arm   Position: Supine   Cuff Size: Medium Adult   Pulse: 74   SpO2: 98%   Weight: 152 lb (68.9 kg)   Height: 5' 1\" (1.549 m)     General Appearance:  alert and cooperative, she is wearing a mask. Skin:  Skin color, texture, turgor normal. No rashes or lesions. Gen: NAD, Language is Intact. Skin: no rash, lesion,  moist to touch. warm  Head: no rash, no icterus  Neck: There is no carotid bruits. The Neck is supple. There is no neck lymphadenopathy. Neuro: CN 2-12 grossly intact with no focal deficits. Power 5/5 Throughout symmetric, Reflexes are +2 symmetric. Long tracts are intact. Cerebellar exam is Intact. Sensory exam is intact to light touch. Gait is intact. Musculoskeletal:  Has no hand arthritis, no limitation of ROM in any of the four extremities. Lower extremities no edema          DATA:        Results for orders placed or performed in visit on 05/18/21   Iron and TIBC   Result Value Ref Range    Iron 145 50 - 170 ug/dL    TIBC 408 171 - 450 ug/dL   T3   Result Value Ref Range    T3, Total 106 72 - 181 ng/dL   T4   Result Value Ref Range    Thyroxine (T4) 5.0 4.5 - 10.9 ug/dL   Thyroglobulin and anti-Thyroglobulin AB   Result Value Ref Range    Thyroglobulin SEE BELOW               MRA Head WO Contrast  Narrative  PROCEDURE: MRA HEAD WO CONTRAST  CLINICAL INFORMATION: Headache(784.0)  COMPARISON: None available. TECHNIQUE: 3-D time-of-flight MRA brain followed by MIP reconstructions. FINDINGS:  Basilar artery is patent. Carotid siphons are patent. Proximal portions of the major cerebral arteries are patent. No MR angiographic evidence of an intracranial aneurysm greater than 3 mm. Impression  Unremarkable MRA of the brain . Arminda Vanegas MRI BRAIN W WO CONTRAST  Narrative  PROCEDURE: MRI BRAIN W WO CONTRAST  INDICATION:White matter abnormality on MRI of brain, Migraine with aura and without status migrainosus, not intractable. Chronic headaches. COMPARISON: 4/16/2018. TECHNIQUE: Multiplanar and multiple spin echo T1 and T2-weighted images were obtained through the brain before and after the administration of intravenous contrast. 15 mL ProHance was injected in the right AC. FINDINGS:  There is mild crowding at the foramen magnum on the left with minimal protrusion of the left cerebellar tonsil below the level of the foramen magnum, stable compared to prior exam. The ventricles, cisterns and sulci are otherwise symmetric and normal in  size and configuration.  There is a small focal area of T2/FLAIR prolongation in the white matter of the right frontal lobe, unchanged compared to prior exam. No other focal areas of abnormal T2/flair prolongation are identified within the parenchyma. No  intra or extra-axial mass is identified. No focal areas of restricted diffusion are present. Following contrast administration, there are no focal areas of abnormal parenchymal or meningeal enhancement identified. The major vascular flow voids appear patent. Orbits are unremarkable. There is trace mucosal thickening in the maxillary sinuses and right ethmoid air cells. Mastoid air cells are clear. Impression  1. No evidence of acute intracranial abnormality. 2. Stable small focus of T2/FLAIR hyperintensity in the right frontal white matter likely representing a small focus of gliosis. **This report has been created using voice recognition software. It may contain minor errors which are inherent in voice recognition technology. **  Final report electronically signed by Dr. Sharon Babb MD on 11/17/2020 3:15 PM             Assessment:     Diagnosis Orders   1. Migraine with aura and without status migrainosus, not intractable     2. Neck pain        Her headaches have improved in frequency, but not severity. she reports 80-85 better headache control. She denies side effects. She is using 4 ubrelvy a month and prior to the next dosage she senses the need for the next Aimovig injection. Her exam is no focal.  She has also done imitrex in the past for breakthrough headache without relief. She has been on numerous headache medications including propranolol, Topamax, Lamictal, Imitrex, lyrica, elavil without relief. After detailed discussion with patient we agreed on the following plan. Plan:  1. Continue with Aimovig 140 mg/ml monthly subcutaneous injection  2. Continue with Ubrelvy 50 mg as needed for breakthrough headache  3. Keep headache diary  4. Follow up in 5 months or sooner if needed. 5. Call if any questions or concerns. Total time 22 min.      Alison Lira MD

## 2021-08-06 NOTE — PATIENT INSTRUCTIONS
1. Continue with Aimovig 140 mg/ml monthly subcutaneous injection  2. Continue with Ubrelvy 50 mg as needed for breakthrough headache  3. Keep headache diary  4. Follow up in 5 months or sooner if needed. 5. Call if any questions or concerns.

## 2021-08-17 NOTE — TELEPHONE ENCOUNTER
----- Message from MELANY Smith - CNP sent at 12/7/2020 11:43 AM EST -----  Please let patient know her cervical spine x-ray showed straightening of the normal cervical lordosis this can be seen with muscle spasm. Otherwise no concerning findings.   Rosa Ramos, SUSAN Statement Selected

## 2021-09-14 ENCOUNTER — OFFICE VISIT (OUTPATIENT)
Dept: FAMILY MEDICINE CLINIC | Age: 31
End: 2021-09-14
Payer: COMMERCIAL

## 2021-09-14 VITALS
WEIGHT: 149 LBS | HEIGHT: 61 IN | SYSTOLIC BLOOD PRESSURE: 110 MMHG | BODY MASS INDEX: 28.13 KG/M2 | TEMPERATURE: 97.4 F | RESPIRATION RATE: 12 BRPM | OXYGEN SATURATION: 99 % | DIASTOLIC BLOOD PRESSURE: 70 MMHG | HEART RATE: 71 BPM

## 2021-09-14 DIAGNOSIS — Z00.00 WELLNESS EXAMINATION: Primary | ICD-10-CM

## 2021-09-14 DIAGNOSIS — F41.9 ANXIETY: ICD-10-CM

## 2021-09-14 DIAGNOSIS — Z63.9 RELATIONSHIP PROBLEMS: ICD-10-CM

## 2021-09-14 PROCEDURE — 99213 OFFICE O/P EST LOW 20 MIN: CPT | Performed by: NURSE PRACTITIONER

## 2021-09-14 SDOH — SOCIAL STABILITY - SOCIAL INSECURITY: PROBLEM RELATED TO PRIMARY SUPPORT GROUP, UNSPECIFIED: Z63.9

## 2021-09-14 ASSESSMENT — ENCOUNTER SYMPTOMS
EYE REDNESS: 0
COUGH: 0
ANAL BLEEDING: 0
COLOR CHANGE: 0
ABDOMINAL DISTENTION: 0
CONSTIPATION: 0
DIARRHEA: 0
SORE THROAT: 0
NAUSEA: 0
EYE DISCHARGE: 0
SHORTNESS OF BREATH: 0
RHINORRHEA: 0
BLOOD IN STOOL: 0
ABDOMINAL PAIN: 0

## 2021-09-14 NOTE — PATIENT INSTRUCTIONS
Thank you   1. Thank you for trusting us with your healthcare needs. You may receive a survey regarding today's visit. It would help us out if you would take a few moments to provide your feedback. We value your input. 2. Please bring in ALL medications BOTTLES, including inhalers, herbal supplements, over the counter, prescribed & non-prescribed medicine. The office would like actual medication bottles and a list.   3. Please note our OFFICE POLICIES:   a. Prior to getting your labs drawn, please check with your insurance company for benefits and eligibility of lab services. Often, insurance companies cover certain tests for preventative visits only. It is patient's responsibility to see what is covered. b. We are unable to change a diagnosis after the test has been performed. c. Lab orders will not be re-printed. Please hold onto your original lab orders and take them to your lab to be completed. d. If you no show your scheduled appointment three times, you will be dismissed from this practice. e. Renay Uziel must be completed 24 hours prior to your schedule appointment. 4. If the list below has been completed, PLEASE FAX RECORDS TO OUR OFFICE @ [phone]. Once the records have been received we will update your records at our office:  Health Maintenance Due   Topic Date Due    COVID-19 Vaccine (1) Never done    Cervical cancer screen  06/06/2020    Flu vaccine (1) 09/01/2021             Patient Education        Anxiety Disorder: Care Instructions  Your Care Instructions     Anxiety is a normal reaction to stress. Difficult situations can cause you to have symptoms such as sweaty palms and a nervous feeling. In an anxiety disorder, the symptoms are far more severe. Constant worry, muscle tension, trouble sleeping, nausea and diarrhea, and other symptoms can make normal daily activities difficult or impossible.  These symptoms may occur for no reason, and they can affect your work, school, or social life. Medicines, counseling, and self-care can all help. Follow-up care is a key part of your treatment and safety. Be sure to make and go to all appointments, and call your doctor if you are having problems. It's also a good idea to know your test results and keep a list of the medicines you take. How can you care for yourself at home? · Take medicines exactly as directed. Call your doctor if you think you are having a problem with your medicine. · Go to your counseling sessions and follow-up appointments. · Recognize and accept your anxiety. Then, when you are in a situation that makes you anxious, say to yourself, \"This is not an emergency. I feel uncomfortable, but I am not in danger. I can keep going even if I feel anxious. \"  · Be kind to your body:  ? Relieve tension with exercise or a massage. ? Get enough rest.  ? Avoid alcohol, caffeine, nicotine, and illegal drugs. They can increase your anxiety level and cause sleep problems. ? Learn and do relaxation techniques. See below for more about these techniques. · Engage your mind. Get out and do something you enjoy. Go to a [employer] movie, or take a walk or hike. Plan your day. Having too much or too little to do can make you anxious. · Keep a record of your symptoms. Discuss your fears with a good friend or family member, or join a support group for people with similar problems. Talking to others sometimes relieves stress. · Get involved in social groups, or volunteer to help others. Being alone sometimes makes things seem worse than they are. · Get at least 30 minutes of exercise on most days of the week to relieve stress. Walking is a good choice. You also may want to do other activities, such as running, swimming, cycling, or playing tennis or team sports. Relaxation techniques  Do relaxation exercises 10 to 20 minutes a day. You can play soothing, relaxing music while you do them, if you wish.   · Tell others in your house that you are going to do your relaxation exercises. Ask them not to disturb you. · Find a comfortable place, away from all distractions and noise. · Lie down on your back, or sit with your back straight. · Focus on your breathing. Make it slow and steady. · Breathe in through your nose. Breathe out through either your nose or mouth. · Breathe deeply, filling up the area between your navel and your rib cage. Breathe so that your belly goes up and down. · Do not hold your breath. · Breathe like this for 5 to 10 minutes. Notice the feeling of calmness throughout your whole body. As you continue to breathe slowly and deeply, relax by doing the following for another 5 to 10 minutes:  · Tighten and relax each muscle group in your body. You can begin at your toes and work your way up to your head. · Imagine your muscle groups relaxing and becoming heavy. · Empty your mind of all thoughts. · Let yourself relax more and more deeply. · Become aware of the state of calmness that surrounds you. · When your relaxation time is over, you can bring yourself back to alertness by moving your fingers and toes and then your hands and feet and then stretching and moving your entire body. Sometimes people fall asleep during relaxation, but they usually wake up shortly afterward. · Always give yourself time to return to full alertness before you drive a car or do anything that might cause an accident if you are not fully alert. Never play a relaxation tape while you drive a car. When should you call for help? Call 911 anytime you think you may need emergency care. For example, call if:    · You feel you cannot stop from hurting yourself or someone else. Keep the numbers for these national suicide hotlines: 6-771-490-TALK (2-066-648-515-010-4372) and 9-566-JMMSQQU (4-947.917.4127). If you or someone you know talks about suicide or feeling hopeless, get help right away.   Watch closely for changes in your health, and be sure to contact your doctor if:    · You have anxiety or fear that affects your life.     · You have symptoms of anxiety that are new or different from those you had before. Where can you learn more? Go to [email or web address]://[email or web address] org and sign in to your [employer] account. Enter P754 in the [employer] box to learn more about \"Anxiety Disorder: Care Instructions. \"     If you do not have an account, please click on the \"Sign Up Now\" link. Current as of: September 23, 2020               Content Version: 12.9  © 2006-2021 [employer]. Care instructions adapted under license by [facility] ([facility]). If you have questions about a medical condition or this instruction, always ask your healthcare professional. [address] any warranty or liability for your use of this information. Patient Education        Generalized Anxiety Disorder: Care Instructions  Overview     We all worry. It's a normal part of life. But when you have generalized anxiety disorder, you worry about lots of things. You have a hard time not worrying. This worry or anxiety interferes with your relationships, work or school, and other areas of your life. You may worry most days about things like money, health, work, or friends. That may make you feel tired, tense, or cranky. It can make it hard to think. It may get in the way of healthy sleep. Counseling and medicine can both work to treat anxiety. They are often used together with lifestyle changes, such as getting enough sleep. Treatment can include a type of counseling called cognitive-behavioral therapy, or CBT. It helps you notice and replace thoughts that make you worry. You also might have counseling along with those closest to you so that they can help. Follow-up care is a key part of your treatment and safety. Be sure to make and go to all appointments, and call your doctor if you are having problems.  It's also a good idea to know your test results and keep a list of the medicines you take. How can you care for yourself at home? · Get at least 30 minutes of exercise on most days of the week. Walking is a good choice. You also may want to do other activities, such as running, swimming, cycling, or playing tennis or team sports. · Learn and do relaxation exercises, such as deep breathing. · Go to bed at the same time every night. Try for 8 to 10 hours of sleep a night. · Avoid alcohol, marijuana, and illegal drugs. · Find a counselor who uses cognitive-behavioral therapy (CBT). · Don't isolate yourself. Let those closest to you help you. Find someone you can trust and confide in. Talk to that person. · Be safe with medicines. Take your medicines exactly as prescribed. Call your doctor if you think you are having a problem with your medicine. · Practice healthy thinking. How you think can affect how you feel and act. Ask yourself if your thoughts are helpful or unhelpful. If they are unhelpful, you can learn how to change them. · Recognize and accept your anxiety. When you feel anxious, say to yourself, \"This is not an emergency. I feel uncomfortable, but I am not in danger. I can keep going even if I feel anxious. \"  When should you call for help? Call 911  anytime you think you may need emergency care. For example, call if:    · You feel you can't stop from hurting yourself or someone else. Keep the numbers for these national suicide hotlines: 0-537-666-TALK (1-328.271.8007) and 2-275-NFRGLNM (7-809.688.7056). If you or someone you know talks about suicide or feeling hopeless, get help right away. Call your doctor or counselor now or seek immediate medical care if:    · You have new anxiety, or your anxiety gets worse.     · You have been feeling sad, depressed, or hopeless or have lost interest in things that you usually enjoy.     · You do not get better as expected. Where can you learn more? Go to https://micaela.[email or web address] org and sign in to your [employer] account. Enter G123 in the [facility] box to learn more about \"Generalized Anxiety Disorder: Care Instructions. \"     If you do not have an account, please click on the \"Sign Up Now\" link. Current as of: November 3, 2020               Content Version: 12.9  © 2006-2021 Healthwise, [employer]. Care instructions adapted under license by [facility] ([facility]). If you have questions about a medical condition or this instruction, always ask your healthcare professional. [address] any warranty or liability for your use of this information. Patient Education        Well Visit, Ages 25 to 48: Care Instructions  Overview     Well visits can help you stay healthy. Your doctor has checked your overall health and may have suggested ways to take good care of yourself. Your doctor also may have recommended tests. At home, you can help prevent illness with healthy eating, regular exercise, and other steps. Follow-up care is a key part of your treatment and safety. Be sure to make and go to all appointments, and call your doctor if you are having problems. It's also a good idea to know your test results and keep a list of the medicines you take. How can you care for yourself at home? · Get screening tests that you and your doctor decide on. Screening helps find diseases before any symptoms appear. · Eat healthy foods. Choose fruits, vegetables, whole grains, protein, and low-fat dairy foods. Limit fat, especially saturated fat. Reduce salt in your diet. · Limit alcohol. If you are a man, have no more than 2 drinks a day or 14 drinks a week. If you are a woman, have no more than 1 drink a day or 7 drinks a week. · Get at least 30 minutes of physical activity on most days of the week. Walking is a good choice. You also may want to do other activities, such as running, swimming, cycling, or playing tennis or team sports.  Discuss any changes in your exercise program with your doctor. · Reach and stay at a healthy weight. This will lower your risk for many problems, such as obesity, diabetes, heart disease, and high blood pressure. · Do not smoke or allow others to smoke around you. If you need help quitting, talk to your doctor about stop-smoking programs and medicines. These can increase your chances of quitting for good. · Care for your mental health. It is easy to get weighed down by worry and stress. Learn strategies to manage stress, like deep breathing and mindfulness, and stay connected with your family and community. If you find you often feel sad or hopeless, talk with your doctor. Treatment can help. · Talk to your doctor about whether you have any risk factors for sexually transmitted infections (STIs). You can help prevent STIs if you wait to have sex with a new partner (or partners) until you've each been tested for STIs. It also helps if you use condoms (male or female condoms) and if you limit your sex partners to one person who only has sex with you. Vaccines are available for some STIs, such as HPV. · Use birth control if it's important to you to prevent pregnancy. Talk with your doctor about the choices available and what might be best for you. · If you think you may have a problem with alcohol or drug use, talk to your doctor. This includes prescription medicines (such as amphetamines and opioids) and illegal drugs (such as cocaine and methamphetamine). Your doctor can help you figure out what type of treatment is best for you. · Protect your skin from too much sun. When you're outdoors from 10 a.m. to 4 p.m., stay in the shade or cover up with clothing and a hat with a wide brim. Wear sunglasses that block UV rays. Even when it's cloudy, put broad-spectrum sunscreen (SPF 30 or higher) on any exposed skin. · See a dentist one or two times a year for checkups and to have your teeth cleaned. · Wear a seat belt in the car. When should you call for help?   Watch

## 2021-09-14 NOTE — PROGRESS NOTES
Health Maintenance Due   Topic Date Due    COVID-19 Vaccine (1) Never done Declined    Cervical cancer screen  06/06/2020    Flu vaccine (1) 09/01/2021
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
## 2021-09-20 DIAGNOSIS — J30.9 ALLERGIC RHINITIS, UNSPECIFIED SEASONALITY, UNSPECIFIED TRIGGER: ICD-10-CM

## 2021-09-20 NOTE — TELEPHONE ENCOUNTER
Patient's last appointment was : 9/14/2021  Patient's next appointment is : 3/15/2022  Last refilled:3/11/2021

## 2021-09-21 RX ORDER — FLUTICASONE PROPIONATE 50 MCG
SPRAY, SUSPENSION (ML) NASAL
Qty: 16 G | Refills: 1 | Status: SHIPPED | OUTPATIENT
Start: 2021-09-21

## 2021-11-05 ENCOUNTER — NURSE ONLY (OUTPATIENT)
Dept: LAB | Age: 31
End: 2021-11-05

## 2021-11-10 LAB — TESTOSTERONE, FREE W SHGB, FEMALES/CHILDREN: NORMAL

## 2021-11-17 ENCOUNTER — TELEPHONE (OUTPATIENT)
Dept: FAMILY MEDICINE CLINIC | Age: 31
End: 2021-11-17

## 2021-11-17 NOTE — TELEPHONE ENCOUNTER
Remain home in self-quarantine for 10 days   If you must go out in public, you MUST wear a mask over your nose and mouth (would be best to avoid going out in public)   Frequent handwashing   Rest as much as you can  Tylenol for fever/discomfort - take as directed  If you develop new or worsening symptoms to to the ER:  Increase shortness of breath/difficulty breathing, rapid heart rate above 115, coughing up blood, uncontrolled fever, or any other concerning symptoms call 9-1-1 and go directly to the ER  Can get a pulse ox to measure oxygen level - if readings are below 90% please go in and be seen in the ER

## 2021-12-09 ENCOUNTER — OFFICE VISIT (OUTPATIENT)
Dept: FAMILY MEDICINE CLINIC | Age: 31
End: 2021-12-09
Payer: COMMERCIAL

## 2021-12-09 ENCOUNTER — NURSE ONLY (OUTPATIENT)
Dept: LAB | Age: 31
End: 2021-12-09

## 2021-12-09 VITALS
HEIGHT: 61 IN | RESPIRATION RATE: 14 BRPM | BODY MASS INDEX: 28.13 KG/M2 | SYSTOLIC BLOOD PRESSURE: 102 MMHG | HEART RATE: 82 BPM | DIASTOLIC BLOOD PRESSURE: 68 MMHG | WEIGHT: 149 LBS | OXYGEN SATURATION: 97 % | TEMPERATURE: 97.6 F

## 2021-12-09 DIAGNOSIS — R00.2 HEART PALPITATIONS: ICD-10-CM

## 2021-12-09 DIAGNOSIS — H65.93 FLUID LEVEL BEHIND TYMPANIC MEMBRANE OF BOTH EARS: ICD-10-CM

## 2021-12-09 DIAGNOSIS — R42 DIZZINESS: Primary | ICD-10-CM

## 2021-12-09 DIAGNOSIS — R42 DIZZINESS: ICD-10-CM

## 2021-12-09 DIAGNOSIS — Z23 NEED FOR INFLUENZA VACCINATION: ICD-10-CM

## 2021-12-09 LAB
ALBUMIN SERPL-MCNC: 5 G/DL (ref 3.5–5.1)
ALP BLD-CCNC: 74 U/L (ref 38–126)
ALT SERPL-CCNC: 17 U/L (ref 11–66)
ANION GAP SERPL CALCULATED.3IONS-SCNC: 10 MEQ/L (ref 8–16)
AST SERPL-CCNC: 18 U/L (ref 5–40)
BASOPHILS # BLD: 0.5 %
BASOPHILS ABSOLUTE: 0 THOU/MM3 (ref 0–0.1)
BILIRUB SERPL-MCNC: 0.4 MG/DL (ref 0.3–1.2)
BUN BLDV-MCNC: 14 MG/DL (ref 7–22)
CALCIUM SERPL-MCNC: 9.8 MG/DL (ref 8.5–10.5)
CHLORIDE BLD-SCNC: 104 MEQ/L (ref 98–111)
CO2: 27 MEQ/L (ref 23–33)
CREAT SERPL-MCNC: 0.6 MG/DL (ref 0.4–1.2)
EOSINOPHIL # BLD: 6.2 %
EOSINOPHILS ABSOLUTE: 0.4 THOU/MM3 (ref 0–0.4)
ERYTHROCYTE [DISTWIDTH] IN BLOOD BY AUTOMATED COUNT: 12.4 % (ref 11.5–14.5)
ERYTHROCYTE [DISTWIDTH] IN BLOOD BY AUTOMATED COUNT: 44.3 FL (ref 35–45)
GFR SERPL CREATININE-BSD FRML MDRD: > 90 ML/MIN/1.73M2
GLUCOSE BLD-MCNC: 88 MG/DL (ref 70–108)
HCT VFR BLD CALC: 43.9 % (ref 37–47)
HEMOGLOBIN: 14.4 GM/DL (ref 12–16)
IMMATURE GRANS (ABS): 0.01 THOU/MM3 (ref 0–0.07)
IMMATURE GRANULOCYTES: 0.2 %
IRON: 201 UG/DL (ref 50–170)
LYMPHOCYTES # BLD: 26.7 %
LYMPHOCYTES ABSOLUTE: 1.5 THOU/MM3 (ref 1–4.8)
MCH RBC QN AUTO: 31.9 PG (ref 26–33)
MCHC RBC AUTO-ENTMCNC: 32.8 GM/DL (ref 32.2–35.5)
MCV RBC AUTO: 97.1 FL (ref 81–99)
MONOCYTES # BLD: 6.9 %
MONOCYTES ABSOLUTE: 0.4 THOU/MM3 (ref 0.4–1.3)
NUCLEATED RED BLOOD CELLS: 0 /100 WBC
PLATELET # BLD: 147 THOU/MM3 (ref 130–400)
PMV BLD AUTO: 11.7 FL (ref 9.4–12.4)
POTASSIUM SERPL-SCNC: 4.7 MEQ/L (ref 3.5–5.2)
RBC # BLD: 4.52 MILL/MM3 (ref 4.2–5.4)
SEG NEUTROPHILS: 59.5 %
SEGMENTED NEUTROPHILS ABSOLUTE COUNT: 3.5 THOU/MM3 (ref 1.8–7.7)
SODIUM BLD-SCNC: 141 MEQ/L (ref 135–145)
TOTAL IRON BINDING CAPACITY: 350 UG/DL (ref 171–450)
TOTAL PROTEIN: 7.1 G/DL (ref 6.1–8)
TSH SERPL DL<=0.05 MIU/L-ACNC: 0.98 UIU/ML (ref 0.4–4.2)
WBC # BLD: 5.8 THOU/MM3 (ref 4.8–10.8)

## 2021-12-09 PROCEDURE — 90471 IMMUNIZATION ADMIN: CPT | Performed by: NURSE PRACTITIONER

## 2021-12-09 PROCEDURE — 90674 CCIIV4 VAC NO PRSV 0.5 ML IM: CPT | Performed by: NURSE PRACTITIONER

## 2021-12-09 PROCEDURE — 93000 ELECTROCARDIOGRAM COMPLETE: CPT | Performed by: NURSE PRACTITIONER

## 2021-12-09 PROCEDURE — 99214 OFFICE O/P EST MOD 30 MIN: CPT | Performed by: NURSE PRACTITIONER

## 2021-12-09 RX ORDER — OXYMETAZOLINE HYDROCHLORIDE 0.05 G/100ML
2 SPRAY NASAL 2 TIMES DAILY
Qty: 1 EACH | Refills: 0 | Status: SHIPPED | OUTPATIENT
Start: 2021-12-09 | End: 2022-01-08

## 2021-12-09 ASSESSMENT — ENCOUNTER SYMPTOMS
DIARRHEA: 0
BLOOD IN STOOL: 0
ANAL BLEEDING: 0
CONSTIPATION: 0
EYE DISCHARGE: 0
COLOR CHANGE: 0
ABDOMINAL PAIN: 0
SHORTNESS OF BREATH: 0
COUGH: 0
RHINORRHEA: 0
SORE THROAT: 0
ABDOMINAL DISTENTION: 0
NAUSEA: 0
EYE REDNESS: 0
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
## 2021-12-09 NOTE — PROGRESS NOTES
Health Maintenance Due   Topic Date Due    COVID-19 Vaccine (1) Never done    Cervical cancer screen  09/05/2020    Flu vaccine (1) 09/01/2021

## 2021-12-09 NOTE — PATIENT INSTRUCTIONS
Patient Education        Dizziness: Care Instructions  Your Care Instructions  Dizziness is the feeling of unsteadiness or fuzziness in your head. It is different than having vertigo, which is a feeling that the room is spinning or that you are moving or falling. It is also different from lightheadedness, which is the feeling that you are about to faint. It can be hard to know what causes dizziness. Some people feel dizzy when they have migraine headaches. Sometimes bouts of flu can make you feel dizzy. Some medical conditions, such as heart problems or high blood pressure, can make you feel dizzy. Many medicines can cause dizziness, including medicines for high blood pressure, pain, or anxiety. If a medicine causes your symptoms, your doctor may recommend that you stop or change the medicine. If it is a problem with your heart, you may need medicine to help your heart work better. If there is no clear reason for your symptoms, your doctor may suggest watching and waiting for a while to see if the dizziness goes away on its own. Follow-up care is a key part of your treatment and safety. Be sure to make and go to all appointments, and call your doctor if you are having problems. It's also a good idea to know your test results and keep a list of the medicines you take. How can you care for yourself at home? · If your doctor recommends or prescribes medicine, take it exactly as directed. Call your doctor if you think you are having a problem with your medicine. · Do not drive while you feel dizzy. · Try to prevent falls. Steps you can take include:  ? Using nonskid mats, adding grab bars near the tub, and using night-lights. ? Clearing your home so that walkways are free of anything you might trip on.  ? Letting family and friends know that you have been feeling dizzy. This will help them know how to help you. When should you call for help? Call 911 anytime you think you may need emergency care.  For example, call if:    · You passed out (lost consciousness).     · You have dizziness along with symptoms of a heart attack. These may include:  ? Chest pain or pressure, or a strange feeling in the chest.  ? Sweating. ? Shortness of breath. ? Nausea or vomiting. ? Pain, pressure, or a strange feeling in the back, neck, jaw, or upper belly or in one or both shoulders or arms. ? Lightheadedness or sudden weakness. ? A fast or irregular heartbeat.     · You have symptoms of a stroke. These may include:  ? Sudden numbness, tingling, weakness, or loss of movement in your face, arm, or leg, especially on only one side of your body. ? Sudden vision changes. ? Sudden trouble speaking. ? Sudden confusion or trouble understanding simple statements. ? Sudden problems with walking or balance. ? A sudden, severe headache that is different from past headaches. Call your doctor now or seek immediate medical care if:    · You feel dizzy and have a fever, headache, or ringing in your ears.     · You have new or increased nausea and vomiting.     · Your dizziness does not go away or comes back. Watch closely for changes in your health, and be sure to contact your doctor if:    · You do not get better as expected. Where can you learn more? Go to https://Arachnys.PeopLease. org and sign in to your [employer]ometric Security account. Enter S516 in the [facility] box to learn more about \"Dizziness: Care Instructions. \"     If you do not have an account, please click on the \"Sign Up Now\" link. Current as of: July 1, 2021               Content Version: 13.0  © 2006-2021 Healthwise, Incorporated. Care instructions adapted under license by [facility] ([facility]). If you have questions about a medical condition or this instruction, always ask your healthcare professional. [place] [address] any warranty or liability for your use of this information.          Patient Education        Influenza (Flu) Vaccine: Care Instructions  Your Care Instructions     Influenza (flu) is an infection in the lungs and breathing passages. It is caused by the influenza virus. There are different strains, or types, of the flu virus every year. The flu comes on quickly. It can cause a cough, stuffy nose, fever, chills, tiredness, and aches and pains. These symptoms may last up to 10 days. The flu can make you feel very sick, but most of the time it doesn't lead to other problems. But it can cause serious problems in people who are older or who have a long-term illness, such as heart disease or diabetes. You can help prevent the flu by getting a flu vaccine every year, as soon as it is available. You cannot get the flu from the vaccine. The vaccine prevents most cases of the flu. But even when the vaccine doesn't prevent the flu, it can make symptoms less severe and reduce the chance of problems from the flu. Sometimes, young children and people who have an immune system problem may have a slight fever or muscle aches or pains 6 to 12 hours after getting the shot. These symptoms usually last 1 or 2 days. Follow-up care is a key part of your treatment and safety. Be sure to make and go to all appointments, and call your doctor if you are having problems. It's also a good idea to know your test results and keep a list of the medicines you take. Who should get the flu vaccine? Everyone age 7 months or older should get a flu vaccine each year. It lowers the chance of getting and spreading the flu. The vaccine is very important for people who are at high risk for getting other health problems from the flu. This includes:  · Anyone 48years of age or older. · People who live in a long-term care center, such as a nursing home. · All children 6 months through 25years of age. · Adults and children 6 months and older who have long-term heart or lung problems, such as asthma.   · Adults and children 6 months and older who needed medical care or were in a hospital during the past year because of diabetes, chronic kidney disease, or a weak immune system (including HIV or AIDS). · Women who will be pregnant during the flu season. · People who have any condition that can make it hard to breathe or swallow (such as a brain injury or muscle disorders). · People who can give the flu to others who are at high risk for problems from the flu. This includes all health care workers and close contacts of people age 72 or older. Who should not get the flu vaccine? The person who gives the vaccine may tell you not to get it if you:  · Have a severe allergy to eggs or any part of the vaccine. · Have had a severe reaction to a flu vaccine in the past.  · Have had Guillain-Barré syndrome (GBS). · Are sick with a fever. (Get the vaccine when symptoms are gone.)  How can you care for yourself at home? · If you or your child has a sore arm or a slight fever after the vaccine, take an over-the-counter pain medicine, such as acetaminophen (Tylenol) or ibuprofen (Advil, Motrin). Read and follow all instructions on the label. Do not give aspirin to anyone younger than 20. It has been linked to Reye syndrome, a serious illness. · Do not take two or more pain medicines at the same time unless the doctor told you to. Many pain medicines have acetaminophen, which is Tylenol. Too much acetaminophen (Tylenol) can be harmful. When should you call for help? Call 911 anytime you think you may need emergency care. For example, call if after getting the flu vaccine:    · You have symptoms of a severe reaction to the flu vaccine. Symptoms of a severe reaction may include:  ? Severe difficulty breathing. ? Sudden raised, red areas (hives) all over your body. ? Severe lightheadedness. Call your doctor now or seek immediate medical care if after getting the flu vaccine:    · You think you are having a reaction to the flu vaccine, such as a new fever.    Watch closely for changes in your health, and be sure to contact your doctor if you have any problems. Where can you learn more? Go to https://chpepiceweb.healthD.[employer]. org and sign in to your FoodText account. Enter Q075 in the [facility] box to learn more about \"Influenza (Flu) Vaccine: Care Instructions. \"     If you do not have an account, please click on the \"Sign Up Now\" link. Current as of: August 31, 2020               Content Version: 13.0  © 2006-2021 Adyen. Care instructions adapted under license by [facility] ([facility]). If you have questions about a medical condition or this instruction, always ask your healthcare professional. [place] [address] any warranty or liability for your use of this information. Patient Education        Palpitations: Care Instructions  Your Care Instructions     Heart palpitations are the uncomfortable sensation that your heart is beating fast or irregularly. You might feel pounding or fluttering in your chest. It might feel like your heart is skipping a beat. Although palpitations may be caused by a heart problem, they also occur because of stress, fatigue, or use of alcohol, caffeine, or nicotine. Many medicines, including diet pills, antihistamines, decongestants, and some herbal products, can cause heart palpitations. Nearly everyone has palpitations from time to time. Depending on your symptoms, your doctor may need to do more tests to try to find the cause of your palpitations. Follow-up care is a key part of your treatment and safety. Be sure to make and go to all appointments, and call your doctor if you are having problems. It's also a good idea to know your test results and keep a list of the medicines you take. How can you care for yourself at home? · Avoid caffeine, nicotine, and excess alcohol. · Do not take illegal drugs, such as methamphetamines and cocaine.   · Do not take weight loss or diet medicines unless you talk with your doctor first.  · Get plenty of sleep. · Do not overeat. · If you have palpitations again, take deep breaths and try to relax. This may slow a racing heart. · If you start to feel lightheaded, lie down to avoid injuries that might result if you pass out and fall down. · Keep a record of your palpitations and bring it to your next doctor's appointment. Write down:  ? The date and time. ? Your pulse. (If your heart is beating fast, it may be hard to count your pulse.)  ? What you were doing when the palpitations started. ? How long the palpitations lasted. ? Any other symptoms. · If an activity causes palpitations, slow down or stop. Talk to your doctor before you do that activity again. · Take your medicines exactly as prescribed. Call your doctor if you think you are having a problem with your medicine. When should you call for help? Call 911 anytime you think you may need emergency care. For example, call if:    · You passed out (lost consciousness).     · You have symptoms of a heart attack. These may include:  ? Chest pain or pressure, or a strange feeling in the chest.  ? Sweating. ? Shortness of breath. ? Pain, pressure, or a strange feeling in the back, neck, jaw, or upper belly or in one or both shoulders or arms. ? Lightheadedness or sudden weakness. ? A fast or irregular heartbeat. After you call 911, the  may tell you to chew 1 adult-strength or 2 to 4 low-dose aspirin. Wait for an ambulance. Do not try to drive yourself.     · You have symptoms of a stroke. These may include:  ? Sudden numbness, tingling, weakness, or loss of movement in your face, arm, or leg, especially on only one side of your body. ? Sudden vision changes. ? Sudden trouble speaking. ? Sudden confusion or trouble understanding simple statements. ? Sudden problems with walking or balance. ? A sudden, severe headache that is different from past headaches.    Call your doctor now or seek immediate medical care if:    · You have heart palpitations and:  ? Are dizzy or lightheaded, or you feel like you may faint. ? Have new or increased shortness of breath. Watch closely for changes in your health, and be sure to contact your doctor if:    · You continue to have heart palpitations. Where can you learn more? Go to https://chpeelayne.Roundrate. org and sign in to your [employer] Tacones account. Enter R508 in the [facility] box to learn more about \"Palpitations: Care Instructions. \"     If you do not have an account, please click on the \"Sign Up Now\" link. Current as of: April 29, 2021               Content Version: 13.0  © 2006-2021 TellWise. Care instructions adapted under license by [facility] [address] If you have questions about a medical condition or this instruction, always ask your healthcare professional. [place] [address] any warranty or liability for your use of this information. Patient Education        Middle Ear Fluid: Care Instructions  Your Care Instructions     Fluid often builds up inside the ear during a cold or allergies. Usually the fluid drains away, but sometimes a small tube in the ear, called the eustachian tube, stays blocked for months. Symptoms of fluid buildup may include:  · Popping, ringing, or a feeling of fullness or pressure in the ear. · Trouble hearing. · Balance problems and dizziness. In most cases, you can treat yourself at home. Follow-up care is a key part of your treatment and safety. Be sure to make and go to all appointments, and call your doctor if you are having problems. It's also a good idea to know your test results and keep a list of the medicines you take. How can you care for yourself at home? · In most cases, the fluid clears up within a few months without treatment. You may need more tests if the fluid does not clear up after 3 months. · If your doctor prescribed antibiotics, take them as directed.  Do not stop taking them just because you feel better. You need to take the full course of antibiotics. When should you call for help? Call your doctor now or seek immediate medical care if:    · You have symptoms of infection, such as:  ? Increased pain, swelling, warmth, or redness. ? Pus draining from the area. ? A fever. Watch closely for changes in your health, and be sure to contact your doctor if:    · You notice changes in hearing.     · You do not get better as expected. Where can you learn more? Go to https://NxtGen Data Center [employer] Cloud ServicespeXylitol Canada.Responsys. org and sign in to your [employer] account. Enter N384 in the WeddingLovely box to learn more about \"Middle Ear Fluid: Care Instructions. \"     If you do not have an account, please click on the \"Sign Up Now\" link. Current as of: December 2, 2020               Content Version: 13.0  © 2006-2021 Healthwise, Incorporated. Care instructions adapted under license by [facility] ([facility]). If you have questions about a medical condition or this instruction, always ask your healthcare professional. [address] any warranty or liability for your use of this information.

## 2021-12-09 NOTE — PROGRESS NOTES
After obtaining consent, and per orders of Angela Rodriguez CNP, injection of Flucelvax was given IM  in Right deltoid by Jorge Valles LPN. Patient tolerated well and was instructed to report any adverse reaction to me immediately. Patient left office with no apparent reaction.     Immunizations Administered     Name Date Dose Route    Influenza, MDCK Quadv, IM, PF (Flucelvax 2 yrs and older) 12/9/2021 0.5 mL Intramuscular    Site: Deltoid- Right    Lot: [id number]    NDC: 34783-273-41

## 2021-12-20 ENCOUNTER — HOSPITAL ENCOUNTER (OUTPATIENT)
Dept: NON INVASIVE DIAGNOSTICS | Age: 31
Discharge: HOME | End: 2021-12-20
Payer: COMMERCIAL

## 2021-12-20 DIAGNOSIS — R42 DIZZINESS: ICD-10-CM

## 2021-12-20 DIAGNOSIS — R00.2 HEART PALPITATIONS: ICD-10-CM

## 2021-12-20 PROCEDURE — 93246 EXT ECG>7D<15D RECORDING: CPT

## 2021-12-20 NOTE — PROCEDURES
The skin was prepped and a 14 day continous cardiac event monitor was applied. The patient was instructed on the documentation, symptoms, purpose of the monitor, as well as the things to avoid while wearing the monitor. The patient was instructed to remove the monitor on 01/03/22.  Serial Number applied [id number]

## 2021-12-29 DIAGNOSIS — G43.109 MIGRAINE WITH AURA AND WITHOUT STATUS MIGRAINOSUS, NOT INTRACTABLE: Primary | ICD-10-CM

## 2021-12-29 RX ORDER — UBROGEPANT 50 MG/1
TABLET ORAL
Qty: 10 TABLET | Refills: 2 | Status: SHIPPED | OUTPATIENT
Start: 2021-12-29

## 2022-01-04 DIAGNOSIS — G43.109 MIGRAINE WITH AURA AND WITHOUT STATUS MIGRAINOSUS, NOT INTRACTABLE: Primary | ICD-10-CM

## 2022-01-04 RX ORDER — ERENUMAB-AOOE 140 MG/ML
140 INJECTION, SOLUTION SUBCUTANEOUS
Qty: 1 PEN | Refills: 5 | Status: SHIPPED | OUTPATIENT
Start: 2022-01-04 | End: 2022-01-18 | Stop reason: ALTCHOICE

## 2022-01-10 ENCOUNTER — VIRTUAL VISIT (OUTPATIENT)
Dept: NEUROLOGY | Age: 32
End: 2022-01-10
Payer: COMMERCIAL

## 2022-01-10 DIAGNOSIS — M54.2 NECK PAIN: ICD-10-CM

## 2022-01-10 DIAGNOSIS — G43.109 MIGRAINE WITH AURA AND WITHOUT STATUS MIGRAINOSUS, NOT INTRACTABLE: Primary | ICD-10-CM

## 2022-01-10 PROCEDURE — 99213 OFFICE O/P EST LOW 20 MIN: CPT | Performed by: PSYCHIATRY & NEUROLOGY
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
## 2022-01-11 NOTE — PROCEDURES
[address] [place] [address]                                 EVENT MONITOR    PATIENT NAME: Ramona Tamayo                      :        1990  MED REC NO:   [id number]                           ROOM:  ACCOUNT NO:   [de-identified]                           ADMIT DATE: 2021  PROVIDER:     Clemencia Ayala M.D.    TEST TYPE:  Event monitor. CLINICAL HISTORY AND INDICATION:  This is a patient with palpitation. EVENT MONITOR DESCRIPTION:  Event monitor was attached to the patient  between 2021 and 2022. EVENT MONITOR FINDINGS:  Baseline rhythm showed sinus rhythm. There  were no obvious arrhythmias on this event monitor. Pretty much sinus  rhythm. No V-tach, SVT, or pauses. CONCLUSION:  1. Sinus rhythm. 2.  Unremarkable event monitor with no significant arrhythmias.         Evangelina Chapa M.D.    D: 01/10/2022 16:09:26       T: 01/10/2022 21:19:02     [email or web address]  Job#: [id number]     Doc#: [id number]

## 2022-01-18 ENCOUNTER — TELEPHONE (OUTPATIENT)
Dept: NEUROLOGY | Age: 32
End: 2022-01-18

## 2022-01-18 DIAGNOSIS — G43.109 MIGRAINE WITH AURA AND WITHOUT STATUS MIGRAINOSUS, NOT INTRACTABLE: Primary | ICD-10-CM

## 2022-01-18 NOTE — TELEPHONE ENCOUNTER
Insurance denied Mike Clines stating patient needs to try and fail Ajovy and Emgality first, or have contraindication to [facility] [place] and [facility][note 3]. Please advise. Thank you.

## 2022-01-18 NOTE — TELEPHONE ENCOUNTER
Spoke with patient who stated she talked to her insurance and she would like to use the Aimovig copay card as she has commercial insurance. Rod Jung will only cost $5 with the copay card. Informed patient to call office back if any problems occur with copay card. Patient verbalized understanding.

## 2022-02-10 DIAGNOSIS — G43.109 MIGRAINE WITH AURA AND WITHOUT STATUS MIGRAINOSUS, NOT INTRACTABLE: Primary | ICD-10-CM

## 2022-02-10 RX ORDER — ERENUMAB-AOOE 140 MG/ML
140 INJECTION, SOLUTION SUBCUTANEOUS
Qty: 1 PEN | Refills: 5 | Status: SHIPPED | OUTPATIENT
Start: 2022-02-10 | End: 2022-02-14

## 2022-02-14 DIAGNOSIS — G43.109 MIGRAINE WITH AURA AND WITHOUT STATUS MIGRAINOSUS, NOT INTRACTABLE: Primary | ICD-10-CM

## 2022-02-14 RX ORDER — GALCANEZUMAB 120 MG/ML
120 INJECTION, SOLUTION SUBCUTANEOUS
Qty: 1 PEN | Refills: 3 | Status: SHIPPED | OUTPATIENT
Start: 2022-02-14 | End: 2022-06-03

## 2022-02-14 NOTE — TELEPHONE ENCOUNTER
Patient sent [employer] message stating insurance denied Zachery Rojas and copay card did not work. Patient requesting to proceed with [facility] as previously discussed. Please advise. Thank you.

## 2022-02-23 ENCOUNTER — OFFICE VISIT (OUTPATIENT)
Dept: FAMILY MEDICINE CLINIC | Age: 32
End: 2022-02-23
Payer: COMMERCIAL

## 2022-02-23 VITALS
HEIGHT: 61 IN | SYSTOLIC BLOOD PRESSURE: 112 MMHG | RESPIRATION RATE: 16 BRPM | TEMPERATURE: 97.1 F | OXYGEN SATURATION: 98 % | WEIGHT: 152.4 LBS | BODY MASS INDEX: 28.77 KG/M2 | HEART RATE: 70 BPM | DIASTOLIC BLOOD PRESSURE: 80 MMHG

## 2022-02-23 DIAGNOSIS — F41.9 ANXIETY: Primary | ICD-10-CM

## 2022-02-23 DIAGNOSIS — F90.2 ATTENTION DEFICIT HYPERACTIVITY DISORDER (ADHD), COMBINED TYPE: ICD-10-CM

## 2022-02-23 PROCEDURE — 99214 OFFICE O/P EST MOD 30 MIN: CPT | Performed by: NURSE PRACTITIONER

## 2022-02-23 ASSESSMENT — ENCOUNTER SYMPTOMS
DIARRHEA: 0
COLOR CHANGE: 0
NAUSEA: 0
CONSTIPATION: 0
SORE THROAT: 0
RHINORRHEA: 0
ABDOMINAL DISTENTION: 0
ANAL BLEEDING: 0
SHORTNESS OF BREATH: 0
ABDOMINAL PAIN: 0
COUGH: 0
EYE DISCHARGE: 0
EYE REDNESS: 0
BLOOD IN STOOL: 0

## 2022-02-23 ASSESSMENT — PATIENT HEALTH QUESTIONNAIRE - PHQ9
SUM OF ALL RESPONSES TO PHQ QUESTIONS 1-9: 12
1. LITTLE INTEREST OR PLEASURE IN DOING THINGS: 0
7. TROUBLE CONCENTRATING ON THINGS, SUCH AS READING THE NEWSPAPER OR WATCHING TELEVISION: 3
9. THOUGHTS THAT YOU WOULD BE BETTER OFF DEAD, OR OF HURTING YOURSELF: 0
3. TROUBLE FALLING OR STAYING ASLEEP: 3
8. MOVING OR SPEAKING SO SLOWLY THAT OTHER PEOPLE COULD HAVE NOTICED. OR THE OPPOSITE, BEING SO FIGETY OR RESTLESS THAT YOU HAVE BEEN MOVING AROUND A LOT MORE THAN USUAL: 0
SUM OF ALL RESPONSES TO PHQ QUESTIONS 1-9: 12
2. FEELING DOWN, DEPRESSED OR HOPELESS: 0
SUM OF ALL RESPONSES TO PHQ QUESTIONS 1-9: 12
SUM OF ALL RESPONSES TO PHQ9 QUESTIONS 1 & 2: 0
SUM OF ALL RESPONSES TO PHQ QUESTIONS 1-9: 12
6. FEELING BAD ABOUT YOURSELF - OR THAT YOU ARE A FAILURE OR HAVE LET YOURSELF OR YOUR FAMILY DOWN: 1
4. FEELING TIRED OR HAVING LITTLE ENERGY: 3
10. IF YOU CHECKED OFF ANY PROBLEMS, HOW DIFFICULT HAVE THESE PROBLEMS MADE IT FOR YOU TO DO YOUR WORK, TAKE CARE OF THINGS AT HOME, OR GET ALONG WITH OTHER PEOPLE: 1
5. POOR APPETITE OR OVEREATING: 2

## 2022-02-23 NOTE — PATIENT INSTRUCTIONS
Patient Education        Learning About Attention Deficit Hyperactivity Disorder (ADHD) in Adults  What is ADHD? Attention deficit hyperactivity disorder (ADHD) is a condition in which people have a hard time paying attention. Adults with ADHD also may be more active than normal. They tend to act without thinking. ADHD may make it harder for them to focus, get organized, and finish tasks. ADHD most often starts in childhood and lasts into adulthood. Many adults don't know that they have ADHD until their children are diagnosed. Then they begin to see their own symptoms. Doctors don't know what causes ADHD. But it tends to run in families. What are the symptoms? The most common types of ADHD symptoms in adults are attention problems and hyperactivity. Attention problems  Adults with ADHD often find it hard to:  · Finish tasks that don't interest them or aren't easy. But they may become obsessed with activities that they find interesting and enjoy. · Keep relationships. · Focus their attention on conversations, reading materials, or jobs. They may change jobs a lot. · Remember things. They may misplace or lose things. · Pay attention. They are easily distracted. They find it hard to focus on one task. · Think before they act. They may make quick decisions. They may act before they think about the effect of their actions. Hyperactivity  Adults with ADHD may:  · Fidget. They may swing their legs, shift in their seats, or tap their fingers. · Move around a lot. They may feel \"revved up\" or on the go. They may not be able to slow down until they are very tired. · Find it hard to relax. They may feel restless and find it hard to do quiet things like read or watch TV. How does ADHD affect daily life? ADHD in adults may affect:  · Job performance. They may find it hard to organize their work, manage their time, and focus on one task at a time. They may forget, misplace, or lose things.  They may quit their jobs out of boredom. · Relationships. Adults with ADHD may find it hard to focus their attention on conversations. It is hard for them to \"read\" the behavior and moods of others and express their own feelings. · Temper. They may get easily frustrated. This often can make it harder for them to deal with stress. These adults may overreact and have a short, quick temper. · The ability to solve problems. Adults who have a hard time waiting for things they want may act before they think about the effect of their actions. They may take part in risky behaviors. These include unprotected sex, unsafe driving, alcohol and drug use, or unwise business ventures. How is ADHD treated? ADHD can be treated with medicines, behavior training, or counseling. Or it may be a combination of these treatments. Medicines  Stimulant medicines are most often used to treat ADHD. These may include:    · Amphetamines. (Examples are Adderall and Dexedrine).     · Methylphenidate. (Examples are Concerta, Daytrana, Focalin, Metadate, and Ritalin). Other medicines that may be used are:    · Atomoxetine. This includes Strattera, a nonstimulant medicine for ADHD.     · Antihypertensives. These include clonidine (such as Catapres) and guanfacine (such as Tenex).   · Antidepressants. These include bupropion (Wellbutrin). Behavior training  Behavior training can help adults with ADHD learn how to:    · Get organized. A daily organizer or planner can help these adults organize their daily tasks. They can write down appointments and other things they need to remember.     · Decrease distractions. They can set up their work or home environment so that there are fewer things that will distract them. They may find using headphones or a \"white noise\" machine helpful. College students can arrange a quiet living situation. They may need a single dorm room.     · Work on relationships.  Social skills training can help adults with ADHD relate to family, friends, and coworkers. Couples counseling or family therapy can also help improve relationships. Counseling  Counseling is not meant to treat inattention, hyperactivity, or impulsiveness. But it can help with some of the problems that go along with ADHD. These include not getting along well with others and having problems following rules. Where can you learn more? Go to [email or web address] org and sign in to your [employer] account. Enter X705 in the [employer] box to learn more about \"Learning About Attention Deficit Hyperactivity Disorder (ADHD) in Adults. \"     If you do not have an account, please click on the \"Sign Up Now\" link. Current as of: June 16, 2021               Content Version: 13.1  © 2006-2021 Healthwise, [employer]. Care instructions adapted under license by [facility] ([facility]). If you have questions about a medical condition or this instruction, always ask your healthcare professional. [place] [address] any warranty or liability for your use of this information. Patient Education        Attention Deficit Hyperactivity Disorder (ADHD) in Adults: Care Instructions  Your Care Instructions     Attention deficit hyperactivity disorder, or ADHD, is a condition that makes it hard to pay attention. So you may have problems when you try to focus, get organized, and finish tasks. It might make you more active than other people. Or you might do things without thinking first.  ADHD is very common. It usually starts in early childhood. Many adults don't realize they have it until their children are diagnosed. Then they become aware of their own symptoms. Doctors don't know what causes ADHD. But it often runs in families. ADHD can be treated with medicines, behavior training, and counseling. Treatment can improve your life. Follow-up care is a key part of your treatment and safety.  Be sure to make and go to all appointments, and call your doctor if you are having problems. It's also a good idea to know your test results and keep a list of the medicines you take. How can you care for yourself at home? · Learn all you can about ADHD. This will help you and your family understand it better. · Take your medicines exactly as prescribed. Call your doctor if you think you are having a problem with your medicine. You will get more details on the specific medicines your doctor prescribes. · If you miss a dose of your medicine, do not take an extra dose. · If your doctor suggests counseling, find a counselor you like and trust. Talk openly and honestly. Be willing to make some changes. · Find a support group for adults with ADHD. Talking to others with the same problems can help you feel better. It can also give you ideas about how to best cope with the condition. · Get rid of distractions at your work space. Keep your desk clean. Try not to face a window or busy hallway. · Use files, planners, and other tools to keep you organized. · Limit use of alcohol, and do not use illegal drugs. People with ADHD tend to develop substance use disorder more easily than others. Tell your doctor if you need help to quit. Counseling, support groups, and sometimes medicines can help you stay free of alcohol or drugs. · Get at least 30 minutes of physical activity on most days of the week. Exercise has been shown to help people cope with ADHD. Walking is a good choice. You also may want to do other activities, such as running, swimming, cycling, or playing tennis or team sports. When should you call for help? Watch closely for changes in your health, and be sure to contact your doctor if:    · You feel sad a lot or cry all the time.     · You have trouble sleeping, or you sleep too much.     · You find it hard to concentrate, make decisions, or remember things.     · You change how you normally eat.     · You feel guilty for no reason. Where can you learn more?   Go to [email or web address] and sign in to your [employer] account. Enter B196 in the [facility] box to learn more about \"Attention Deficit Hyperactivity Disorder (ADHD) in Adults: Care Instructions. \"     If you do not have an account, please click on the \"Sign Up Now\" link. Current as of: June 16, 2021               Content Version: 13.1  © 6235-0803 [employer]. Care instructions adapted under license by [facility] ([facility]). If you have questions about a medical condition or this instruction, always ask your healthcare professional. [place] [address] any warranty or liability for your use of this information. Patient Education        Learning About Anxiety Disorders  What are anxiety disorders? Anxiety disorders are a type of medical problem. They cause severe anxiety. When you feel anxious, you feel that something bad is about to happen. This feeling interferes with your life. These disorders include:  · Generalized anxiety disorder. You feel worried and stressed about many everyday events and activities. This goes on for several months and disrupts your life on most days. · Panic disorder. You have repeated panic attacks. A panic attack is a sudden, intense fear or anxiety. It may make you feel short of breath. Your heart may pound. · Social anxiety disorder. You feel very anxious about what you will say or do in front of people. For example, you may be scared to talk or eat in public. This problem affects your daily life. · Phobias. You are very scared of a specific object, situation, or activity. For example, you may fear spiders, high places, or small spaces. What are the symptoms? Generalized anxiety disorder  Symptoms may include:  · Feeling worried and stressed about many things almost every day. · Feeling tired or irritable. You may have a hard time concentrating. · Having headaches or muscle aches.   · Having a hard time getting to sleep or staying asleep. Panic disorder  You may have repeated panic attacks when there is no reason for feeling afraid. You may change your daily activities because you worry that you will have another attack. Symptoms may include:  · Intense fear, terror, or anxiety. · Trouble breathing or very fast breathing. · Chest pain or tightness. · A heartbeat that races or is not regular. Social anxiety disorder  Symptoms may include:  · Fear about a social situation, such as eating in front of others or speaking in public. You may worry a lot. Or you may be afraid that something bad will happen. · Anxiety that can cause you to blush, sweat, and feel shaky. · A heartbeat that is faster than normal.  · A hard time focusing. Phobias  Symptoms may include:  · More fear than most people of being around an object, being in a situation, or doing an activity. You might also be stressed about the chance of being around the thing you fear. · Worry about losing control, panicking, fainting, or having physical symptoms like a faster heartbeat when you are around the situation or object. How are these disorders treated? Anxiety disorders can be treated with medicines or counseling. A combination of both may be used. Medicines may include:  · Antidepressants. These may help your symptoms by keeping chemicals in your brain in balance. · Benzodiazepines. These may give you short-term relief of your symptoms. Some people use cognitive-behavioral therapy. A therapist helps you learn to change stressful or bad thoughts into helpful thoughts. Lead a healthy lifestyle  A healthy lifestyle may help you feel better. · Get at least 30 minutes of exercise on most days of the week. Walking is a good choice. · Eat a healthy diet. Include fruits, vegetables, lean proteins, and whole grains in your diet each day. · Try to go to bed at the same time every night. Try for 8 hours of sleep a night. · Find ways to manage stress.  Try relaxation exercises. · Avoid alcohol and illegal drugs. Follow-up care is a key part of your treatment and safety. Be sure to make and go to all appointments, and call your doctor if you are having problems. It's also a good idea to know your test results and keep a list of the medicines you take. Where can you learn more? Go to https://chpepiceweb.[email or web address] org and sign in to your [employer] account. Enter G197 in the [employer] box to learn more about \"Learning About Anxiety Disorders. \"     If you do not have an account, please click on the \"Sign Up Now\" link. Current as of: June 16, 2021               Content Version: 13.1  © 2006-2021 Healthwise, [email or web address]. Care instructions adapted under license by [facility] ([facility]). If you have questions about a medical condition or this instruction, always ask your healthcare professional. [place] [address] any warranty or liability for your use of this information. Patient Education        Generalized Anxiety Disorder: Care Instructions  Overview     We all worry. It's a normal part of life. But when you have generalized anxiety disorder, you worry about lots of things. You have a hard time not worrying. This worry or anxiety interferes with your relationships, work or school, and other areas of your life. You may worry most days about things like money, health, work, or friends. That may make you feel tired, tense, or cranky. It can make it hard to think. It may get in the way of healthy sleep. Counseling and medicine can both work to treat anxiety. They are often used together with lifestyle changes, such as getting enough sleep. Treatment can include a type of counseling called cognitive-behavioral therapy, or CBT. It helps you notice and replace thoughts that make you worry. You also might have counseling along with those closest to you so that they can help. Follow-up care is a key part of your treatment and safety.  Be sure to make and go to all appointments, and call your doctor if you are having problems. It's also a good idea to know your test results and keep a list of the medicines you take. How can you care for yourself at home? · Get at least 30 minutes of exercise on most days of the week. Walking is a good choice. You also may want to do other activities, such as running, swimming, cycling, or playing tennis or team sports. · Learn and do relaxation exercises, such as deep breathing. · Go to bed at the same time every night. Try for 8 to 10 hours of sleep a night. · Avoid alcohol, marijuana, and illegal drugs. · Find a counselor who uses cognitive-behavioral therapy (CBT). · Don't isolate yourself. Let those closest to you help you. Find someone you can trust and confide in. Talk to that person. · Be safe with medicines. Take your medicines exactly as prescribed. Call your doctor if you think you are having a problem with your medicine. · Practice healthy thinking. How you think can affect how you feel and act. Ask yourself if your thoughts are helpful or unhelpful. If they are unhelpful, you can learn how to change them. · Recognize and accept your anxiety. When you feel anxious, say to yourself, \"This is not an emergency. I feel uncomfortable, but I am not in danger. I can keep going even if I feel anxious. \"  When should you call for help? Call 911  anytime you think you may need emergency care. For example, call if:    · You feel you can't stop from hurting yourself or someone else. Keep the numbers for these national suicide hotlines: 2-278-414-TALK (3-693-258-4581) and 0-900-BXEKXYA (7-703-431-711-005-7993). If you or someone you know talks about suicide or feeling hopeless, get help right away.    Call your doctor or counselor now or seek immediate medical care if:    · You have new anxiety, or your anxiety gets worse.     · You have been feeling sad, depressed, or hopeless or have lost interest in things that you usually enjoy.     · You do not get better as expected. Where can you learn more? Go to [email or web address]://Zumboxpepiceweb.[employer]. org and sign in to your [employer] account. Enter G123 in the [facility] box to learn more about \"Generalized Anxiety Disorder: Care Instructions. \"     If you do not have an account, please click on the \"Sign Up Now\" link. Current as of: June 16, 2021               Content Version: 13.1  © 6641-2738 Healthwise, Incorporated. Care instructions adapted under license by [facility] ([facility]). If you have questions about a medical condition or this instruction, always ask your healthcare professional. [address] any warranty or liability for your use of this information.
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
## 2022-02-23 NOTE — PROGRESS NOTES
Health Maintenance Due   Topic Date Due    Depression Monitoring  Never done    Cervical cancer screen  09/05/2020

## 2022-02-23 NOTE — PROGRESS NOTES
Difficulty focusing - trouble retaining information - disorganized thinking - not completing tasks - multiple tasks at one time  Can remember having these symptoms since [facility] - daughter was recently diagnosed with ADHD and now knowing the symptoms she thinks she also has ADHD    Migraines 10 years -   Anxiety/Depression - feels like medication works but some days are better than others - gets frustrated easily     Buspar - 7 years  Zoloft - 7 years  Both were increased in the last 6 months - which helped    Wants a more natural approach to ADHD

## 2022-03-01 DIAGNOSIS — F90.2 ATTENTION DEFICIT HYPERACTIVITY DISORDER (ADHD), COMBINED TYPE: Primary | ICD-10-CM

## 2022-03-01 DIAGNOSIS — F41.9 ANXIETY: ICD-10-CM

## 2022-03-01 RX ORDER — BUPROPION HYDROCHLORIDE 150 MG/1
150 TABLET ORAL EVERY MORNING
Qty: 30 TABLET | Refills: 3 | Status: SHIPPED | OUTPATIENT
Start: 2022-03-01 | End: 2022-03-03

## 2022-03-15 ENCOUNTER — OFFICE VISIT (OUTPATIENT)
Dept: FAMILY MEDICINE CLINIC | Age: 32
End: 2022-03-15
Payer: COMMERCIAL

## 2022-03-15 VITALS
WEIGHT: 156.6 LBS | RESPIRATION RATE: 18 BRPM | OXYGEN SATURATION: 99 % | TEMPERATURE: 97 F | BODY MASS INDEX: 29.57 KG/M2 | HEART RATE: 101 BPM | SYSTOLIC BLOOD PRESSURE: 100 MMHG | DIASTOLIC BLOOD PRESSURE: 60 MMHG | HEIGHT: 61 IN

## 2022-03-15 DIAGNOSIS — F41.9 ANXIETY: ICD-10-CM

## 2022-03-15 DIAGNOSIS — Z00.00 WELLNESS EXAMINATION: Primary | ICD-10-CM

## 2022-03-15 DIAGNOSIS — F90.2 ATTENTION DEFICIT HYPERACTIVITY DISORDER (ADHD), COMBINED TYPE: ICD-10-CM

## 2022-03-15 PROCEDURE — 99213 OFFICE O/P EST LOW 20 MIN: CPT | Performed by: NURSE PRACTITIONER

## 2022-03-15 ASSESSMENT — ENCOUNTER SYMPTOMS
EYE DISCHARGE: 0
COLOR CHANGE: 0
BLOOD IN STOOL: 0
EYE REDNESS: 0
SORE THROAT: 0
ABDOMINAL DISTENTION: 0
COUGH: 0
ANAL BLEEDING: 0
CONSTIPATION: 0
SHORTNESS OF BREATH: 0
ABDOMINAL PAIN: 0
NAUSEA: 0
DIARRHEA: 0
RHINORRHEA: 0

## 2022-03-15 NOTE — PATIENT INSTRUCTIONS
Patient Education        Attention Deficit Hyperactivity Disorder (ADHD) in Adults: Care Instructions  Your Care Instructions     Attention deficit hyperactivity disorder, or ADHD, is a condition that makes it hard to pay attention. So you may have problems when you try to focus, get organized, and finish tasks. It might make you more active than other people. Or you might do things without thinking first.  ADHD is very common. It usually starts in early childhood. Many adults don't realize they have it until their children are diagnosed. Then they become aware of their own symptoms. Doctors don't know what causes ADHD. But it often runs in families. ADHD can be treated with medicines, behavior training, and counseling. Treatment can improve your life. Follow-up care is a key part of your treatment and safety. Be sure to make and go to all appointments, and call your doctor if you are having problems. It's also a good idea to know your test results and keep a list of the medicines you take. How can you care for yourself at home? · Learn all you can about ADHD. This will help you and your family understand it better. · Take your medicines exactly as prescribed. Call your doctor if you think you are having a problem with your medicine. You will get more details on the specific medicines your doctor prescribes. · If you miss a dose of your medicine, do not take an extra dose. · If your doctor suggests counseling, find a counselor you like and trust. Talk openly and honestly. Be willing to make some changes. · Find a support group for adults with ADHD. Talking to others with the same problems can help you feel better. It can also give you ideas about how to best cope with the condition. · Get rid of distractions at your work space. Keep your desk clean. Try not to face a window or busy hallway. · Use files, planners, and other tools to keep you organized.   · Limit use of alcohol, and do not use illegal drugs. People with ADHD tend to develop substance use disorder more easily than others. Tell your doctor if you need help to quit. Counseling, support groups, and sometimes medicines can help you stay free of alcohol or drugs. · Get at least 30 minutes of physical activity on most days of the week. Exercise has been shown to help people cope with ADHD. Walking is a good choice. You also may want to do other activities, such as running, swimming, cycling, or playing tennis or team sports. When should you call for help? Watch closely for changes in your health, and be sure to contact your doctor if:    · You feel sad a lot or cry all the time.     · You have trouble sleeping, or you sleep too much.     · You find it hard to concentrate, make decisions, or remember things.     · You change how you normally eat.     · You feel guilty for no reason. Where can you learn more? Go to https://Voxifypeelayne."[employer] org and sign in to your [employer] Information account. Enter B196 in the Embanet box to learn more about \"Attention Deficit Hyperactivity Disorder (ADHD) in Adults: Care Instructions. \"     If you do not have an account, please click on the \"Sign Up Now\" link. Current as of: June 16, 2021               Content Version: 13.1  © 2006-2021 Healthwise, Incorporated. Care instructions adapted under license by [facility] ([facility]). If you have questions about a medical condition or this instruction, always ask your healthcare professional. [place] [address] any warranty or liability for your use of this information. Patient Education        Learning About Attention Deficit Hyperactivity Disorder (ADHD) in Adults  What is ADHD? Attention deficit hyperactivity disorder (ADHD) is a condition in which people have a hard time paying attention. Adults with ADHD also may be more active than normal. They tend to act without thinking.  ADHD may make it harder for them to focus, get organized, and finish tasks. ADHD most often starts in childhood and lasts into adulthood. Many adults don't know that they have ADHD until their children are diagnosed. Then they begin to see their own symptoms. Doctors don't know what causes ADHD. But it tends to run in families. What are the symptoms? The most common types of ADHD symptoms in adults are attention problems and hyperactivity. Attention problems  Adults with ADHD often find it hard to:  · Finish tasks that don't interest them or aren't easy. But they may become obsessed with activities that they find interesting and enjoy. · Keep relationships. · Focus their attention on conversations, reading materials, or jobs. They may change jobs a lot. · Remember things. They may misplace or lose things. · Pay attention. They are easily distracted. They find it hard to focus on one task. · Think before they act. They may make quick decisions. They may act before they think about the effect of their actions. Hyperactivity  Adults with ADHD may:  · Fidget. They may swing their legs, shift in their seats, or tap their fingers. · Move around a lot. They may feel \"revved up\" or on the go. They may not be able to slow down until they are very tired. · Find it hard to relax. They may feel restless and find it hard to do quiet things like read or watch TV. How does ADHD affect daily life? ADHD in adults may affect:  · Job performance. They may find it hard to organize their work, manage their time, and focus on one task at a time. They may forget, misplace, or lose things. They may quit their jobs out of boredom. · Relationships. Adults with ADHD may find it hard to focus their attention on conversations. It is hard for them to \"read\" the behavior and moods of others and express their own feelings. · Temper. They may get easily frustrated. This often can make it harder for them to deal with stress. These adults may overreact and have a short, quick temper.   · The ability to solve problems. Adults who have a hard time waiting for things they want may act before they think about the effect of their actions. They may take part in risky behaviors. These include unprotected sex, unsafe driving, alcohol and drug use, or unwise business ventures. How is ADHD treated? ADHD can be treated with medicines, behavior training, or counseling. Or it may be a combination of these treatments. Medicines  Stimulant medicines are most often used to treat ADHD. These may include:    · Amphetamines. (Examples are Adderall and Dexedrine).     · Methylphenidate. (Examples are Concerta, Daytrana, Focalin, Metadate, and Ritalin). Other medicines that may be used are:    · Atomoxetine. This includes Strattera, a nonstimulant medicine for ADHD.     · Antihypertensives. These include clonidine (such as Catapres) and guanfacine (such as Tenex).   · Antidepressants. These include bupropion (Wellbutrin). Behavior training  Behavior training can help adults with ADHD learn how to:    · Get organized. A daily organizer or planner can help these adults organize their daily tasks. They can write down appointments and other things they need to remember.     · Decrease distractions. They can set up their work or home environment so that there are fewer things that will distract them. They may find using headphones or a \"white noise\" machine helpful. College students can arrange a quiet living situation. They may need a single dorm room.     · Work on relationships. Social skills training can help adults with ADHD relate to family, friends, and coworkers. Couples counseling or family therapy can also help improve relationships. Counseling  Counseling is not meant to treat inattention, hyperactivity, or impulsiveness. But it can help with some of the problems that go along with ADHD. These include not getting along well with others and having problems following rules. Where can you learn more?   Go to https://chpepiceweb.[employer]. org and sign in to your eShares account. Enter S948 in the NurseGrid box to learn more about \"Learning About Attention Deficit Hyperactivity Disorder (ADHD) in Adults. \"     If you do not have an account, please click on the \"Sign Up Now\" link. Current as of: June 16, 2021               Content Version: 13.1  © 6405-7418 [employer]. Care instructions adapted under license by [facility] ([facility]). If you have questions about a medical condition or this instruction, always ask your healthcare professional. [place] [address] any warranty or liability for your use of this information.

## 2022-03-15 NOTE — PROGRESS NOTES
[id number] [facility] [address] [address]  Dept: [phone]  Dept Fax: [phone]: [phone]    Visit Date: 3/15/2022    Han Miguel a 32 y.o. female who presents today for:   Chief Complaint   Patient presents with    Check-Up     f/u meds     HPI:     Health Habits: With regard to her health habits, she eats 2 meals and 2 snacks per day. She does exercise regularly:   Physical activities include: walking on treadmill, yoga, piliates , 3 times per week, 45 minutes/day. She does take over the counter vitamins. She never had a blood transfusion or tattoo. She wears seatbelts while riding a car. She does not text or talk on the phone while driving. She performs all of her ADL's without problem. She is independent, she cooks, drives, bathes, and gets dressed without assistance. She is . She has 2 children. 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 [address] [place] [address] [employer] [facility]   Topic Date Due    Cervical cancer screen  09/05/2020    COVID-19 Vaccine (3 - Booster for Pfizer series) 06/05/2022    Depression Monitoring  02/23/2023    DTaP/Tdap/Td vaccine (3 - Td or Tdap) 08/13/2029    Flu vaccine  Completed    Hepatitis C screen  Completed    HIV screen  Completed    Hepatitis A vaccine  Aged Out    Hepatitis B vaccine  Aged Out    Hib vaccine  Aged Out    Meningococcal (ACWY) vaccine  Aged Out    Pneumococcal 0-64 years Vaccine  Aged Out    Varicella vaccine  Discontinued     She  reports that she has never smoked. She has never used smokeless tobacco. She reports current alcohol use. She reports that she does not use drugs. .  Her last pap smear was 3  years and it was normal. Her last menstrual cycle was 2weeks ago. She is sexually active. Patient was seen on February 23 with complaints of anxiety and difficulty focusing.   Wanted to try something for ADHD but did not really want to take any prescriptions. Was wondering about something more natural.    Was recommended to try L-Tyrosine daily, patient tried this initially and said that it seemed to help but on time to the office on February 25 stating that she wanted to try a prescription    Wellbutrin 150 mg extended release was sent in for her to take 1 time daily in the morning -patient contacted the office on March 3 stating that the Wellbutrin made her feel much worse, caused stomach pain, no appetite, and difficulty sleeping. Patient requested to switch back to her BuSpar and Zoloft combination and wanted to give the L-Tyrosine more time to work. Doing much better now - switched to taking her depression medication in the morning - helps a lot    Been using the L-tyrosine twice daily - helps a lot with focus.     HPI  Health Maintenance   Topic Date Due    Cervical cancer screen  09/05/2020    COVID-19 Vaccine (3 - Booster for Pfizer series) 06/05/2022    Depression Monitoring  02/23/2023    DTaP/Tdap/Td vaccine (3 - Td or Tdap) 08/13/2029    Flu vaccine  Completed    Hepatitis C screen  Completed    HIV screen  Completed    Hepatitis A vaccine  Aged Out    Hepatitis B vaccine  Aged Out    Hib vaccine  Aged Out    Meningococcal (ACWY) vaccine  Aged Out    Pneumococcal 0-64 years Vaccine  Aged Out    Varicella vaccine  Discontinued       Current Outpatient Medications   Medication Sig Dispense Refill    Galcanezumab-gnlm (EMGALITY) 120 MG/ML SOAJ Inject 120 mg into the skin every 30 days 1 pen 3    UBRELVY 50 MG TABS TAKE 1 TABLET BY MOUTH AS NEEDED (HEADACHE) 10 tablet 2    busPIRone (BUSPAR) 10 MG tablet TAKE 1 TABLET BY MOUTH THREE TIMES A  tablet 1    sertraline (ZOLOFT) 100 MG tablet TAKE 1 TABLET BY MOUTH EVERY DAY 90 tablet 1    fluticasone (FLONASE) 50 MCG/ACT nasal spray SPRAY 2 SPRAYS INTO EACH NOSTRIL EVERY DAY 16 g 1    MAGNESIUM CITRATE PO Take 250 mg by mouth daily      Multiple Vitamins-Minerals (CENTRUM) TABS Take by mouth daily      DHEA 25 MG TABS Take 12.5 mg by mouth daily      loratadine (CLARITIN) 10 MG tablet Take 10 mg by mouth daily      ZINC PO Take by mouth as needed      magnesium (MAGNESIUM-OXIDE) 250 MG TABS tablet Take 250 mg by mouth daily       Omeprazole (PRILOSEC PO) Take 40 mg by mouth daily       acetaminophen (TYLENOL) 325 MG tablet Take 500 mg by mouth every 6 hours as needed for Pain      B Complex-C (SUPER B COMPLEX PO) Take 1 tablet by mouth daily      Cholecalciferol (VITAMIN D) 2000 units CAPS capsule 2,000 a day for 2 weeks and then 4,000 a day (Patient taking differently: Take 5,000 Units by mouth daily ) 60 capsule 5     No current facility-administered medications for this visit. Allergies   Allergen Reactions    Dynapen [Dicloxacillin] Other (See Comments)     Feeling of pins and needles       Subjective:   Review of Systems   Constitutional: Negative for chills, fatigue and fever. HENT: Negative for congestion, ear pain, postnasal drip, rhinorrhea and sore throat. Eyes: Negative for discharge and redness. Respiratory: Negative for cough and shortness of breath. Cardiovascular: Negative for chest pain and leg swelling. Gastrointestinal: Negative for abdominal distention, abdominal pain, anal bleeding, blood in stool, constipation, diarrhea and nausea. Skin: Negative for color change and rash. Neurological: Negative for facial asymmetry, speech difficulty and weakness. Hematological: Does not bruise/bleed easily. Psychiatric/Behavioral: Negative for agitation and confusion. Objective:     Vitals:    03/15/22 0930   BP: 100/60   Site: Right Upper Arm   Position: Sitting   Cuff Size: Medium Adult   Pulse: 101   Resp: 18   Temp: 97 °F (36.1 °C)   TempSrc: Skin   SpO2: 99%   Weight: 156 lb 9.6 oz (71 kg)   Height: 5' 1\" (1.549 m)       Body mass index is 29.59 kg/m².     Wt Readings from Last 3 Encounters:   03/15/22 156 lb 9.6 oz (71 kg) 02/23/22 152 lb 6.4 oz (69.1 kg)   12/09/21 149 lb (67.6 kg)     BP Readings from Last 3 Encounters:   03/15/22 100/60   02/23/22 112/80   12/09/21 102/68       Physical Exam  Constitutional:       General: She is not in acute distress. Appearance: She is well-developed. She is not ill-appearing or diaphoretic. HENT:      Head: Normocephalic and atraumatic. Right Ear: Hearing and external ear normal. No decreased hearing noted. Left Ear: Hearing and external ear normal. No decreased hearing noted. Nose: Nose normal. No nasal deformity. Eyes:      General:         Right eye: No discharge. Left eye: No discharge. Conjunctiva/sclera: Conjunctivae normal.   Pulmonary:      Effort: Pulmonary effort is normal. No respiratory distress. Abdominal:      General: There is no distension. Tenderness: There is no guarding. Musculoskeletal:         General: No tenderness or deformity. Normal range of motion. Cervical back: Normal range of motion and neck supple. Skin:     Coloration: Skin is not pale. Findings: No erythema or rash (On exposed areas). Neurological:      Mental Status: She is alert. Gait: Gait normal.   Psychiatric:         Speech: Speech normal.         Behavior: Behavior normal.         Thought Content: Thought content normal.         Judgment: Judgment normal.         Lab Results   Component Value Date    WBC 4.4 02/24/2022    HGB 13.5 02/24/2022    HCT 40.1 02/24/2022     (L) 02/24/2022    CHOL 226 (H) 05/04/2021    TRIG 72 05/04/2021    HDL 76 05/04/2021    LDLCALC 136 05/04/2021    AST 18 12/09/2021     02/24/2022    K 3.9 02/24/2022     02/24/2022    CREATININE 0.64 02/24/2022    BUN 11 02/24/2022    CO2 26 02/24/2022    TSH 0.983 12/09/2021    GLUF 67 (L) 05/04/2021    LABA1C 5.0 10/21/2015    LABGLOM >90 12/09/2021    MG 2.1 05/04/2021    CALCIUM 9.30 02/24/2022    VITD25 57 05/04/2021       :       Diagnosis Orders   1. Wellness examination     2. Attention deficit hyperactivity disorder (ADHD), combined type     3. Anxiety         :    will stick with current plan      Return in about 6 months (around 9/15/2022), or if symptoms worsen or fail to improve. Placed:  No orders of the defined types were placed in this encounter. Medications Prescribed:  No orders of the defined types were placed in this encounter. Discussed use,benefit, and side effects of prescribed medications. Barriers to medication complianceaddressed. All patient questions answered. Pt voiced understanding.      Electronicallysigned by MELANY Neal CNP on 3/15/2022 at 9:46 AM

## 2022-04-12 ENCOUNTER — HOSPITAL ENCOUNTER (OUTPATIENT)
Dept: GENERAL RADIOLOGY | Age: 32
Discharge: HOME | End: 2022-04-12
Payer: COMMERCIAL

## 2022-04-12 ENCOUNTER — HOSPITAL ENCOUNTER (OUTPATIENT)
Age: 32
Discharge: HOME | End: 2022-04-12
Payer: COMMERCIAL

## 2022-04-12 DIAGNOSIS — M25.511 RIGHT SHOULDER PAIN, UNSPECIFIED CHRONICITY: ICD-10-CM

## 2022-04-12 PROCEDURE — 73030 X-RAY EXAM OF SHOULDER: CPT

## 2022-04-18 ENCOUNTER — TELEPHONE (OUTPATIENT)
Dept: NEUROLOGY | Age: 32
End: 2022-04-18

## 2022-04-18 NOTE — TELEPHONE ENCOUNTER
There is reported injection site reaction, it should improve on its own slowly. I would give it another try next month, (if patient is comfortable). If recurrence of symptoms we may consider using an alternative option. If she is not comfortable to continue Emgality please let me know so I could change it to an alternative.   Charmaine Severs, MD

## 2022-04-18 NOTE — TELEPHONE ENCOUNTER
Patient sent Singly message stating she took Emgality injection Saturday. She is having redness, warmth, and itching at injection site that is worse today. She sent picture attached to [id number]. She has been on Embality since February 2022. Please advise. Thank you.

## 2022-04-20 ENCOUNTER — HOSPITAL ENCOUNTER (OUTPATIENT)
Dept: OCCUPATIONAL THERAPY | Age: 32
Setting detail: THERAPIES SERIES
Discharge: HOME | End: 2022-04-20
Payer: COMMERCIAL

## 2022-04-20 PROCEDURE — 97165 OT EVAL LOW COMPLEX 30 MIN: CPT

## 2022-04-20 PROCEDURE — 97110 THERAPEUTIC EXERCISES: CPT

## 2022-04-20 NOTE — PROGRESS NOTES
** PLEASE SIGN, DATE AND TIME CERTIFICATION BELOW AND RETURN TO [facility] OUTPATIENT REHABILITATION (FAX #: [phone]). ATTEST/CO-SIGN IF ACCESSING VIA [employer]. THANK YOU.**    I certify that I have examined the patient below and determined that Physical Medicine and Rehabilitation service is necessary and that I approve the established plan of care for up to 90 days or as specifically noted. Attestation, signature or co-signature of physician indicates approval of certification requirements.    ________________________ ____________ __________  Physician Signature   Date   Time   [address]  [x] EVALUATION  [] DAILY NOTE (LAND) [] DAILY NOTE (AQUATIC ) [] PROGRESS NOTE [] DISCHARGE NOTE    [x] [address] [facility]   [] [address]    [] [address] [facility]   [] Tam Favors    Date: 2022  Patient Name:  Nadeem Arana  : 1990  MRN: [id number]  CSN: [id number]    Referring Practitioner MELANY Delvalle -*   Diagnosis Pain in right shoulder [M25.511]    Treatment Diagnosis Pain, limited motion, decreased strength right shoulder   Date of Evaluation 22      Functional Outcome Measure Used UEFS   Functional Outcome Score 45/80 (22)       Insurance: Primary: Payor: UMR /  /  / ,   Secondary:    Authorization Information: No precert needed, 60 vs. Combined OT/PT/ST per calendar year   Visit # 1, 1/10 for progress note   Visits Allowed:    Recertification Date: [place] 15, 2022   Physician Follow-Up: May 12, 2022   Physician Orders: Sabrina May and treat   Pertinent History: Pt. States her right shoulder and neck started hurting in January this year, not sure why. She went to the chiropractor for 4 weeks and had manipulations done and tens unit. After 4 weeks, pt. Was discharged from chiropractor but continued to have shoulder pain. So pt. Went to [facility] who have pt.  Injections at her trigger points along upper trap and base of skull. Pt. States these helped for about 1 day but then the pain came back. Pt. Now referred to OT for possible shoulder RC issue. Pt. Did have xrays 4/12/22 which were negative. Pt. Has been prescribed muscle relaxer as well but states she has not noticed a difference with this. SUBJECTIVE: Pt. Reports 5/10 pain in right shoulder at rest and 7/10 with activity - points to upper trap and AC joint. 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[employer]; cares for 2 small children at home     OBJECTIVE:  [address] right handed   Palpation Tenderness to palpation over right AC joint, top of [address] joint to slightly anterior [address] joint; tightness in upper trap   Observation    Posture Slightly forward right shoulder   Edema    Special Tests        ADL's Difficulty picking up laundry basket, pushing vacuum, lifting arm to wash and comb hair, pull laundry out of dryer, has had to stop lifting/picking up the kids due to pain in shoulder   Bed Mobility     Transfers    Balance        Sensation Right Upper Extremity: Intact.  - states she intermittently has a small patch over AC joint area that gets tingly   Coordination WNL           RIGHT UPPER EXTREMITY  RANGE OF MOTION    AROM PROM COMMENTS         Shoulder Flexion 100     Shoulder Extension 48     Shoulder Abduction 90     Shoulder Adduction      Shoulder External Rotation 54     Shoulder Internal Rotation Thumb tip to bra line     Shoulder Range of Motion is [place] [place]  []      Elbow Flexion      Elbow Extension      Forearm Pronation      Forearm Supination      Elbow Range of Motion is [facility] [place]  [x]      Wrist Flexion Wrist Extension      Wrist Radial Deviation      Wrist Ulnar Deviation      Wrist Range of Motion is [facility] [place]  [x]   If no measurement is recorded, no formal assessment was completed for that motion. RIGHT UPPER EXTREMITY  STRENGTH    Strength Rating Comments   Shoulder Flexion 3/5    Shoulder Extension 3+/5    Shoulder Abduction 3-/5    Shoulder Adduction 3-/5 Pt. Very painful for resisted adduction   Shoulder External Rotation 3/5    Shoulder Internal Rotation 3/5    []  Shoulder Strength is grossly WFL. Elbow Flexion     Elbow Extension     Forearm Pronation     Forearm Supination     [x] Elbow Strength is grossly WFL. Wrist Flexion     Wrist Extension     Wrist Radial Deviation     Wrist Ulnar Deviation     [x]  Wrist Strength is grossly WFL. Right Left    Strength Setting:      Pinch Strength Tip Pinch:      Lateral Pinch           If no ratings are recorded, no formal assessment was completed.      TREATMENT   Precautions: General    Pain: 5/10 at rest right shoulder, 7/10 with activity     X in shaded column indicates Activity Completed Today   Modalities Parameters/  Location  Notes/Comments                     Manual Therapy Time/  Technique  Notes/Comments                     Exercises   Sets/  Sec Reps  Notes/Comments   sidelying sleeper stretch 15 sec 3 x    Wall slides with UE's in neutral 5 sec 5 x    Upper trap stretch 15 sec 3 x    scap pinches 1 10 x    Corner stretch 15 sec 3 x                  Activities Time    Notes/Comments   Kinesiotape to right shoulder in Y strip to upper trap for inhibition and 2 I strips anterior to posterior for mechanical correction  x                  Specific Interventions Next Treatment: Ultrasound to right shoulder top of [address] joint, dry needling to trigger points right shoulder, stretching, ROM, trial kinesiotaping, RC strengthening    Activity/Treatment Tolerance:  []  Patient tolerated treatment well  []  Patient limited by fatigue  [x] Patient limited by pain   []  Patient limited by other medical complications  []  Other:     Assessment: Pt. Presents with 3 month history of pain in right shoulder, no specific incident to precipitate the pain. Pt. Has been to 4 weeks of chiropractic treatment and has had injection in trigger points at base of skull and upper trap, however is still having pain. Xrays on 4/12/22 are negative. Pt. Does have 2 small children that she had been picking up. Will see pt. For skilled OT services for modalities, manual techniques, stretching/ROM, HEP, RC strength, and dry needling to decrease pain and improve motion and strength for daily routines. Areas for Improvement: impaired ROM, impaired strength and pain  Prognosis: good    GOALS:  Patient Goal: to decrease pain in right shoulder, be able to  her kids    Short Term Goals:  Time Frame: by 4 weeks  1. Pt. Will demo independence with HEP for right shoulder stretching, ROM and eventual strengthening for decreased pain   2. Pt. Will demo improved AROM right shoulder to flexion= 145, abd= 125, and ER= 70 for ease with washing and styling her hair  3. Pt. Will report decreased pain in right shoulder to no greater than 3/10 at rest for improved sleep      Long Term Goals:  Time Frame: by 8 weeks  1. Pt. Will demo improved strength right shoulder to be able to lift her kids with minimal to no discomfort in right shoulder  2. Pt. Will be able to complete household chores such as laundry (carry laundry basket, get clothes out of dryer) and push vacuum with minimal to no pain in right shoulder    Patient Education:   [x]  HEP/Education Completed: Plan of Care, Goals, purpose and care of kinesiotape, availability of dry needling   Medbridge for HEP: sleeper stretch, upper trap stretch, wall slides, corner stretch, scap pinches  []  No new Education completed  []  Reviewed Prior HEP      [x]  Patient verbalized and/or demonstrated understanding of education provided.   [] Patient unable to verbalize and/or demonstrate understanding of education provided. Will continue education. [x]  Barriers to learning: none    PLAN:  Treatment Recommendations: Strengthening, Range of Motion, Manual Therapy - Soft Tissue Mobilization, Home Exercise Program, Modalities and dry needling     [x]  Plan of care initiated. Plan to see patient 2 times per week for 8 weeks to address the treatment planned outlined above.   []  Continue with current plan of care  []  Modify plan of care as follows:    []  Hold pending physician visit  []  Discharge    Time In 830   Time Out 930   Timed Code Minutes: 20 min   Total Treatment Time: 60 min       Electronically Signed by: Brian Natarajan OTR/L [id number]

## 2022-04-22 ENCOUNTER — HOSPITAL ENCOUNTER (OUTPATIENT)
Dept: OCCUPATIONAL THERAPY | Age: 32
Setting detail: THERAPIES SERIES
Discharge: HOME | End: 2022-04-22
Payer: COMMERCIAL

## 2022-04-22 PROCEDURE — 97140 MANUAL THERAPY 1/> REGIONS: CPT

## 2022-04-22 PROCEDURE — 97110 THERAPEUTIC EXERCISES: CPT

## 2022-04-22 NOTE — PROGRESS NOTES
[address]  [] EVALUATION  [x] DAILY NOTE (LAND) [] DAILY NOTE (AQUATIC ) [] PROGRESS NOTE [] DISCHARGE NOTE    [x] OUTPATIENT REHABILITATION CENTER [facility]   [] [place] [address]    [] [facility]   [] Ade Franco    Date: 2022  Patient Name:  Bonita Srivastava  : 1990  MRN: [id number]  CSN: [id number]    Referring Practitioner MELANY Ewing -*   Diagnosis Pain in right shoulder [M25.511]    Treatment Diagnosis Pain, limited motion, decreased strength right shoulder   Date of Evaluation 22      Functional Outcome Measure Used UEFS   Functional Outcome Score 45/80 (22)       Insurance: Primary: Payor: UMR /  /  / ,   Secondary:    Authorization Information: No precert needed, 60 vs. Combined OT/PT/ST per calendar year   Visit # 2, 2/10 for progress note   Visits Allowed:    Recertification Date: [place] 15, 2022   Physician Follow-Up: May 12, 2022   Physician Orders: Madhu Ramires and treat   Pertinent History: Pt. States her right shoulder and neck started hurting in January this year, not sure why. She went to the chiropractor for 4 weeks and had manipulations done and tens unit. After 4 weeks, pt. Was discharged from chiropractor but continued to have shoulder pain. So pt. Went to [facility] who have pt. Injections at her trigger points along upper trap and base of skull. Pt. States these helped for about 1 day but then the pain came back. Pt. Now referred to OT for possible shoulder RC issue. Pt. Did have xrays 22 which were negative. Pt. Has been prescribed muscle relaxer as well but states she has not noticed a difference with this. SUBJECTIVE: Patient reports kinesiotaping did help some with the pain. Presents today guarded with arm in protective position.     OBJECTIVE:    TREATMENT   Precautions: General    Pain: 5/10 at rest right shoulder, 7/10 with activity     X in shaded column indicates Activity Completed Today   Modalities Parameters/  Location  Notes/Comments                     Manual Therapy Time/  Technique  Notes/Comments   STM to L upper trap, supraspinatus, pec minor, subscapularis  X    Scapular mobilizations  X          Exercises   Sets/  Sec Reps  Notes/Comments   sidelying sleeper stretch 15 sec 3 x    Wall slides with UE's in neutral 5 sec 5 x    Upper trap stretch 15 sec 3 x    scap pinches 1 10     Corner stretch 15 sec 3 x    pec minor stretch while on bolster 30 sec 3 x                                Activities Time    Notes/Comments   Kinesiotape to right shoulder in Y strip to upper trap for inhibition and 2 I strips anterior to posterior for mechanical correction  x                  Reviewed precautions and contraindications with patient. Patient meets criteria for dry needling. Dry needling manual therapy: consisted on the placement of 60 mm needles in the following muscles:  L upper trap, supraspinatus, infraspinatus, pec minor, subscapularis. A 60 mm needle was inserted, piston, rotated, and coned to produce intramuscular mobilization. These techniques were used to restore functional range of motion, reduce muscle spasm and induce healing in the corresponding musculature. (20732)  Clean Technique was utilized today while applying Dry needling treatment. The treatment sites where cleaned with 70% solution of  isopropyl alcohol . The PT washed their hands and utilized treatment gloves along with hand  prior to inserting the needles. All needles where removed and discarded in the appropriate sharps container. MD has given verbal and/or written approval for this treatment.           Specific Interventions Next Treatment: Ultrasound to right shoulder top of [facility] joint, dry needling to trigger points right shoulder, stretching, ROM, trial kinesiotaping, RC strengthening    Activity/Treatment Tolerance:  []  Patient tolerated treatment well  []  Patient limited by fatigue  [x]  Patient limited by pain   []  Patient limited by other medical complications  []  Other:     Assessment: Progressing toward goals. Areas for Improvement: impaired ROM, impaired strength and pain  Prognosis: good    GOALS:  Patient Goal: to decrease pain in right shoulder, be able to  her kids    Short Term Goals:  Time Frame: by 4 weeks  1. Pt. Will demo independence with HEP for right shoulder stretching, ROM and eventual strengthening for decreased pain   2. Pt. Will demo improved AROM right shoulder to flexion= 145, abd= 125, and ER= 70 for ease with washing and styling her hair  3. Pt. Will report decreased pain in right shoulder to no greater than 3/10 at rest for improved sleep      Long Term Goals:  Time Frame: by 8 weeks  1. Pt. Will demo improved strength right shoulder to be able to lift her kids with minimal to no discomfort in right shoulder  2. Pt. Will be able to complete household chores such as laundry (carry laundry basket, get clothes out of dryer) and push vacuum with minimal to no pain in right shoulder    Patient Education:   [x]  HEP/Education Completed: Plan of Care, Goals, purpose and care of kinesiotape, availability of dry needling   Medbridge for HEP: sleeper stretch, upper trap stretch, wall slides, corner stretch, scap pinches  []  No new Education completed  []  Reviewed Prior HEP      [x]  Patient verbalized and/or demonstrated understanding of education provided. []  Patient unable to verbalize and/or demonstrate understanding of education provided. Will continue education. [x]  Barriers to learning: none    PLAN:  Treatment Recommendations: Strengthening, Range of Motion, Manual Therapy - Soft Tissue Mobilization, Home Exercise Program, Modalities and dry needling     []  Plan of care initiated. Plan to see patient 2 times per week for 8 weeks to address the treatment planned outlined above.   [x]  Continue with current plan of care  []  Modify plan of care as follows:    [] Hold pending physician visit  []  Discharge    Time In 0900   Time Out 0945   Timed Code Minutes: 45 min   Total Treatment Time: 45 min       Electronically Signed by:  Bryan MONTOYA/TEO, CHT #[id number]

## 2022-04-26 ENCOUNTER — HOSPITAL ENCOUNTER (OUTPATIENT)
Dept: OCCUPATIONAL THERAPY | Age: 32
Setting detail: THERAPIES SERIES
Discharge: HOME | End: 2022-04-26
Payer: COMMERCIAL

## 2022-04-26 PROCEDURE — 97140 MANUAL THERAPY 1/> REGIONS: CPT

## 2022-04-26 PROCEDURE — 97035 APP MDLTY 1+ULTRASOUND EA 15: CPT

## 2022-04-26 PROCEDURE — 97110 THERAPEUTIC EXERCISES: CPT

## 2022-04-26 NOTE — PROGRESS NOTES
[address]  [] EVALUATION  [x] DAILY NOTE (LAND) [] DAILY NOTE (AQUATIC ) [] PROGRESS NOTE [] DISCHARGE NOTE    [x] OUTPATIENT REHABILITATION CENTER [facility]   [] [place] [address]    [] [facility]   [] Anjel Iqbal    Date: 2022  Patient Name:  Cheo Robins  : 1990  MRN: [id number]  CSN: [id number]    Referring Practitioner MELANY Restrepo -*   Diagnosis Pain in right shoulder [M25.511]    Treatment Diagnosis Pain, limited motion, decreased strength right shoulder   Date of Evaluation 22      Functional Outcome Measure Used UEFS   Functional Outcome Score 45/80 (22)       Insurance: Primary: Payor: UMR /  /  / ,   Secondary:    Authorization Information: No precert needed, 60 vs. Combined OT/PT/ST per calendar year   Visit # 3, 3/10 for progress note   Visits Allowed:    Recertification Date: [place] 15, 2022   Physician Follow-Up: May 12, 2022   Physician Orders: Kp Summers and treat   Pertinent History: Pt. States her right shoulder and neck started hurting in January this year, not sure why. She went to the chiropractor for 4 weeks and had manipulations done and tens unit. After 4 weeks, pt. Was discharged from chiropractor but continued to have shoulder pain. So pt. Went to [facility] who have pt. Injections at her trigger points along upper trap and base of skull. Pt. States these helped for about 1 day but then the pain came back. Pt. Now referred to OT for possible shoulder RC issue. Pt. Did have xrays 22 which were negative. Pt. Has been prescribed muscle relaxer as well but states she has not noticed a difference with this. SUBJECTIVE: Patient reports that she was sore after the dry needling last session but now it feels like it was very effective. Pt. States she did put a pillow under right arm as therapist suggested at last visit and this too seems to have helped.      OBJECTIVE:    TREATMENT   Precautions: General    Pain: 3/10 at rest right shoulder     X in shaded column indicates Activity Completed Today   Modalities Parameters/  Location  Notes/Comments   Ultrasound right upper trap down slightly onto medial border of scapula, 100%, 1 MHz, 1.2 arreguin/cm2  xx Initiated this date               Manual Therapy Time/  Technique  Notes/Comments   STM to L upper trap, supraspinatus, medial border of scapula  xx    Scapular mobilizations  xx          Exercises   Sets/  Sec Reps  Notes/Comments   sidelying sleeper stretch 15 sec 5 xx    Wall slides with UE's in neutral 5 sec 10 xx    Upper trap stretch 15 sec 5 xx    scap pinches 1 10     Corner stretch 15 sec 5 xx    pec minor stretch while on towel roll with overpressure from therapist 15 sec. x3 2 min. total  xx                                Activities Time    Notes/Comments   Kinesiotape to right shoulder in Y strip to upper trap for inhibition and 2 I strips anterior to posterior for mechanical correction   Held kinesiotape this date due to redness of skin and itching. Will reapply at next session                     Specific Interventions Next Treatment: Ultrasound to right shoulder top of [facility] joint, dry needling to trigger points right shoulder, stretching, ROM, trial kinesiotaping, RC strengthening    Activity/Treatment Tolerance:  [x]  Patient tolerated treatment well  []  Patient limited by fatigue  []  Patient limited by pain   []  Patient limited by other medical complications  []  Other:     Assessment: Progressing toward goals. Feels the dry needling, taping, and pillow under arm at night time have helped improve shoulder pain. Initiated ultrasound today. Areas for Improvement: impaired ROM, impaired strength and pain  Prognosis: good    GOALS:  Patient Goal: to decrease pain in right shoulder, be able to  her kids    Short Term Goals:  Time Frame: by 4 weeks  1. Pt.  Will demo independence with HEP for right shoulder stretching, ROM and eventual strengthening for decreased pain   2. Pt. Will demo improved AROM right shoulder to flexion= 145, abd= 125, and ER= 70 for ease with washing and styling her hair  3. Pt. Will report decreased pain in right shoulder to no greater than 3/10 at rest for improved sleep      Long Term Goals:  Time Frame: by 8 weeks  1. Pt. Will demo improved strength right shoulder to be able to lift her kids with minimal to no discomfort in right shoulder  2. Pt. Will be able to complete household chores such as laundry (carry laundry basket, get clothes out of dryer) and push vacuum with minimal to no pain in right shoulder    Patient Education:   []  HEP/Education Completed: Plan of Care, Goals, purpose and care of kinesiotape, availability of dry needling   Medbridge for HEP: sleeper stretch, upper trap stretch, wall slides, corner stretch, scap pinches  []  No new Education completed  [x]  Reviewed Prior HEP      [x]  Patient verbalized and/or demonstrated understanding of education provided. []  Patient unable to verbalize and/or demonstrate understanding of education provided. Will continue education. [x]  Barriers to learning: none    PLAN:  Treatment Recommendations: Strengthening, Range of Motion, Manual Therapy - Soft Tissue Mobilization, Home Exercise Program, Modalities and dry needling     []  Plan of care initiated. Plan to see patient 2 times per week for 8 weeks to address the treatment planned outlined above.   [x]  Continue with current plan of care  []  Modify plan of care as follows:    []  Hold pending physician visit  []  Discharge    Time In 0830   Time Out 0910   Timed Code Minutes: 40 min   Total Treatment Time: 40 min       Electronically Signed by:  LINDSAY Marvin/TEO [id number]

## 2022-04-28 DIAGNOSIS — F41.9 ANXIETY: ICD-10-CM

## 2022-04-28 RX ORDER — SERTRALINE HYDROCHLORIDE 100 MG/1
TABLET, FILM COATED ORAL
Qty: 90 TABLET | Refills: 1 | Status: SHIPPED | OUTPATIENT
Start: 2022-04-28 | End: 2022-06-02 | Stop reason: SDUPTHER

## 2022-04-28 NOTE — TELEPHONE ENCOUNTER
Patient's last appointment was : 3/15/2022  Patient's next appointment is : 9/15/2022  Last refilled: 11/1/21

## 2022-04-29 ENCOUNTER — HOSPITAL ENCOUNTER (OUTPATIENT)
Dept: OCCUPATIONAL THERAPY | Age: 32
Setting detail: THERAPIES SERIES
Discharge: HOME | End: 2022-04-29
Payer: COMMERCIAL

## 2022-04-29 PROCEDURE — 97110 THERAPEUTIC EXERCISES: CPT

## 2022-04-29 PROCEDURE — 97140 MANUAL THERAPY 1/> REGIONS: CPT

## 2022-04-29 NOTE — PROGRESS NOTES
[address]  [] EVALUATION  [x] DAILY NOTE (LAND) [] DAILY NOTE (AQUATIC ) [] PROGRESS NOTE [] DISCHARGE NOTE    [x] OUTPATIENT REHABILITATION [facility]   [] [place] [address]    [] [facility]   [] Nevaeh Titus    Date: 2022  Patient Name:  Bushra Rios  : 1990  MRN: [id number]  CSN: [id number]    Referring Practitioner MELANY Arora -*   Diagnosis Pain in right shoulder [M25.511]    Treatment Diagnosis Pain, limited motion, decreased strength right shoulder   Date of Evaluation 22      Functional Outcome Measure Used UEFS   Functional Outcome Score 45/80 (22)       Insurance: Primary: Payor: UMR /  /  / ,   Secondary:    Authorization Information: No precert needed, 60 vs. Combined OT/PT/ST per calendar year   Visit # 4, 4/10 for progress note   Visits Allowed: 20   Recertification Date: [place] 15, 2022   Physician Follow-Up: May 12, 2022   Physician Orders: Anabel Payne and treat   Pertinent History: Pt. States her right shoulder and neck started hurting in January this year, not sure why. She went to the chiropractor for 4 weeks and had manipulations done and tens unit. After 4 weeks, pt. Was discharged from chiropractor but continued to have shoulder pain. So pt. Went to [facility] who have pt. Injections at her trigger points along upper trap and base of skull. Pt. States these helped for about 1 day but then the pain came back. Pt. Now referred to OT for possible shoulder RC issue. Pt. Did have xrays 22 which were negative. Pt. Has been prescribed muscle relaxer as well but states she has not noticed a difference with this. SUBJECTIVE:  Patient reports less tension after US last session. Combination of interventions in OT sessions is helping her pain.     OBJECTIVE:    TREATMENT   Precautions: General    Pain: 2/10 at rest right shoulder     X in shaded column indicates Activity Completed Today   Modalities Parameters/  Location  Notes/Comments   Ultrasound right upper trap down slightly onto medial border of scapula, 100%, 1 MHz, 1.2 arreguin/cm2   Initiated this date               Manual Therapy Time/  Technique  Notes/Comments   STM to L upper trap, supraspinatus, medial border of scapula  X    Scapular mobilizations  X          Exercises   Sets/  Sec Reps  Notes/Comments   sidelying sleeper stretch 15 sec 5     Wall slides with UE's in neutral 5 sec 10     Upper trap stretch/IR stretch 15 sec 5 X Added towel for IR stretch combined with upper trap stretch   scap pinches 1 10     Corner stretch 15 sec 5 X    pec minor stretch while on towel roll with overpressure from therapist 15 sec. x3 2 min. total      Supine SA punch 1 10 X    sidelying ER  1 10 X                  Activities Time    Notes/Comments   Kinesiotape to right shoulder in Y strip to upper trap for inhibition and 2 I strips anterior to posterior for mechanical correction   Still having irritation of skin on 4/29. Will hold kinesiotaping for now. Reviewed precautions and contraindications with patient. Patient meets criteria for dry needling. Dry needling manual therapy: consisted on the placement of 60 mm needles in the following muscles:  L upper trap, supraspinatus, infraspinatus, pec minor, subscapularis.  A 60 mm needle was inserted, piston, rotated, and coned to produce intramuscular mobilization.  These techniques were used to restore functional range of motion, reduce muscle spasm and induce healing in the corresponding musculature. (71419)  Clean Technique was utilized today while applying Dry needling treatment.  The treatment sites where cleaned with 70% solution of  isopropyl alcohol .  The PT washed their hands and utilized treatment gloves along with hand  prior to inserting the needles.  All needles where removed and discarded in the appropriate sharps container.   MD has given verbal and/or written approval for this treatment. Specific Interventions Next Treatment: Ultrasound to right shoulder top of [facility] joint, dry needling to trigger points right shoulder, stretching, ROM, trial kinesiotaping, RC strengthening    Activity/Treatment Tolerance:  [x]  Patient tolerated treatment well  []  Patient limited by fatigue  []  Patient limited by pain   []  Patient limited by other medical complications  []  Other:     Assessment: Progressing toward goals. Ravinder Wilson has much better ROM now than at evaluation. Started very gentle RC/scapular stabilization exs. And could advance next week to band if pain stays low and ROM is good. IR behind back actively today was 1\" below T8. Full active flexion is noted. Areas for Improvement: impaired ROM, impaired strength and pain  Prognosis: good    GOALS:  Patient Goal: to decrease pain in right shoulder, be able to  her kids    Short Term Goals:  Time Frame: by 4 weeks  1. Pt. Will demo independence with HEP for right shoulder stretching, ROM and eventual strengthening for decreased pain   2. Pt. Will demo improved AROM right shoulder to flexion= 145, abd= 125, and ER= 70 for ease with washing and styling her hair  3. Pt. Will report decreased pain in right shoulder to no greater than 3/10 at rest for improved sleep      Long Term Goals:  Time Frame: by 8 weeks  1. Pt. Will demo improved strength right shoulder to be able to lift her kids with minimal to no discomfort in right shoulder  2.  Pt. Will be able to complete household chores such as laundry (carry laundry basket, get clothes out of dryer) and push vacuum with minimal to no pain in right shoulder    Patient Education:   []  HEP/Education Completed: Plan of Care, Goals, purpose and care of kinesiotape, availability of dry needling   Medbridge for HEP: sleeper stretch, upper trap stretch combined with IR stretch with towel, wall slides, corner stretch, scap pinches  []  No new Education completed  [x]  Reviewed Prior HEP      [x]  Patient verbalized and/or demonstrated understanding of education provided. []  Patient unable to verbalize and/or demonstrate understanding of education provided. Will continue education. [x]  Barriers to learning: none    PLAN:  Treatment Recommendations: Strengthening, Range of Motion, Manual Therapy - Soft Tissue Mobilization, Home Exercise Program, Modalities and dry needling     []  Plan of care initiated. Plan to see patient 2 times per week for 8 weeks to address the treatment planned outlined above.   [x]  Continue with current plan of care  []  Modify plan of care as follows:    []  Hold pending physician visit  []  Discharge    Time In 0845   Time Out 0910   Timed Code Minutes: 40 min   Total Treatment Time: 40 min       Electronically Signed by:  Rica MONTOYA/TEO, CHT #[id number]

## 2022-05-03 ENCOUNTER — APPOINTMENT (OUTPATIENT)
Dept: OCCUPATIONAL THERAPY | Age: 32
End: 2022-05-03
Payer: COMMERCIAL

## 2022-05-04 ENCOUNTER — HOSPITAL ENCOUNTER (OUTPATIENT)
Dept: OCCUPATIONAL THERAPY | Age: 32
Setting detail: THERAPIES SERIES
Discharge: HOME | End: 2022-05-04
Payer: COMMERCIAL

## 2022-05-04 PROCEDURE — 97140 MANUAL THERAPY 1/> REGIONS: CPT

## 2022-05-04 PROCEDURE — 97110 THERAPEUTIC EXERCISES: CPT

## 2022-05-04 NOTE — PROGRESS NOTES
[address]  [] EVALUATION  [x] DAILY NOTE (LAND) [] DAILY NOTE (AQUATIC ) [] PROGRESS NOTE [] DISCHARGE NOTE    [x] OUTPATIENT REHABILITATION CENTER - [place]   [] [address]    [] [address] [facility] [place]   [] [facility]    Date: 2022  Patient Name:  Oscar Hahn  : 1990  MRN: [id number]  CSN: [id number]    Referring Practitioner MELANY Batista -*   Diagnosis Pain in right shoulder [M25.511]    Treatment Diagnosis Pain, limited motion, decreased strength right shoulder   Date of Evaluation 22      Functional Outcome Measure Used UEFS   Functional Outcome Score 45/80 (22)       Insurance: Primary: Payor: UMR /  /  / ,   Secondary:    Authorization Information: No precert needed, 60 vs. Combined OT/PT/ST per calendar year   Visit # 5, 5/10 for progress note   Visits Allowed:    Recertification Date: [place] 15, 2022   Physician Follow-Up: May 12, 2022   Physician Orders: Amarilis Eckert and treat   Pertinent History: Pt. States her right shoulder and neck started hurting in January this year, not sure why. She went to the chiropractor for 4 weeks and had manipulations done and tens unit. After 4 weeks, pt. Was discharged from chiropractor but continued to have shoulder pain. So pt. Went to [facility] who have pt. Injections at her trigger points along upper trap and base of skull. Pt. States these helped for about 1 day but then the pain came back. Pt. Now referred to OT for possible shoulder RC issue. Pt. Did have xrays 22 which were negative. Pt. Has been prescribed muscle relaxer as well but states she has not noticed a difference with this. SUBJECTIVE:  Patient reports she was feeling good until  when she tried to vacuum her bedroom.     OBJECTIVE:    TREATMENT   Precautions: General    Pain: 5/10 at rest right shoulder     X in shaded column indicates Activity Completed Today   Modalities Parameters/  Location Notes/Comments   Ultrasound right upper trap down slightly onto medial border of scapula, 100%, 1 MHz, 1.2 arreguin/cm2   Initiated this date               Manual Therapy Time/  Technique  Notes/Comments   STM to L upper trap, supraspinatus, medial border of scapula  X    Scapular mobilizations  X          Exercises   Sets/  Sec Reps  Notes/Comments   sidelying sleeper stretch 15 sec 5     Wall slides with UE's in neutral 5 sec 10     Upper trap stretch/IR stretch 15 sec 5 X Added towel for IR stretch combined with upper trap stretch   scap pinches 1 10     Corner stretch 15 sec 5 X    pec minor stretch while on towel roll with overpressure from therapist 15 sec. x3 2 min. total      SA isometric on wall 1 10 X    [place] x 10 ea 1 10 X [place] band david well                 Activities Time    Notes/Comments   Kinesiotape to right shoulder in Y strip to upper trap for inhibition and 2 I strips anterior to posterior for mechanical correction   Still having irritation of skin on 4/29. Will hold kinesiotaping for now. Reviewed precautions and contraindications with patient. Patient meets criteria for dry needling. Dry needling manual therapy: consisted on the placement of 60 mm needles in the following muscles:  L upper trap, supraspinatus, infraspinatus, pec minor, subscapularis.  A 60 mm needle was inserted, piston, rotated, and coned to produce intramuscular mobilization.  These techniques were used to restore functional range of motion, reduce muscle spasm and induce healing in the corresponding musculature. (54688)  Clean Technique was utilized today while applying Dry needling treatment.  The treatment sites where cleaned with 70% solution of  isopropyl alcohol .  The PT washed their hands and utilized treatment gloves along with hand  prior to inserting the needles.  All needles where removed and discarded in the appropriate sharps container.   MD has given verbal and/or written approval for this treatment. Specific Interventions Next Treatment: Ultrasound to right shoulder top of [facility] joint, dry needling to trigger points right shoulder, stretching, ROM, trial kinesiotaping, RC strengthening    Activity/Treatment Tolerance:  [x]  Patient tolerated treatment well  []  Patient limited by fatigue  []  Patient limited by pain   []  Patient limited by other medical complications  []  Other:     Assessment: Progressing toward goals. Michael Patel has much better ROM now than at evaluation. Started very gentle RC/scapular stabilization exs. And added to HEP. Instructed her to hold exs. At home if too painful but she tolerated them well today. Areas for Improvement: impaired ROM, impaired strength and pain  Prognosis: good    GOALS:  Patient Goal: to decrease pain in right shoulder, be able to  her kids    Short Term Goals:  Time Frame: by 4 weeks  1. Pt. Will demo independence with HEP for right shoulder stretching, ROM and eventual strengthening for decreased pain   2. Pt. Will demo improved AROM right shoulder to flexion= 145, abd= 125, and ER= 70 for ease with washing and styling her hair  3. Pt. Will report decreased pain in right shoulder to no greater than 3/10 at rest for improved sleep      Long Term Goals:  Time Frame: by 8 weeks  1. Pt. Will demo improved strength right shoulder to be able to lift her kids with minimal to no discomfort in right shoulder  2.  Pt. Will be able to complete household chores such as laundry (carry laundry basket, get clothes out of dryer) and push vacuum with minimal to no pain in right shoulder    Patient Education:   [x]  HEP/Education Completed: Plan of Care, Goals, purpose and care of kinesiotape, availability of dry needling   Medbridge for HEP: sleeper stretch, upper trap stretch combined with IR stretch with towel, wall slides, corner stretch, scap pinches   Added tre and SA isometric strengthening exs. today  []  No new Education completed  [] Reviewed Prior HEP      [x]  Patient verbalized and/or demonstrated understanding of education provided. []  Patient unable to verbalize and/or demonstrate understanding of education provided. Will continue education. [x]  Barriers to learning: none    PLAN:  Treatment Recommendations: Strengthening, Range of Motion, Manual Therapy - Soft Tissue Mobilization, Home Exercise Program, Modalities and dry needling     []  Plan of care initiated. Plan to see patient 2 times per week for 8 weeks to address the treatment planned outlined above.   [x]  Continue with current plan of care  []  Modify plan of care as follows:    []  Hold pending physician visit  []  Discharge    Time In 0845   Time Out 0930   Timed Code Minutes: 45 min   Total Treatment Time: 45 min       Electronically Signed by:  Bryan MONTOYA/TEO, CHT #[id number]

## 2022-05-06 ENCOUNTER — HOSPITAL ENCOUNTER (OUTPATIENT)
Dept: OCCUPATIONAL THERAPY | Age: 32
Setting detail: THERAPIES SERIES
Discharge: HOME | End: 2022-05-06
Payer: COMMERCIAL

## 2022-05-06 PROCEDURE — 97035 APP MDLTY 1+ULTRASOUND EA 15: CPT

## 2022-05-06 PROCEDURE — 97140 MANUAL THERAPY 1/> REGIONS: CPT

## 2022-05-06 PROCEDURE — 97110 THERAPEUTIC EXERCISES: CPT

## 2022-05-06 NOTE — PROGRESS NOTES
[address]  [] EVALUATION  [x] DAILY NOTE (LAND) [] DAILY NOTE (AQUATIC ) [] PROGRESS NOTE [] DISCHARGE NOTE    [x] OUTPATIENT REHABILITATION [facility]   [] [place] [address]    [] [facility]   [] Tam Swenson    Date: 2022  Patient Name:  Nadeem Arana  : 1990  MRN: [id number]  CSN: [id number]    Referring Practitioner MELANY Delvalle -*   Diagnosis Pain in right shoulder [M25.511]    Treatment Diagnosis Pain, limited motion, decreased strength right shoulder   Date of Evaluation 22      Functional Outcome Measure Used UEFS   Functional Outcome Score 45/80 (22)       Insurance: Primary: Payor: UMR /  /  / ,   Secondary:    Authorization Information: No precert needed, 60 vs. Combined OT/PT/ST per calendar year   Visit # 6, 6/10 for progress note   Visits Allowed:    Recertification Date: [place] 15, 2022   Physician Follow-Up: May 12, 2022   Physician Orders: Sabrina May and treat   Pertinent History: Pt. States her right shoulder and neck started hurting in January this year, not sure why. She went to the chiropractor for 4 weeks and had manipulations done and tens unit. After 4 weeks, pt. Was discharged from chiropractor but continued to have shoulder pain. So pt. Went to [facility] who have pt. Injections at her trigger points along upper trap and base of skull. Pt. States these helped for about 1 day but then the pain came back. Pt. Now referred to OT for possible shoulder RC issue. Pt. Did have xrays 22 which were negative. Pt. Has been prescribed muscle relaxer as well but states she has not noticed a difference with this. SUBJECTIVE:  Patient reports she was very sore after last session and having dry needling, but then yesterday her shoulder was feeling good. Shoulder did become a little achy as she went through the day.     OBJECTIVE:    TREATMENT   Precautions: General    Pain: 2/10 at rest right shoulder - mostly upper trap     X in shaded column indicates Activity Completed Today   Modalities Parameters/  Location  Notes/Comments   Ultrasound right upper trap down slightly onto medial border of scapula, 100%, 1 MHz, 1 arreguin/cm2  Xx                Manual Therapy Time/  Technique  Notes/Comments   IASTM to L upper trap, supraspinatus, medial border of scapula  Xx    Scapular mobilizations  Xx          Exercises   Sets/  Sec Reps  Notes/Comments   sidelying sleeper stretch 15 sec 5 Xx    Wall slides with UE's in neutral 5 sec 10     Upper trap stretch combined with  Towel stretch IR  15 sec 5 Xx    scap pinches 1 10     Corner stretch 15 sec 5 Xx    pec minor stretch while on towel roll with overpressure from therapist 15 sec. x3 2 min. total      SA isometric on wall 10 sec 5 Xx    [place] x 10 ea 1 10 Xx orange band david well                 Activities Time    Notes/Comments   Kinesiotape to right shoulder in Y strip to upper trap for inhibition and 2 I strips anterior to posterior for mechanical correction   Still having irritation of skin on 4/29. Will hold kinesiotaping for now. Specific Interventions Next Treatment: Ultrasound to right shoulder top of [address] joint, dry needling to trigger points right shoulder, stretching, ROM, trial kinesiotaping, RC strengthening    Activity/Treatment Tolerance:  [x]  Patient tolerated treatment well  []  Patient limited by fatigue  []  Patient limited by pain   []  Patient limited by other medical complications  []  Other:     Assessment: Progressing toward goals, pain is decreasing. Areas for Improvement: impaired ROM, impaired strength and pain  Prognosis: good    GOALS:  Patient Goal: to decrease pain in right shoulder, be able to  her kids    Short Term Goals:  Time Frame: by 4 weeks  1. Pt. Will demo independence with HEP for right shoulder stretching, ROM and eventual strengthening for decreased pain   2.  Pt. Will demo improved AROM right shoulder to flexion= 145, abd= 125, and ER= 70 for ease with washing and styling her hair  3. Pt. Will report decreased pain in right shoulder to no greater than 3/10 at rest for improved sleep      Long Term Goals:  Time Frame: by 8 weeks  1. Pt. Will demo improved strength right shoulder to be able to lift her kids with minimal to no discomfort in right shoulder  2. Pt. Will be able to complete household chores such as laundry (carry laundry basket, get clothes out of dryer) and push vacuum with minimal to no pain in right shoulder    Patient Education:   []  HEP/Education Completed: Plan of Care, Goals, purpose and care of kinesiotape, availability of dry needling   Medbridge for HEP: sleeper stretch, upper trap stretch combined with IR stretch with towel, wall slides, corner stretch, scap pinches   Added tre and SA isometric strengthening exs. today  [x]  No new Education completed  []  Reviewed Prior HEP      []  Patient verbalized and/or demonstrated understanding of education provided. []  Patient unable to verbalize and/or demonstrate understanding of education provided. Will continue education. [x]  Barriers to learning: none    PLAN:  Treatment Recommendations: Strengthening, Range of Motion, Manual Therapy - Soft Tissue Mobilization, Home Exercise Program, Modalities and dry needling     []  Plan of care initiated. Plan to see patient 2 times per week for 8 weeks to address the treatment planned outlined above.   [x]  Continue with current plan of care  []  Modify plan of care as follows:    []  Hold pending physician visit  []  Discharge    Time In 1415   Time Out 1455   Timed Code Minutes: 40 min   Total Treatment Time: 40 min       Electronically Signed by:  LINDSAY Marvin/TEO [id number]

## 2022-05-09 ENCOUNTER — HOSPITAL ENCOUNTER (OUTPATIENT)
Dept: OCCUPATIONAL THERAPY | Age: 32
Setting detail: THERAPIES SERIES
Discharge: HOME | End: 2022-05-09
Payer: COMMERCIAL

## 2022-05-09 PROCEDURE — 97035 APP MDLTY 1+ULTRASOUND EA 15: CPT

## 2022-05-09 PROCEDURE — 97110 THERAPEUTIC EXERCISES: CPT

## 2022-05-09 NOTE — PROGRESS NOTES
[address]  [] EVALUATION  [x] DAILY NOTE (LAND) [] DAILY NOTE (AQUATIC ) [] PROGRESS NOTE [] DISCHARGE NOTE    [x] [facility] [place]   [] [place] [address]    [] [facility]   [] Ghassan Bowens    Date: 2022  Patient Name:  Norma Delgado  : 1990  MRN: [id number]  CSN: [id number]    Referring Practitioner MELANY Riley -*   Diagnosis Pain in right shoulder [M25.511]    Treatment Diagnosis Pain, limited motion, decreased strength right shoulder   Date of Evaluation 22      Functional Outcome Measure Used UEFS   Functional Outcome Score 45/80 (22)       Insurance: Primary: Payor: UMR /  /  / ,   Secondary:    Authorization Information: No precert needed, 60 vs. Combined OT/PT/ST per calendar year   Visit # 7, 7/10 for progress note   Visits Allowed: 20   Recertification Date: [place] 15, 2022   Physician Follow-Up: May 12, 2022   Physician Orders: Julia Albert and treat   Pertinent History: Pt. States her right shoulder and neck started hurting in January this year, not sure why. She went to the chiropractor for 4 weeks and had manipulations done and tens unit. After 4 weeks, pt. Was discharged from chiropractor but continued to have shoulder pain. So pt. Went to [facility] who have pt. Injections at her trigger points along upper trap and base of skull. Pt. States these helped for about 1 day but then the pain came back. Pt. Now referred to OT for possible shoulder RC issue. Pt. Did have xrays 22 which were negative. Pt. Has been prescribed muscle relaxer as well but states she has not noticed a difference with this. SUBJECTIVE:  Patient reports she felt good completing outdoor activities this weekend. Min tightness with min less pain noted reza in upper trap.      OBJECTIVE:    TREATMENT   Precautions: General    Pain: 2/10 at rest right shoulder - mostly upper trap     X in shaded column indicates Activity Completed Today   Modalities Parameters/  Location  Notes/Comments   Ultrasound right upper trap down slightly onto medial border of scapula, 100%, 1 MHz, 1 arreguin/cm2  X                Manual Therapy Time/  Technique  Notes/Comments   IASTM to L upper trap, supraspinatus, medial border of scapula  X    Scapular mobilizations            Exercises   Sets/  Sec Reps  Notes/Comments   sidelying sleeper stretch 15 sec 5     Wall slides with UE's in neutral 5 sec 10 x    Upper trap stretch combined with  Towel stretch IR  15 sec 5 X    scap pinches 1 10     Corner stretch hands at side and 90 degrees ABD 15 sec 5 x    pec minor stretch while on towel roll with overpressure from therapist 15 sec. x3 2 min. total      SA isometric on wall 10 sec 5     [place] x 10 ea, rows  1 10 x orange band david well   Supine Riivald protocol 1 10 x           Activities Time    Notes/Comments   Kinesiotape to right shoulder in Y strip to upper trap for inhibition and 2 I strips anterior to posterior for mechanical correction   Still having irritation of skin on 4/29. Will hold kinesiotaping for now. Specific Interventions Next Treatment: Ultrasound to right shoulder top of [facility] joint, dry needling to trigger points right shoulder, stretching, ROM, trial kinesiotaping, RC strengthening    Activity/Treatment Tolerance:  [x]  Patient tolerated treatment well  []  Patient limited by fatigue  []  Patient limited by pain   []  Patient limited by other medical complications  []  Other:     Assessment: Progressing toward goals, pain is decreasing. She reports being able to complete light outdoor work with less pain. Initiated additional post/ pericscapular ex this session. Areas for Improvement: impaired ROM, impaired strength and pain  Prognosis: good    GOALS:  Patient Goal: to decrease pain in right shoulder, be able to  her kids    Short Term Goals:  Time Frame: by 4 weeks  1. Pt.  Will demo independence with HEP for right shoulder stretching, ROM and eventual strengthening for decreased pain   2. Pt. Will demo improved AROM right shoulder to flexion= 145, abd= 125, and ER= 70 for ease with washing and styling her hair  3. Pt. Will report decreased pain in right shoulder to no greater than 3/10 at rest for improved sleep      Long Term Goals:  Time Frame: by 8 weeks  1. Pt. Will demo improved strength right shoulder to be able to lift her kids with minimal to no discomfort in right shoulder  2. Pt. Will be able to complete household chores such as laundry (carry laundry basket, get clothes out of dryer) and push vacuum with minimal to no pain in right shoulder    Patient Education:   []  HEP/Education Completed: Plan of Care, Goals, purpose and care of kinesiotape, availability of dry needling   Medbridge for HEP: sleeper stretch, upper trap stretch combined with IR stretch with towel, wall slides, corner stretch, scap pinches   Added tre and SA isometric strengthening exs. today  [x]  No new Education completed  []  Reviewed Prior HEP      []  Patient verbalized and/or demonstrated understanding of education provided. []  Patient unable to verbalize and/or demonstrate understanding of education provided. Will continue education. [x]  Barriers to learning: none    PLAN:  Treatment Recommendations: Strengthening, Range of Motion, Manual Therapy - Soft Tissue Mobilization, Home Exercise Program, Modalities and dry needling     []  Plan of care initiated. Plan to see patient 2 times per week for 8 weeks to address the treatment planned outlined above.   [x]  Continue with current plan of care  []  Modify plan of care as follows:    []  Hold pending physician visit  []  Discharge    Time In 0905   Time Out 0950   Timed Code Minutes: 45 min   Total Treatment Time: 45 min       Electronically Signed by: GEE King OTR/L [id number]

## 2022-05-12 DIAGNOSIS — F41.9 ANXIETY: ICD-10-CM

## 2022-05-12 NOTE — TELEPHONE ENCOUNTER
Last visit- 3/15/2022  Next visit- 9/15/2022    Requested Prescriptions     Pending Prescriptions Disp Refills    busPIRone (BUSPAR) 10 MG tablet [Pharmacy Med Name: BUSPIRONE HCL 10 MG TABLET] 270 tablet 1     Sig: TAKE 1 TABLET BY MOUTH THREE TIMES A DAY

## 2022-05-13 ENCOUNTER — APPOINTMENT (OUTPATIENT)
Dept: OCCUPATIONAL THERAPY | Age: 32
End: 2022-05-13
Payer: COMMERCIAL

## 2022-05-13 RX ORDER — BUSPIRONE HYDROCHLORIDE 10 MG/1
TABLET ORAL
Qty: 270 TABLET | Refills: 1 | Status: SHIPPED | OUTPATIENT
Start: 2022-05-13

## 2022-05-16 ENCOUNTER — HOSPITAL ENCOUNTER (OUTPATIENT)
Dept: OCCUPATIONAL THERAPY | Age: 32
Setting detail: THERAPIES SERIES
Discharge: HOME | End: 2022-05-16
Payer: COMMERCIAL

## 2022-05-16 PROCEDURE — 97110 THERAPEUTIC EXERCISES: CPT

## 2022-05-16 PROCEDURE — 97140 MANUAL THERAPY 1/> REGIONS: CPT

## 2022-05-16 NOTE — PROGRESS NOTES
[address]  [] EVALUATION  [x] DAILY NOTE (LAND) [] DAILY NOTE (AQUATIC ) [] PROGRESS NOTE [] DISCHARGE NOTE    [x] OUTPATIENT REHABILITATION [facility]   [] [place] [address]    [] [facility]   [] Xiomara Frost    Date: 2022  Patient Name:  Aguilar Phipp[place]  : 1990  MRN: [id number]  CSN: [id number]    Referring Practitioner MELANY Fam -*   Diagnosis Pain in right shoulder [M25.511]    Treatment Diagnosis Pain, limited motion, decreased strength right shoulder   Date of Evaluation 22      Functional Outcome Measure Used UEFS   Functional Outcome Score 45/80 (22)       Insurance: Primary: Payor: UMR /  /  / ,   Secondary:    Authorization Information: No precert needed, 60 vs. Combined OT/PT/ST per calendar year   Visit # 8, 8/10 for progress note   Visits Allowed: 20   Recertification Date: [place] 15, 2022   Physician Follow-Up: May 12, 2022   Physician Orders: Cy Cornea and treat   Pertinent History: Pt. States her right shoulder and neck started hurting in January this year, not sure why. She went to the chiropractor for 4 weeks and had manipulations done and tens unit. After 4 weeks, pt. Was discharged from chiropractor but continued to have shoulder pain. So pt. Went to [facility] who have pt. Injections at her trigger points along upper trap and base of skull. Pt. States these helped for about 1 day but then the pain came back. Pt. Now referred to OT for possible shoulder RC issue. Pt. Did have xrays 22 which were negative. Pt. Has been prescribed muscle relaxer as well but states she has not noticed a difference with this. SUBJECTIVE: Patient states overall she is getting better. US and dry needling have been helping her pain.      OBJECTIVE:    TREATMENT   Precautions: General    Pain: 2/10 at rest right shoulder - mostly upper trap     X in shaded column indicates Activity Completed Today   Modalities Parameters/  Location  Notes/Comments   Ultrasound right upper trap down slightly onto medial border of scapula, 100%, 1 MHz, 1 arreguin/cm2                  Manual Therapy Time/  Technique  Notes/Comments   IASTM to L upper trap, supraspinatus, medial border of scapula  X    Scapular mobilizations            Exercises   Sets/  Sec Reps  Notes/Comments   sidelying sleeper stretch 15 sec 5     Wall slides with UE's in neutral 5 sec 10     Upper trap stretch combined with  Towel stretch IR  15 sec 5 X    scap pinches 1 10     Corner stretch hands at side and 90 degrees ABD 15 sec 5     pec minor stretch while on towel roll with overpressure from therapist 15 sec. x3 2 min. total      SA punch using band 1 10 X    SA isometric on wall 1 10 X    [place] x 10 ea, rows  1 10 X orange band david well   Supine Riivald protocol 2 10 X    biodex 90 speed 2 min bwd   X                  Activities Time    Notes/Comments   Kinesiotape to right shoulder in Y strip to upper trap for inhibition and 2 I strips anterior to posterior for mechanical correction   Still having irritation of skin on 4/29. Will hold kinesiotaping for now. Dry needling manual therapy: consisted on the placement of 60 mm needles in the following muscles:  L upper trap, supraspinatus, infraspinatus, pec minor, subscapularis.  A 60 mm needle was inserted, piston, rotated, and coned to produce intramuscular mobilization.  These techniques were used to restore functional range of motion, reduce muscle spasm and induce healing in the corresponding musculature. (45178)  Clean Technique was utilized today while applying Dry needling treatment.  The treatment sites where cleaned with 70% solution of  isopropyl alcohol .  The OT washed their hands and utilized treatment gloves along with hand  prior to inserting the needles.  All needles where removed and discarded in the appropriate sharps container.   MD has given verbal and/or written approval for this treatment.        Specific Interventions Next Treatment: Ultrasound to right shoulder top of [address] joint, dry needling to trigger points right shoulder, stretching, ROM, trial kinesiotaping, RC strengthening    Activity/Treatment Tolerance:  [x]  Patient tolerated treatment well  []  Patient limited by fatigue  []  Patient limited by pain   []  Patient limited by other medical complications  []  Other:     Assessment: Progressing toward goals, pain is decreasing. She reports being able to complete light outdoor work with less pain. Tolerated increased reps of strengthening this date. ROM looking good. Areas for Improvement: impaired ROM, impaired strength and pain  Prognosis: good    GOALS:  Patient Goal: to decrease pain in right shoulder, be able to  her kids    Short Term Goals:  Time Frame: by 4 weeks  1. Pt. Will demo independence with HEP for right shoulder stretching, ROM and eventual strengthening for decreased pain   2. Pt. Will demo improved AROM right shoulder to flexion= 145, abd= 125, and ER= 70 for ease with washing and styling her hair  3. Pt. Will report decreased pain in right shoulder to no greater than 3/10 at rest for improved sleep      Long Term Goals:  Time Frame: by 8 weeks  1. Pt. Will demo improved strength right shoulder to be able to lift her kids with minimal to no discomfort in right shoulder  2.  Pt. Will be able to complete household chores such as laundry (carry laundry basket, get clothes out of dryer) and push vacuum with minimal to no pain in right shoulder    Patient Education:   []  HEP/Education Completed: Plan of Care, Goals, purpose and care of kinesiotape, availability of dry needling   Medbridge for HEP: sleeper stretch, upper trap stretch combined with IR stretch with towel, wall slides, corner stretch, scap pinches   Added tre and SA isometric strengthening exs. today  [x]  No new Education completed  []  Reviewed Prior HEP      []  Patient verbalized and/or demonstrated understanding of education provided. []  Patient unable to verbalize and/or demonstrate understanding of education provided. Will continue education. [x]  Barriers to learning: none    PLAN:  Treatment Recommendations: Strengthening, Range of Motion, Manual Therapy - Soft Tissue Mobilization, Home Exercise Program, Modalities and dry needling     []  Plan of care initiated. Plan to see patient 2 times per week for 8 weeks to address the treatment planned outlined above.   [x]  Continue with current plan of care  []  Modify plan of care as follows:    []  Hold pending physician visit  []  Discharge    Time In 0930   Time Out 1010   Timed Code Minutes: 40 min   Total Treatment Time: 40 min       Electronically Signed by:  Erin MONTOYA/TEO, CHT #[id number]

## 2022-05-19 ENCOUNTER — HOSPITAL ENCOUNTER (OUTPATIENT)
Dept: OCCUPATIONAL THERAPY | Age: 32
Setting detail: THERAPIES SERIES
Discharge: HOME | End: 2022-05-19
Payer: COMMERCIAL

## 2022-05-19 PROCEDURE — 97035 APP MDLTY 1+ULTRASOUND EA 15: CPT

## 2022-05-19 PROCEDURE — 97110 THERAPEUTIC EXERCISES: CPT

## 2022-05-19 NOTE — PROGRESS NOTES
[address]  [] EVALUATION  [] DAILY NOTE (LAND) [] DAILY NOTE (AQUATIC ) [x] PROGRESS NOTE [] DISCHARGE NOTE    [x] [facility] [place]   [] [place] [address]    [] [facility]   [] [facility]    Date: 2022  Patient Name:  Juliet Batista  : 1990  MRN: [id number]  CSN: [id number]    Referring Practitioner MELANY Keita -*   Diagnosis Pain in right shoulder [M25.511]    Treatment Diagnosis Pain, limited motion, decreased strength right shoulder   Date of Evaluation 22      Functional Outcome Measure Used UEFS   Functional Outcome Score 45/80 (22)       Insurance: Primary: Payor: UMR /  /  / ,   Secondary:    Authorization Information: No precert needed, 60 vs. Combined OT/PT/ST per calendar year   Visit # 9, (progress note 22)   Visits Allowed: 20   Recertification Date: [place] 15, 2022   Physician Follow-Up: May 12, 2022   Physician Orders: Misti Wang and treat   Pertinent History: Pt. States her right shoulder and neck started hurting in January this year, not sure why. She went to the chiropractor for 4 weeks and had manipulations done and tens unit. After 4 weeks, pt. Was discharged from chiropractor but continued to have shoulder pain. So pt. Went to [facility] who have pt. Injections at her trigger points along upper trap and base of skull. Pt. States these helped for about 1 day but then the pain came back. Pt. Now referred to OT for possible shoulder RC issue. Pt. Did have xrays 22 which were negative. Pt. Has been prescribed muscle relaxer as well but states she has not noticed a difference with this. SUBJECTIVE: Patient states she went back to see nurse practitioner yesterday and got more injections in right upper trap and anterior shoulder and was given a new muscle relaxer. Pt. States she is feeling good today.      OBJECTIVE:    TREATMENT   Precautions: General    Pain: 1/10 right shoulder - with specific positions     X in shaded column indicates Activity Completed Today   Modalities Parameters/  Location  Notes/Comments   Ultrasound right upper trap down slightly onto medial border of scapula, 100%, 1 MHz, 1 arreguin/cm2  x                Manual Therapy Time/  Technique  Notes/Comments   IASTM to L upper trap, supraspinatus, medial border of scapula  x    Scapular mobilizations            Exercises   Sets/  Sec Reps  Notes/Comments   sidelying sleeper stretch 15 sec 5     Wall slides with UE's in neutral 5 sec 10 x    Upper trap stretch combined with  Towel stretch IR  15 sec 5 x    scap pinches 1 10     Corner stretch hands at side and 90 degrees ABD 15 sec 5     pec minor stretch while on towel roll with overpressure from therapist 15 sec. x3 2 min. total      SA punch using band 1 10     SA isometric on wall 1 10     Rl and rows  1 15 x Increased to 15 reps today   Supine Riivald protocol 2 10     biodex 90 speed 3 min bwd   x                  Activities Time    Notes/Comments   Kinesiotape to right shoulder in Y strip to upper trap for inhibition and 2 I strips anterior to posterior for mechanical correction   Still having irritation of skin on 4/29. Will hold kinesiotaping for now. Specific Interventions Next Treatment: Ultrasound to right shoulder top of [facility] joint, dry needling to trigger points right shoulder, stretching, ROM, trial kinesiotaping, RC strengthening    Activity/Treatment Tolerance:  [x]  Patient tolerated treatment well  []  Patient limited by fatigue  []  Patient limited by pain   []  Patient limited by other medical complications  []  Other:     Assessment: Pt. Has been seen x9 visits on OT for right shoulder pain. Treatment has included ultrasound, dry needling, stretching, ROM, STM and RC strengthening. Pt. Just got more injections in right upper trap yesterday at her follow up with CNP.  Pt. States shoulder is finally starting to feel better and is having less pain. Pt. Is independent with her HEP for stretching. Pt. Reports shoulder is not hurting at rest today and only hurts at 1/10 with specific movements. AROM of right shoulder has improved significantly from evaluation date and now only ABduction is needing further improvement. Pt. Indio's good strength in right shoulder however endurance for activity is impaired. Will continue with current POC for facilitation of abduction of right shoulder and improved strength/endurance of shoulder for housework. Areas for Improvement: impaired ROM, impaired strength and pain  Prognosis: good    GOALS:  Patient Goal: to decrease pain in right shoulder, be able to  her kids    Short Term Goals:  Time Frame: by 4 weeks  1. Pt. Will demo independence with HEP for right shoulder stretching, ROM and eventual strengthening for decreased pain - GOAL MET 5/19/22 WITH INDEPENDENCE WITH STRETCHING PROGRAM   Revised Goal: Pt. Will demo independence with strengthening HEP for RUE for improved endurance strength     2. Pt. Will indio improved AROM right shoulder to flexion= 145, abd= 125, and ER= 70 for ease with washing and styling her hair - GOAL MET 5/19/22 WITH ACTIVE RIGHT SHOULDER FLEXION= 170, ABD= 145 AND ER= 80  Revised Goal: Pt. Will indio improved Abduction right shoulder to 160 for ease with washing hair    3. Pt. Will report decreased pain in right shoulder to no greater than 3/10 at rest for improved sleep - GOAL MET 5/19/22 WITH PT. REPORTING ONLY 1/10 PAIN IN RIGHT SHOULDER WITH CERTAIN MOVEMENTS      Long Term Goals:  Time Frame: by 8 weeks  1. Pt. Will indio improved strength right shoulder to be able to lift her kids with minimal to no discomfort in right shoulder - GOAL MET 5/19/22 WITH PT. REPORTING ONLY MINIMAL DISCOMFORT WHEN PICKING UP HER CHILDREN    2.  Pt. Will be able to complete household chores such as laundry (carry laundry basket, get clothes out of dryer) and push vacuum with minimal to no pain in right shoulder GOAL MET 5/19/22 WITH PT. REPORTING MINIMAL DISCOMFORT WITH CLEANING TASKS HOWEVER FEELS THAT RIGHT ARM GETS TIRED QUICKLY. Revised Goal: Pt. Will demo improved strength and endurance in right shoulder with report of no tiredness in RUE with household chores or cleaning. Patient Education:   []  HEP/Education Completed: Plan of Care, Goals, purpose and care of kinesiotape, availability of dry needling   Medbridge for HEP: sleeper stretch, upper trap stretch combined with IR stretch with towel, wall slides, corner stretch, scap pinches   Added tre and SA isometric strengthening exs. today  []  No new Education completed  [x]  Reviewed todays measurements and discussed goals and POC     [x]  Patient verbalized and/or demonstrated understanding of education provided. []  Patient unable to verbalize and/or demonstrate understanding of education provided. Will continue education. [x]  Barriers to learning: none    PLAN:  Treatment Recommendations: Strengthening, Range of Motion, Manual Therapy - Soft Tissue Mobilization, Home Exercise Program, Modalities and dry needling     []  Plan of care initiated. Plan to see patient 2 times per week for 8 weeks to address the treatment planned outlined above.   [x]  Continue with current plan of care  []  Modify plan of care as follows:    []  Hold pending physician visit  []  Discharge    Time In 1000   Time Out 1045   Timed Code Minutes: 45 min   Total Treatment Time: 45 min       Electronically Signed by:  LINDSAY Hoffman/TEO [id number]

## 2022-05-24 ENCOUNTER — HOSPITAL ENCOUNTER (OUTPATIENT)
Dept: OCCUPATIONAL THERAPY | Age: 32
Setting detail: THERAPIES SERIES
Discharge: HOME | End: 2022-05-24
Payer: COMMERCIAL

## 2022-05-24 PROCEDURE — 97035 APP MDLTY 1+ULTRASOUND EA 15: CPT

## 2022-05-24 PROCEDURE — 97110 THERAPEUTIC EXERCISES: CPT

## 2022-05-24 PROCEDURE — 97140 MANUAL THERAPY 1/> REGIONS: CPT

## 2022-05-24 NOTE — PROGRESS NOTES
[address]  [] EVALUATION  [x] DAILY NOTE (LAND) [] DAILY NOTE (AQUATIC ) [] PROGRESS NOTE [] DISCHARGE NOTE    [x] OUTPATIENT REHABILITATION [facility]ENTER [facility]   [] [facility]    [] [facility]   [] Keila Hough    Date: 2022  Patient Name:  Kasi Triplett  : 1990  MRN: [id number]  CSN: [id number]    Referring Practitioner MELANY Almeida -*   Diagnosis Pain in right shoulder [M25.511]    Treatment Diagnosis Pain, limited motion, decreased strength right shoulder   Date of Evaluation 22      Functional Outcome Measure Used UEFS   Functional Outcome Score 45/80 (22)       Insurance: Primary: Payor: UMR /  /  / ,   Secondary:    Authorization Information: No precert needed, 60 vs. Combined OT/PT/ST per calendar year   Visit # 10,1/10 for PN (progress note 22)   Visits Allowed: 20   Recertification Date: [place] 15, 2022   Physician Follow-Up: May 12, 2022   Physician Orders: Je Maker and treat   Pertinent History: Pt. States her right shoulder and neck started hurting in January this year, not sure why. She went to the chiropractor for 4 weeks and had manipulations done and tens unit. After 4 weeks, pt. Was discharged from chiropractor but continued to have shoulder pain. So pt. Went to [facility] who have pt. Injections at her trigger points along upper trap and base of skull. Pt. States these helped for about 1 day but then the pain came back. Pt. Now referred to OT for possible shoulder RC issue. Pt. Did have xrays 22 which were negative. Pt. Has been prescribed muscle relaxer as well but states she has not noticed a difference with this. SUBJECTIVE: Patient minor soreness noted this date, but improving. Patient reports she is trying to be more active with her shoulder, with reports cleaning her floors and trying as much as she could to utilize RUE. Patient reports relief from injection is wearing off. OBJECTIVE:    TREATMENT   Precautions: General    Pain: 3/10 right shoulder upper trap and medial scap border      X in shaded column indicates Activity Completed Today   Modalities Parameters/  Location  Notes/Comments   Ultrasound right upper trap down slightly onto medial border of scapula, 100%, 1 MHz, 1 arreguin/cm2  x                Manual Therapy Time/  Technique  Notes/Comments   IASTM to L upper trap, supraspinatus, medial border of scapula  x Tightness noted by relief found    Scapular mobilizations            Exercises   Sets/  Sec Reps  Notes/Comments   sidelying sleeper stretch 15 sec 5     Wall slides with UE's in neutral 5 sec 10 x    Upper trap stretch combined with  Towel stretch IR  15 sec 5 x    scap pinches 1 10     Corner stretch hands at side and 90 degrees ABD 15 sec 5     pec minor stretch while on towel roll with overpressure from therapist 15 sec. x3 2 min. total      SA punch using band 1 10     SA isometric on wall 1 10     sidelying abduction  1 5 X Initiated. AROM no weight, min manual scapular depression at end range from therapist. Good tolerance no pain, tightness noted at end range    Rl and rows  1 15 x    Supine Riivald protocol 2 10 x Only 1 set of horizontal abd only this date    biodex 90 speed 3 min bwd   x                  Activities Time    Notes/Comments   Kinesiotape to right shoulder in Y strip to upper trap for inhibition and 2 I strips anterior to posterior for mechanical correction   Still having irritation of skin on 4/29. Will hold kinesiotaping for now.                      Specific Interventions Next Treatment: Ultrasound to right shoulder top of [address] joint, dry needling to trigger points right shoulder, stretching, ROM, trial kinesiotaping, RC strengthening    Activity/Treatment Tolerance:  [x]  Patient tolerated treatment well  []  Patient limited by fatigue  []  Patient limited by pain   []  Patient limited by other medical complications  []  Other: Assessment: Patient progressing towards goals. Continue to progress per progress note with abduction, with initiation of sidelying abduction AROM. Fair tolerance and ROM, tightness at end range, but good control of motion and no pain. Fatigue noted end session, no pain. Areas for Improvement: impaired ROM, impaired strength and pain  Prognosis: good    GOALS:  Patient Goal: to decrease pain in right shoulder, be able to  her kids    Short Term Goals:  Time Frame: by 4 weeks  1. Pt. Will demo independence with HEP for right shoulder stretching, ROM and eventual strengthening for decreased pain - GOAL MET 5/19/22 WITH INDEPENDENCE WITH STRETCHING PROGRAM   Revised Goal: Pt. Will demo independence with strengthening HEP for RUE for improved endurance strength     2. Pt. Will james improved AROM right shoulder to flexion= 145, abd= 125, and ER= 70 for ease with washing and styling her hair - GOAL MET 5/19/22 WITH ACTIVE RIGHT SHOULDER FLEXION= 170, ABD= 145 AND ER= 80  Revised Goal: Pt. Will james improved Abduction right shoulder to 160 for ease with washing hair    3. Pt. Will report decreased pain in right shoulder to no greater than 3/10 at rest for improved sleep - GOAL MET 5/19/22 WITH PT. REPORTING ONLY 1/10 PAIN IN RIGHT SHOULDER WITH CERTAIN MOVEMENTS      Long Term Goals:  Time Frame: by 8 weeks  1. Pt. Will james improved strength right shoulder to be able to lift her kids with minimal to no discomfort in right shoulder - GOAL MET 5/19/22 WITH PT. REPORTING ONLY MINIMAL DISCOMFORT WHEN PICKING UP HER CHILDREN    2. Pt. Will be able to complete household chores such as laundry (carry laundry basket, get clothes out of dryer) and push vacuum with minimal to no pain in right shoulder GOAL MET 5/19/22 WITH PT. REPORTING MINIMAL DISCOMFORT WITH CLEANING TASKS HOWEVER FEELS THAT RIGHT ARM GETS TIRED QUICKLY. Revised Goal: Pt.  Will james improved strength and endurance in right shoulder with report of no tiredness in RUE with household chores or cleaning. Patient Education:   []  HEP/Education Completed: Plan of Care, Goals, purpose and care of kinesiotape, availability of dry needling   Medbridge for HEP: sleeper stretch, upper trap stretch combined with IR stretch with towel, wall slides, corner stretch, scap pinches   Added tre and SA isometric strengthening exs. today  []  No new Education completed  [x]  Reviewed todays measurements and discussed goals and POC     [x]  Patient verbalized and/or demonstrated understanding of education provided. []  Patient unable to verbalize and/or demonstrate understanding of education provided. Will continue education. [x]  Barriers to learning: none    PLAN:  Treatment Recommendations: Strengthening, Range of Motion, Manual Therapy - Soft Tissue Mobilization, Home Exercise Program, Modalities and dry needling     []  Plan of care initiated. Plan to see patient 2 times per week for 8 weeks to address the treatment planned outlined above. [x]  Continue with current plan of care  []  Modify plan of care as follows:    []  Hold pending physician visit  []  Discharge    Time In 0825   Time Out 0906   Timed Code Minutes: 41 min   Total Treatment Time: 41 min       Electronically Signed by:   Lori VERMA #[id number]

## 2022-05-26 ENCOUNTER — HOSPITAL ENCOUNTER (OUTPATIENT)
Dept: OCCUPATIONAL THERAPY | Age: 32
Setting detail: THERAPIES SERIES
Discharge: HOME | End: 2022-05-26
Payer: COMMERCIAL

## 2022-05-26 PROCEDURE — 97110 THERAPEUTIC EXERCISES: CPT

## 2022-05-26 PROCEDURE — 97035 APP MDLTY 1+ULTRASOUND EA 15: CPT

## 2022-05-26 NOTE — PROGRESS NOTES
[address]  [] EVALUATION  [x] DAILY NOTE (LAND) [] DAILY NOTE (AQUATIC ) [] PROGRESS NOTE [] DISCHARGE NOTE    [x] OUTPATIENT REHABILITATION CENTER [facility]   [] [place] [address]    [] [facility]   [] Nevaeh Titus    Date: 2022  Patient Name:  Bushra Rios  : 1990  MRN: [id number]  CSN: [id number]    Referring Practitioner MELANY Arora -*   Diagnosis Pain in right shoulder [M25.511]    Treatment Diagnosis Pain, limited motion, decreased strength right shoulder   Date of Evaluation 22      Functional Outcome Measure Used UEFS   Functional Outcome Score 45/80 (22)       Insurance: Primary: Payor: UMR /  /  / ,   Secondary:    Authorization Information: No precert needed, 60 vs. Combined OT/PT/ST per calendar year   Visit # 11, 2/10 for PN (progress note 22)   Visits Allowed: 20   Recertification Date: [place] 15, 2022   Physician Follow-Up: May 12, 2022   Physician Orders: Anabel Payne and treat   Pertinent History: Pt. States her right shoulder and neck started hurting in January this year, not sure why. She went to the chiropractor for 4 weeks and had manipulations done and tens unit. After 4 weeks, pt. Was discharged from chiropractor but continued to have shoulder pain. So pt. Went to [facility] who have pt. Injections at her trigger points along upper trap and base of skull. Pt. States these helped for about 1 day but then the pain came back. Pt. Now referred to OT for possible shoulder RC issue. Pt. Did have xrays 22 which were negative. Pt. Has been prescribed muscle relaxer as well but states she has not noticed a difference with this. SUBJECTIVE: Patient minor aching in right shoulder today - states she used RUE to push the vacuum.      OBJECTIVE:    TREATMENT   Precautions: General    Pain: 3/10 right shoulder upper trap and medial scap border      X in shaded column indicates Activity Completed Today Modalities Parameters/  Location  Notes/Comments   Ultrasound right upper trap down slightly onto medial border of scapula, 100%, 1 MHz, 1 arreguin/cm2  xx                Manual Therapy Time/  Technique  Notes/Comments   IASTM to L upper trap, supraspinatus, medial border of scapula  xx Tightness noted by relief found    Scapular mobilizations            Exercises   Sets/  Sec Reps  Notes/Comments   sidelying sleeper stretch 15 sec 5     Supine cross body stretch for posterior capsule stretching 15 sec 5 xx    Wall slides with UE's in neutral 5 sec 10     Upper trap stretch combined with  Towel stretch IR  15 sec 5 xx    scap pinches 1 10     Corner stretch hands at side and 90 degrees ABD 15 sec 5     pec minor stretch while on towel roll with overpressure from therapist 15 sec. x3 2 min. total      SA punch using band 1 10 xx    SA isometric on wall 1 10     sidelying abduction  1 5 xx AROM no weight, min manual scapular depression at end range from therapist. Good tolerance no pain    [place] and rows orange band 1 15 xx    Supine Riivald protocol 1 15 xx    biodex 90 speed 3 min bwd   xx                  Activities Time    Notes/Comments   Kinesiotape to right shoulder in Y strip to upper trap for inhibition and 2 I strips anterior to posterior for mechanical correction   Still having irritation of skin on 4/29. Will hold kinesiotaping for now.                      Specific Interventions Next Treatment: Ultrasound to right shoulder top of [facility] joint, dry needling to trigger points right shoulder, stretching, ROM, trial kinesiotaping, RC strengthening    Activity/Treatment Tolerance:  [x]  Patient tolerated treatment well  []  Patient limited by fatigue  []  Patient limited by pain   []  Patient limited by other medical complications  []  Other:     Assessment: Patient progressing towards goals, continued with periscapular strengthening    Areas for Improvement: impaired ROM, impaired strength and pain  Prognosis: good    GOALS:  Patient Goal: to decrease pain in right shoulder, be able to  her kids    Short Term Goals:  Time Frame: by 4 weeks  1. Pt. Will demo independence with HEP for right shoulder stretching, ROM and eventual strengthening for decreased pain - GOAL MET 5/19/22 WITH INDEPENDENCE WITH STRETCHING PROGRAM   Revised Goal: Pt. Will demo independence with strengthening HEP for RUE for improved endurance strength     2. Pt. Will ajmes improved AROM right shoulder to flexion= 145, abd= 125, and ER= 70 for ease with washing and styling her hair - GOAL MET 5/19/22 WITH ACTIVE RIGHT SHOULDER FLEXION= 170, ABD= 145 AND ER= 80  Revised Goal: Pt. Will james improved Abduction right shoulder to 160 for ease with washing hair    3. Pt. Will report decreased pain in right shoulder to no greater than 3/10 at rest for improved sleep - GOAL MET 5/19/22 WITH PT. REPORTING ONLY 1/10 PAIN IN RIGHT SHOULDER WITH CERTAIN MOVEMENTS      Long Term Goals:  Time Frame: by 8 weeks  1. Pt. Will james improved strength right shoulder to be able to lift her kids with minimal to no discomfort in right shoulder - GOAL MET 5/19/22 WITH PT. REPORTING ONLY MINIMAL DISCOMFORT WHEN PICKING UP HER CHILDREN    2. Pt. Will be able to complete household chores such as laundry (carry laundry basket, get clothes out of dryer) and push vacuum with minimal to no pain in right shoulder GOAL MET 5/19/22 WITH PT. REPORTING MINIMAL DISCOMFORT WITH CLEANING TASKS HOWEVER FEELS THAT RIGHT ARM GETS TIRED QUICKLY. Revised Goal: Pt. Will james improved strength and endurance in right shoulder with report of no tiredness in RUE with household chores or cleaning.     Patient Education:   []  HEP/Education Completed: Plan of Care, Goals, purpose and care of kinesiotape, availability of dry needling   Medbridge for HEP: sleeper stretch, upper trap stretch combined with IR stretch with towel, wall slides, corner stretch, scap pinches   Added tre and SA isometric strengthening exs. today  [x]  No new Education completed  []  Reviewed todays measurements and discussed goals and POC     []  Patient verbalized and/or demonstrated understanding of education provided. []  Patient unable to verbalize and/or demonstrate understanding of education provided. Will continue education. [x]  Barriers to learning: none    PLAN:  Treatment Recommendations: Strengthening, Range of Motion, Manual Therapy - Soft Tissue Mobilization, Home Exercise Program, Modalities and dry needling     []  Plan of care initiated. Plan to see patient 2 times per week for 8 weeks to address the treatment planned outlined above.   [x]  Continue with current plan of care  []  Modify plan of care as follows:    []  Hold pending physician visit  []  Discharge    Time In 1515   Time Out 1600   Timed Code Minutes: 45 min   Total Treatment Time: 45 min       Electronically Signed by:  LINDSAY Jarrett/TEO [id number]

## 2022-06-02 ENCOUNTER — HOSPITAL ENCOUNTER (OUTPATIENT)
Dept: OCCUPATIONAL THERAPY | Age: 32
Setting detail: THERAPIES SERIES
Discharge: HOME | End: 2022-06-02
Payer: COMMERCIAL

## 2022-06-02 DIAGNOSIS — G43.109 MIGRAINE WITH AURA AND WITHOUT STATUS MIGRAINOSUS, NOT INTRACTABLE: Primary | ICD-10-CM

## 2022-06-02 DIAGNOSIS — F41.9 ANXIETY: ICD-10-CM

## 2022-06-02 PROCEDURE — 97530 THERAPEUTIC ACTIVITIES: CPT

## 2022-06-02 PROCEDURE — 97140 MANUAL THERAPY 1/> REGIONS: CPT

## 2022-06-02 PROCEDURE — 97035 APP MDLTY 1+ULTRASOUND EA 15: CPT

## 2022-06-02 RX ORDER — SERTRALINE HYDROCHLORIDE 100 MG/1
TABLET, FILM COATED ORAL
Qty: 90 TABLET | Refills: 1 | Status: SHIPPED | OUTPATIENT
Start: 2022-06-02

## 2022-06-02 NOTE — PROGRESS NOTES
[address]  [] EVALUATION  [x] DAILY NOTE (LAND) [] DAILY NOTE (AQUATIC ) [] PROGRESS NOTE [] DISCHARGE NOTE    [x] OUTPATIENT REHABILITATION CENTER [facility]   [] [place] [address]    [] [facility]   [] She[facility] Flight    Date: 2022  Patient Name:  Adama Alvares  : 1990  MRN: [id number]  CSN: [id number]    Referring Practitioner MELANY Bueno -*   Diagnosis Pain in right shoulder [M25.511]    Treatment Diagnosis Pain, limited motion, decreased strength right shoulder   Date of Evaluation 22      Functional Outcome Measure Used UEFS   Functional Outcome Score 45/80 (22)       Insurance: Primary: Payor: UMR /  /  / ,   Secondary:    Authorization Information: No precert needed, 60 vs. Combined OT/PT/ST per calendar year   Visit # 12, 3/10 for PN (progress note 22)   Visits Allowed: 20   Recertification Date: [place] 15, 2022   Physician Follow-Up: May 12, 2022   Physician Orders: Sravanthi Riggins and treat   Pertinent History: Pt. States her right shoulder and neck started hurting in January this year, not sure why. She went to the chiropractor for 4 weeks and had manipulations done and tens unit. After 4 weeks, pt. Was discharged from chiropractor but continued to have shoulder pain. So pt. Went to [facility] who have pt. Injections at her trigger points along upper trap and base of skull. Pt. States these helped for about 1 day but then the pain came back. Pt. Now referred to OT for possible shoulder RC issue. Pt. Did have xrays 22 which were negative. Pt. Has been prescribed muscle relaxer as well but states she has not noticed a difference with this. SUBJECTIVE: 4/10 pain, She went camping and kayaking this past weekend and tried cleaning her house; this has increased her pain quite a bit in her neck/shoulder. Feels like dry needling really helps. Also using an acupuncture mat at home for pain relief and doing stretches. OBJECTIVE:    TREATMENT   Precautions: General    Pain: 3/10 right shoulder upper trap and medial scap border      X in shaded column indicates Activity Completed Today   Modalities Parameters/  Location  Notes/Comments   Ultrasound right upper trap down slightly onto medial border of scapula, 100%, 1 MHz, 1 arreguin/cm2  xx Pt reports pain relief for about a day after US               Manual Therapy Time/  Technique  Notes/Comments   IASTM to L upper trap, supraspinatus, medial border of scapula  xx Tight/knotted tissue palpated, softening noted but still tight after IASTM   Scapular mobilizations            Exercises   Sets/  Sec Reps  Notes/Comments   sidelying sleeper stretch 15 sec 5     Supine cross body stretch for posterior capsule stretching 15 sec 5 xx    Wall slides with UE's in neutral 5 sec 10     Upper trap stretch combined with  Towel stretch IR  15 sec 3 xx Upper trap and levator scap stretch with active release/STM   scap pinches 1 10     Corner stretch hands at side and 90 degrees ABD 15 sec 5     pec minor stretch while on towel roll with overpressure from therapist 15 sec. x3 2 min. total      SA punch using band 1 10 xx 1# weight, supine   SA isometric on wall 1 10     sidelying abduction  1 10 xx 1#   Sidelying ER 1 10 x 1# cues for scapular position, mild \"icy hot\" feeling in anterior shoulder after this   Rl and rows orange band 1 15     Supine Riivald protocol 1 15     biodex 90 speed 3 min bwd       Supine flex  1 10 x 1# to 125 deg- 2/10 pain          Activities Time    Notes/Comments   Kinesiotape to right shoulder in Y strip to upper trap for inhibition and 2 I strips anterior to posterior for mechanical correction   Still having irritation of skin on 4/29. Will hold kinesiotaping for now.                      Specific Interventions Next Treatment: Ultrasound to right shoulder top of [address] joint, dry needling to trigger points right shoulder, stretching, ROM, trial kinesiotaping, RC strengthening    Activity/Treatment Tolerance:  [x]  Patient tolerated treatment well  []  Patient limited by fatigue  []  Patient limited by pain   []  Patient limited by other medical complications  []  Other:     Assessment: Patient progressing towards goals, continued with periscapular strengthening    Areas for Improvement: impaired ROM, impaired strength and pain  Prognosis: good    GOALS:  Patient Goal: to decrease pain in right shoulder, be able to  her kids    Short Term Goals:  Time Frame: by 4 weeks  1. Pt. Will demo independence with HEP for right shoulder stretching, ROM and eventual strengthening for decreased pain - GOAL MET 5/19/22 WITH INDEPENDENCE WITH STRETCHING PROGRAM   Revised Goal: Pt. Will demo independence with strengthening HEP for RUE for improved endurance strength     2. Pt. Will james improved AROM right shoulder to flexion= 145, abd= 125, and ER= 70 for ease with washing and styling her hair - GOAL MET 5/19/22 WITH ACTIVE RIGHT SHOULDER FLEXION= 170, ABD= 145 AND ER= 80  Revised Goal: Pt. Will james improved Abduction right shoulder to 160 for ease with washing hair    3. Pt. Will report decreased pain in right shoulder to no greater than 3/10 at rest for improved sleep - GOAL MET 5/19/22 WITH PT. REPORTING ONLY 1/10 PAIN IN RIGHT SHOULDER WITH CERTAIN MOVEMENTS      Long Term Goals:  Time Frame: by 8 weeks  1. Pt. Will james improved strength right shoulder to be able to lift her kids with minimal to no discomfort in right shoulder - GOAL MET 5/19/22 WITH PT. REPORTING ONLY MINIMAL DISCOMFORT WHEN PICKING UP HER CHILDREN    2. Pt. Will be able to complete household chores such as laundry (carry laundry basket, get clothes out of dryer) and push vacuum with minimal to no pain in right shoulder GOAL MET 5/19/22 WITH PT. REPORTING MINIMAL DISCOMFORT WITH CLEANING TASKS HOWEVER FEELS THAT RIGHT ARM GETS TIRED QUICKLY. Revised Goal: Pt.  Will kevino improved strength and endurance in right shoulder with report of no tiredness in RUE with household chores or cleaning. Patient Education:   []  HEP/Education Completed: Plan of Care, Goals, purpose and care of kinesiotape, availability of dry needling   Medbridge for HEP: sleeper stretch, upper trap stretch combined with IR stretch with towel, wall slides, corner stretch, scap pinches   Added tre and SA isometric strengthening exs. today  [x]  No new Education completed  []  Reviewed todays measurements and discussed goals and POC     []  Patient verbalized and/or demonstrated understanding of education provided. []  Patient unable to verbalize and/or demonstrate understanding of education provided. Will continue education. [x]  Barriers to learning: none    PLAN:  Treatment Recommendations: Strengthening, Range of Motion, Manual Therapy - Soft Tissue Mobilization, Home Exercise Program, Modalities and dry needling     []  Plan of care initiated. Plan to see patient 2 times per week for 8 weeks to address the treatment planned outlined above.   [x]  Continue with current plan of care  []  Modify plan of care as follows:    []  Hold pending physician visit  []  Discharge    Time In 0830   Time Out 0920   Timed Code Minutes: 50 min   Total Treatment Time: 50 min       Electronically Signed by:  GEE Armstrong/TEO, [address]

## 2022-06-03 ENCOUNTER — HOSPITAL ENCOUNTER (OUTPATIENT)
Dept: OCCUPATIONAL THERAPY | Age: 32
Setting detail: THERAPIES SERIES
Discharge: HOME | End: 2022-06-03
Payer: COMMERCIAL

## 2022-06-03 PROCEDURE — 97110 THERAPEUTIC EXERCISES: CPT

## 2022-06-03 PROCEDURE — 97140 MANUAL THERAPY 1/> REGIONS: CPT

## 2022-06-03 RX ORDER — GALCANEZUMAB 120 MG/ML
120 INJECTION, SOLUTION SUBCUTANEOUS
Qty: 1 PEN | Refills: 3 | Status: SHIPPED | OUTPATIENT
Start: 2022-06-03 | End: 2022-08-25 | Stop reason: SINTOL

## 2022-06-03 NOTE — PROGRESS NOTES
[address]  [] EVALUATION  [x] DAILY NOTE (LAND) [] DAILY NOTE (AQUATIC ) [] PROGRESS NOTE [] DISCHARGE NOTE    [x] OUTPATIENT REHABILITATION CENTER [facility]   [] [place] [address]    [] [facility]   [] Ade Franco    Date: 6/3/2022  Patient Name:  Bonita Srivastava  : 1990  MRN: [id number]  CSN: [id number]    Referring Practitioner MELANY Ewing -*   Diagnosis Pain in right shoulder [M25.511]    Treatment Diagnosis Pain, limited motion, decreased strength right shoulder   Date of Evaluation 22      Functional Outcome Measure Used UEFS   Functional Outcome Score 45/80 (22)       Insurance: Primary: Payor: UMR /  /  / ,   Secondary:    Authorization Information: No precert needed, 60 vs. Combined OT/PT/ST per calendar year   Visit # 13, 4/10 for PN (progress note 22)   Visits Allowed: 20   Recertification Date: [place] 15, 2022   Physician Follow-Up: May 12, 2022   Physician Orders: Madhu Ramires and treat   Pertinent History: Pt. States her right shoulder and neck started hurting in January this year, not sure why. She went to the chiropractor for 4 weeks and had manipulations done and tens unit. After 4 weeks, pt. Was discharged from chiropractor but continued to have shoulder pain. So pt. Went to [facility] who have pt. Injections at her trigger points along upper trap and base of skull. Pt. States these helped for about 1 day but then the pain came back. Pt. Now referred to OT for possible shoulder RC issue. Pt. Did have xrays 22 which were negative. Pt. Has been prescribed muscle relaxer as well but states she has not noticed a difference with this. SUBJECTIVE:  Patient states \"I would do dry needling every day if I could. \"  Reports it has really helped.     OBJECTIVE:    TREATMENT   Precautions: General    Pain: 310 right shoulder upper trap and medial scap border      X in shaded column indicates Activity Completed Today   Modalities Parameters/  Location  Notes/Comments   Ultrasound right upper trap down slightly onto medial border of scapula, 100%, 1 MHz, 1 arreguin/cm2   Pt reports pain relief for about a day after US               Manual Therapy Time/  Technique  Notes/Comments   STM to L upper trap, supraspinatus, medial border of scapula  X Tight/knotted tissue palpated, softening noted but still tight after IASTM   Scapular mobilizations            Exercises   Sets/  Sec Reps  Notes/Comments   sidelying sleeper stretch 15 sec 5     Supine cross body stretch for posterior capsule stretching 15 sec 5     Wall slides with UE's in neutral 5 sec 10     Upper trap stretch combined with  Towel stretch IR  15 sec 3  Upper trap and levator scap stretch with active release/STM   scap pinches 1 10     Corner stretch hands at 90 degrees ABD 15 sec 5 X    pec minor stretch while on towel roll with overpressure from therapist 15 sec. x3 2 min. total      SA punch using band 1 10  1# weight, supine   Ball on the wall 1 10 ea X 3 sets   sidelying abduction  1 10  1#   Sidelying ER 1 10  1# cues for scapular position, mild \"icy hot\" feeling in anterior shoulder after this   [place] and rows orange band 1 15 X    Supine Riivald protocol 1 15     biodex 90 speed 3 min bwd       Tricep/row/bicep 1 15 ea X Green band          Activities Time    Notes/Comments   Kinesiotape to right shoulder in Y strip to upper trap for inhibition and 2 I strips anterior to posterior for mechanical correction   Still having irritation of skin on 4/29. Will hold kinesiotaping for now.                  Dry needling manual therapy: consisted on the placement of 60 mm needles in the following muscles:  L upper trap, supraspinatus, infraspinatus, pec minor, subscapularis.  A 60 mm needle was inserted, piston, rotated, and coned to produce intramuscular mobilization.  These techniques were used to restore functional range of motion, reduce muscle spasm and induce healing in the corresponding musculature. (27053)  Clean Technique was utilized today while applying Dry needling treatment.  The treatment sites where cleaned with 70% solution of  isopropyl alcohol .  The OT washed their hands and utilized treatment gloves along with hand  prior to inserting the needles.  All needles where removed and discarded in the appropriate sharps container. MD has given verbal and/or written approval for this treatment.     Specific Interventions Next Treatment: Ultrasound to right shoulder top of [facility] joint, dry needling to trigger points right shoulder, stretching, ROM, trial kinesiotaping, RC strengthening    Activity/Treatment Tolerance:  [x]  Patient tolerated treatment well  []  Patient limited by fatigue  []  Patient limited by pain   []  Patient limited by other medical complications  []  Other:     Assessment: Patient progressing towards goals, continued with periscapular strengthening    Areas for Improvement: impaired ROM, impaired strength and pain  Prognosis: good    GOALS:  Patient Goal: to decrease pain in right shoulder, be able to  her kids    Short Term Goals:  Time Frame: by 4 weeks  1. Pt. Will demo independence with HEP for right shoulder stretching, ROM and eventual strengthening for decreased pain - GOAL MET 5/19/22 WITH INDEPENDENCE WITH STRETCHING PROGRAM   Revised Goal: Pt. Will demo independence with strengthening HEP for RUE for improved endurance strength     2. Pt. Will demo improved AROM right shoulder to flexion= 145, abd= 125, and ER= 70 for ease with washing and styling her hair - GOAL MET 5/19/22 WITH ACTIVE RIGHT SHOULDER FLEXION= 170, ABD= 145 AND ER= 80  Revised Goal: Pt. Will demo improved Abduction right shoulder to 160 for ease with washing hair    3. Pt.  Will report decreased pain in right shoulder to no greater than 3/10 at rest for improved sleep - GOAL MET 5/19/22 WITH PT. REPORTING ONLY 1/10 PAIN IN RIGHT SHOULDER WITH CERTAIN MOVEMENTS      Long Term Goals:  Time Frame: by 8 weeks  1. Pt. Will james improved strength right shoulder to be able to lift her kids with minimal to no discomfort in right shoulder - GOAL MET 5/19/22 WITH PT. REPORTING ONLY MINIMAL DISCOMFORT WHEN PICKING UP HER CHILDREN    2. Pt. Will be able to complete household chores such as laundry (carry laundry basket, get clothes out of dryer) and push vacuum with minimal to no pain in right shoulder GOAL MET 5/19/22 WITH PT. REPORTING MINIMAL DISCOMFORT WITH CLEANING TASKS HOWEVER FEELS THAT RIGHT ARM GETS TIRED QUICKLY. Revised Goal: Pt. Will james improved strength and endurance in right shoulder with report of no tiredness in RUE with household chores or cleaning. Patient Education:   []  HEP/Education Completed: Plan of Care, Goals, purpose and care of kinesiotape, availability of dry needling   Medbridge for HEP: sleeper stretch, upper trap stretch combined with IR stretch with towel, wall slides, corner stretch, scap pinches   Added tre and SA isometric strengthening exs. today  [x]  No new Education completed  []  Reviewed todays measurements and discussed goals and POC     []  Patient verbalized and/or demonstrated understanding of education provided. []  Patient unable to verbalize and/or demonstrate understanding of education provided. Will continue education. [x]  Barriers to learning: none    PLAN:  Treatment Recommendations: Strengthening, Range of Motion, Manual Therapy - Soft Tissue Mobilization, Home Exercise Program, Modalities and dry needling     []  Plan of care initiated. Plan to see patient 2 times per week for 8 weeks to address the treatment planned outlined above.   [x]  Continue with current plan of care  []  Modify plan of care as follows:    []  Hold pending physician visit  []  Discharge    Time In 1255   Time Out 1340   Timed Code Minutes: 45 min   Total Treatment Time: 45 min       Electronically Signed by:  Danita Foley OTR/L, [facility] [id number]

## 2022-06-07 ENCOUNTER — HOSPITAL ENCOUNTER (OUTPATIENT)
Dept: OCCUPATIONAL THERAPY | Age: 32
Setting detail: THERAPIES SERIES
Discharge: HOME | End: 2022-06-07
Payer: COMMERCIAL

## 2022-06-07 PROCEDURE — 97035 APP MDLTY 1+ULTRASOUND EA 15: CPT

## 2022-06-07 PROCEDURE — 97140 MANUAL THERAPY 1/> REGIONS: CPT

## 2022-06-07 PROCEDURE — 97110 THERAPEUTIC EXERCISES: CPT

## 2022-06-07 NOTE — PROGRESS NOTES
[address]  [] EVALUATION  [x] DAILY NOTE (LAND) [] DAILY NOTE (AQUATIC ) [] PROGRESS NOTE [] DISCHARGE NOTE    [x] OUTPATIENT REHABILITATION CENTER [facility]   [] [place] [address]    [] [facility]   [] Omayra Lights    Date: 2022  Patient Name:  Ruel Porter  : 1990  MRN: [id number]  CSN: [id number]    Referring Practitioner MELANY Hansen -*   Diagnosis Pain in right shoulder [M25.511]    Treatment Diagnosis Pain, limited motion, decreased strength right shoulder   Date of Evaluation 22      Functional Outcome Measure Used UEFS   Functional Outcome Score 45/80 (22)       Insurance: Primary: Payor: UMR /  /  / ,   Secondary:    Authorization Information: No precert needed, 60 vs. Combined OT/PT/ST per calendar year   Visit # 14, 5/10 for PN (progress note 22)   Visits Allowed: 20   Recertification Date: [place] 15, 2022   Physician Follow-Up: May 12, 2022   Physician Orders: Lurlene Najjar and treat   Pertinent History: Pt. States her right shoulder and neck started hurting in January this year, not sure why. She went to the chiropractor for 4 weeks and had manipulations done and tens unit. After 4 weeks, pt. Was discharged from chiropractor but continued to have shoulder pain. So pt. Went to [facility] who have pt. Injections at her trigger points along upper trap and base of skull. Pt. States these helped for about 1 day but then the pain came back. Pt. Now referred to OT for possible shoulder RC issue. Pt. Did have xrays 22 which were negative. Pt. Has been prescribed muscle relaxer as well but states she has not noticed a difference with this. SUBJECTIVE:  Patient reports continued \"soreness\" throughout shoulder. Patient reports she can sweep for longer duration without multiple rest breaks, along with noting she can  her laundry baskets with B hands and decreased pain.      OBJECTIVE:    TREATMENT Precautions: General    Pain: 3/10 right shoulder upper trap and medial scap border      X in shaded column indicates Activity Completed Today   Modalities Parameters/  Location  Notes/Comments   Ultrasound right upper trap down slightly onto medial border of scapula, 100%, 1 MHz, 1 arreguin/cm2  x                Manual Therapy Time/  Technique  Notes/Comments   STM to L upper trap, supraspinatus, medial border of scapula  X Tight/knotted tissue palpated, softening noted but still tight after IASTM   Scapular mobilizations            Exercises   Sets/  Sec Reps  Notes/Comments   sidelying sleeper stretch 15 sec 5     Supine cross body stretch for posterior capsule stretching 15 sec 5     Wall slides with UE's in neutral 5 sec 10     Upper trap stretch combined with  Towel stretch IR  15 sec 3 X Upper trap and levator scap stretch with active release/STM   scap pinches 1 10     Corner stretch hands at 90 degrees ABD 15 sec 5 X    pec minor stretch while on towel roll with overpressure from therapist 15 sec. x3 2 min. total      SA punch using band 1 10  1# weight, supine   Ball on the wall 3 10 ea X    sidelying abduction  1 10  1#   Sidelying ER 1 10  1# cues for scapular position, mild \"icy hot\" feeling in anterior shoulder after this   [place] and rows orange band 1 15 X    Supine Riivald protocol 1 15     Standing horizontal abduction  1 10 X Orange band. Trial this date for progression from supine to standing. Good tolerance, fatigue noted    biodex 90 speed 3 min bwd   X    Tricep/row/bicep 1 15 ea X Green band          Activities Time    Notes/Comments   Kinesiotape to right shoulder in Y strip to upper trap for inhibition and 2 I strips anterior to posterior for mechanical correction   Still having irritation of skin on 4/29. Will hold kinesiotaping for now.                  Specific Interventions Next Treatment: Ultrasound to right shoulder top of [address] [facility] joint, dry needling to trigger points right shoulder, stretching, ROM, trial kinesiotaping, RC strengthening    Activity/Treatment Tolerance:  [x]  Patient tolerated treatment well  []  Patient limited by fatigue  []  Patient limited by pain   []  Patient limited by other medical complications  []  Other:     Assessment: Patient progressing towards goals, continued with periscapular strengthening with good tolerance, fatigue noted but no increase in pain. Dry needling session next session with patient looking forward to. Areas for Improvement: impaired ROM, impaired strength and pain  Prognosis: good    GOALS:  Patient Goal: to decrease pain in right shoulder, be able to  her kids    Short Term Goals:  Time Frame: by 4 weeks  1. Pt. Will demo independence with HEP for right shoulder stretching, ROM and eventual strengthening for decreased pain - GOAL MET 5/19/22 WITH INDEPENDENCE WITH STRETCHING PROGRAM   Revised Goal: Pt. Will demo independence with strengthening HEP for RUE for improved endurance strength     2. Pt. Will james improved AROM right shoulder to flexion= 145, abd= 125, and ER= 70 for ease with washing and styling her hair - GOAL MET 5/19/22 WITH ACTIVE RIGHT SHOULDER FLEXION= 170, ABD= 145 AND ER= 80  Revised Goal: Pt. Will demo improved Abduction right shoulder to 160 for ease with washing hair    3. Pt. Will report decreased pain in right shoulder to no greater than 3/10 at rest for improved sleep - GOAL MET 5/19/22 WITH PT. REPORTING ONLY 1/10 PAIN IN RIGHT SHOULDER WITH CERTAIN MOVEMENTS      Long Term Goals:  Time Frame: by 8 weeks  1. Pt. Will demo improved strength right shoulder to be able to lift her kids with minimal to no discomfort in right shoulder - GOAL MET 5/19/22 WITH PT. REPORTING ONLY MINIMAL DISCOMFORT WHEN PICKING UP HER CHILDREN    2.  Pt. Will be able to complete household chores such as laundry (carry laundry basket, get clothes out of dryer) and push vacuum with minimal to no pain in right shoulder GOAL MET 5/19/22 WITH PT. REPORTING MINIMAL DISCOMFORT WITH CLEANING TASKS HOWEVER FEELS THAT RIGHT ARM GETS TIRED QUICKLY. Revised Goal: Pt. Will demo improved strength and endurance in right shoulder with report of no tiredness in RUE with household chores or cleaning. Patient Education:   []  HEP/Education Completed: Plan of Care, Goals, purpose and care of kinesiotape, availability of dry needling   Medbridge for HEP: sleeper stretch, upper trap stretch combined with IR stretch with towel, wall slides, corner stretch, scap pinches   Added tre and SA isometric strengthening exs. today  []  No new Education completed  [x]  Reviewed HEP      []  Patient verbalized and/or demonstrated understanding of education provided. []  Patient unable to verbalize and/or demonstrate understanding of education provided. Will continue education. [x]  Barriers to learning: none    PLAN:  Treatment Recommendations: Strengthening, Range of Motion, Manual Therapy - Soft Tissue Mobilization, Home Exercise Program, Modalities and dry needling     []  Plan of care initiated. Plan to see patient 2 times per week for 8 weeks to address the treatment planned outlined above. [x]  Continue with current plan of care  []  Modify plan of care as follows:    []  Hold pending physician visit  []  Discharge    Time In 1545   Time Out 1627   Timed Code Minutes: 42 min   Total Treatment Time: 42 min       Electronically Signed by:   Marta ALLRED/TEO #[id number]

## 2022-06-08 ENCOUNTER — HOSPITAL ENCOUNTER (OUTPATIENT)
Dept: OCCUPATIONAL THERAPY | Age: 32
Setting detail: THERAPIES SERIES
Discharge: HOME | End: 2022-06-08
Payer: COMMERCIAL

## 2022-06-08 PROCEDURE — 97140 MANUAL THERAPY 1/> REGIONS: CPT

## 2022-06-08 PROCEDURE — 97110 THERAPEUTIC EXERCISES: CPT

## 2022-06-08 NOTE — PROGRESS NOTES
[address]  [] EVALUATION  [x] DAILY NOTE (LAND) [] DAILY NOTE (AQUATIC ) [] PROGRESS NOTE [] DISCHARGE NOTE    [x] OUTPATIENT REHABILITATION CENTER [facility]   [] [place] [address]    [] [facility]   [] [facility]    Date: 2022  Patient Name:  Oscar Hahn  : 1990  MRN: [id number]  CSN: [id number]    Referring Practitioner MELANY Batista -*   Diagnosis Pain in right shoulder [M25.511]    Treatment Diagnosis Pain, limited motion, decreased strength right shoulder   Date of Evaluation 22      Functional Outcome Measure Used UEFS   Functional Outcome Score 45/80 (22)       Insurance: Primary: Payor: UMR /  /  / ,   Secondary:    Authorization Information: No precert needed, 60 vs. Combined OT/PT/ST per calendar year   Visit # 15, 6/10 for PN (progress note 22)   Visits Allowed: 20   Recertification Date: [place] 15, 2022   Physician Follow-Up: May 12, 2022   Physician Orders: Amarilis Eckert and treat   Pertinent History: Pt. States her right shoulder and neck started hurting in January this year, not sure why. She went to the chiropractor for 4 weeks and had manipulations done and tens unit. After 4 weeks, pt. Was discharged from chiropractor but continued to have shoulder pain. So pt. Went to [facility] who have pt. Injections at her trigger points along upper trap and base of skull. Pt. States these helped for about 1 day but then the pain came back. Pt. Now referred to OT for possible shoulder RC issue. Pt. Did have xrays 22 which were negative. Pt. Has been prescribed muscle relaxer as well but states she has not noticed a difference with this. SUBJECTIVE:  Patient reports a little soreness but demonstrates full ROM with a little catch with abduction at midrange at start of session.     OBJECTIVE:    TREATMENT   Precautions: General    Pain: 3/10 right shoulder upper trap and medial scap border      X in shaded column indicates Activity Completed Today   Modalities Parameters/  Location  Notes/Comments   Ultrasound right upper trap down slightly onto medial border of scapula, 100%, 1 MHz, 1 arreguin/cm2                  Manual Therapy Time/  Technique  Notes/Comments   STM to L upper trap, supraspinatus, medial border of scapula   Tight/knotted tissue palpated, softening noted but still tight after IASTM   Scapular mobilizations            Exercises   Sets/  Sec Reps  Notes/Comments   sidelying sleeper stretch 15 sec 5     Supine cross body stretch for posterior capsule stretching 15 sec 5     Wall slides with UE's in neutral 5 sec 10     Upper trap stretch combined with  Towel stretch IR  15 sec 3  Upper trap and levator scap stretch with active release/STM   scap pinches 1 10     Corner stretch hands at 90 degrees ABD 15 sec 5     pec minor stretch while on towel roll with overpressure from therapist 15 sec. x3 2 min. total      SA punch using band 1 10  1# weight, supine   Ball on the wall x 4 directions 1 10 ea X    sidelying abduction  1 10  1#   Sidelying ER 1 10  1# cues for scapular position, mild \"icy hot\" feeling in anterior shoulder after this   Lr  orange band 1 15 ea X    Supine Riivald protocol 1 15     Standing horizontal abduction  1 10  [place] band. Trial this date for progression from supine to standing. Good tolerance, fatigue noted    biodex 90 speed 3 min bwd       Tricep/row/bicep 1 15 ea X Green band          Activities Time    Notes/Comments   Kinesiotape to right shoulder in Y strip to upper trap for inhibition and 2 I strips anterior to posterior for mechanical correction   Still having irritation of skin on 4/29. Will hold kinesiotaping for now.                  Dry needling manual therapy: consisted on the placement of 60 mm needles in the following muscles:  L upper trap, supraspinatus, infraspinatus, pec minor, subscapularis.  A 60 mm needle was inserted, piston, rotated, and coned to produce intramuscular mobilization.  These techniques were used to restore functional range of motion, reduce muscle spasm and induce healing in the corresponding musculature. (12489)  Clean Technique was utilized today while applying Dry needling treatment.  The treatment sites where cleaned with 70% solution of  isopropyl alcohol .  The OT washed their hands and utilized treatment gloves along with hand  prior to inserting the needles.  All needles where removed and discarded in the appropriate sharps container. MD has given verbal and/or written approval for this treatment.     Specific Interventions Next Treatment: Ultrasound to right shoulder top of [address] joint, dry needling to trigger points right shoulder, stretching, ROM, trial kinesiotaping, RC strengthening    Activity/Treatment Tolerance:  [x]  Patient tolerated treatment well  []  Patient limited by fatigue  []  Patient limited by pain   []  Patient limited by other medical complications  []  Other:     Assessment: Patient progressing towards goals, continued with periscapular strengthening with good tolerance, fatigue noted but no increase in pain. Dry needling session next session with patient looking forward to. Areas for Improvement: impaired ROM, impaired strength and pain  Prognosis: good    GOALS:  Patient Goal: to decrease pain in right shoulder, be able to  her kids    Short Term Goals:  Time Frame: by 4 weeks  1. Pt. Will demo independence with HEP for right shoulder stretching, ROM and eventual strengthening for decreased pain - GOAL MET 5/19/22 WITH INDEPENDENCE WITH STRETCHING PROGRAM   Revised Goal: Pt. Will demo independence with strengthening HEP for RUE for improved endurance strength     2. Pt. Will james improved AROM right shoulder to flexion= 145, abd= 125, and ER= 70 for ease with washing and styling her hair - GOAL MET 5/19/22 WITH ACTIVE RIGHT SHOULDER FLEXION= 170, ABD= 145 AND ER= 80  Revised Goal: Pt.  Will james improved Abduction right shoulder to 160 for ease with washing hair    3. Pt. Will report decreased pain in right shoulder to no greater than 3/10 at rest for improved sleep - GOAL MET 5/19/22 WITH PT. REPORTING ONLY 1/10 PAIN IN RIGHT SHOULDER WITH CERTAIN MOVEMENTS      Long Term Goals:  Time Frame: by 8 weeks  1. Pt. Will demo improved strength right shoulder to be able to lift her kids with minimal to no discomfort in right shoulder - GOAL MET 5/19/22 WITH PT. REPORTING ONLY MINIMAL DISCOMFORT WHEN PICKING UP HER CHILDREN    2. Pt. Will be able to complete household chores such as laundry (carry laundry basket, get clothes out of dryer) and push vacuum with minimal to no pain in right shoulder GOAL MET 5/19/22 WITH PT. REPORTING MINIMAL DISCOMFORT WITH CLEANING TASKS HOWEVER FEELS THAT RIGHT ARM GETS TIRED QUICKLY. Revised Goal: Pt. Will demo improved strength and endurance in right shoulder with report of no tiredness in RUE with household chores or cleaning. Patient Education:   []  HEP/Education Completed: Plan of Care, Goals, purpose and care of kinesiotape, availability of dry needling   Medbridge for HEP: sleeper stretch, upper trap stretch combined with IR stretch with towel, wall slides, corner stretch, scap pinches   Added tre and SA isometric strengthening exs. today  []  No new Education completed  [x]  Reviewed HEP      []  Patient verbalized and/or demonstrated understanding of education provided. []  Patient unable to verbalize and/or demonstrate understanding of education provided. Will continue education. [x]  Barriers to learning: none    PLAN:  Treatment Recommendations: Strengthening, Range of Motion, Manual Therapy - Soft Tissue Mobilization, Home Exercise Program, Modalities and dry needling     []  Plan of care initiated. Plan to see patient 2 times per week for 8 weeks to address the treatment planned outlined above.   [x]  Continue with current plan of care  []  Modify plan of care as follows:    []  Hold pending physician visit  []  Discharge    Time In 0845   Time Out 0925   Timed Code Minutes: 40 min   Total Treatment Time: 40 min       Electronically Signed by:  Nora MONTOYA/TEO, T #[id number]

## 2022-06-09 ENCOUNTER — APPOINTMENT (OUTPATIENT)
Dept: OCCUPATIONAL THERAPY | Age: 32
End: 2022-06-09
Payer: COMMERCIAL

## 2022-06-14 ENCOUNTER — HOSPITAL ENCOUNTER (OUTPATIENT)
Dept: OCCUPATIONAL THERAPY | Age: 32
Setting detail: THERAPIES SERIES
Discharge: HOME | End: 2022-06-14
Payer: COMMERCIAL

## 2022-06-14 PROCEDURE — 97110 THERAPEUTIC EXERCISES: CPT

## 2022-06-14 PROCEDURE — 97035 APP MDLTY 1+ULTRASOUND EA 15: CPT

## 2022-06-14 NOTE — PROGRESS NOTES
[address]  [] EVALUATION  [] DAILY NOTE (LAND) [] DAILY NOTE (AQUATIC ) [x] PROGRESS NOTE [] DISCHARGE NOTE    [x] OUTPATIENT REHABILITATION CENTER [facility]   [] [place] [address]    [] [facility]   [] [facility]    Date: 2022  Patient Name:  Marilin Dasilva  : 1990  MRN: [id number]  CSN: [id number]    Referring Practitioner MELANY Campa -*   Diagnosis Pain in right shoulder [M25.511]    Treatment Diagnosis Pain, limited motion, decreased strength right shoulder   Date of Evaluation 22      Functional Outcome Measure Used UEFS   Functional Outcome Score 45/80 (22)       Insurance: Primary: Payor: UMR /  /  / ,   Secondary:    Authorization Information: No precert needed, 60 vs. Combined OT/PT/ST per calendar year   Visit # 16, 7/10 for PN (progress note 22)   Visits Allowed: 20   Recertification Date: 2022   Physician Follow-Up: May 12, 2022   Physician Orders: Narayan Nagel and treat   Pertinent History: Pt. States her right shoulder and neck started hurting in January this year, not sure why. She went to the chiropractor for 4 weeks and had manipulations done and tens unit. After 4 weeks, pt. Was discharged from chiropractor but continued to have shoulder pain. So pt. Went to [facility] who have pt. Injections at her trigger points along upper trap and base of skull. Pt. States these helped for about 1 day but then the pain came back. Pt. Now referred to OT for possible shoulder RC issue. Pt. Did have xrays 22 which were negative. Pt. Has been prescribed muscle relaxer as well but states she has not noticed a difference with this. SUBJECTIVE:  Patient states she was able to to vacuum the entire house without having to switch to LUE to push the vacuum.     OBJECTIVE:    TREATMENT   Precautions: General    Pain: 2/10 right shoulder upper trap and medial scap border      X in shaded column indicates Activity Completed Today   Modalities Parameters/  Location  Notes/Comments   Ultrasound right upper trap down slightly onto medial border of scapula, 100%, 1 MHz, 1 arreguin/cm2  X                Manual Therapy Time/  Technique  Notes/Comments   IASTM to L upper trap, supraspinatus, medial border of scapula  X Tight/knotted tissue palpated, softening noted after IASTM   Scapular mobilizations            Exercises   Sets/  Sec Reps  Notes/Comments   sidelying sleeper stretch 15 sec 5     Supine cross body stretch for posterior capsule stretching 15 sec 5     Wall slides with UE's in neutral 5 sec 10     Upper trap stretch 15 sec 3 X    scap pinches 1 10     Corner stretch hands at 90 degrees ABD 15 sec 5     pec minor stretch while on towel roll with overpressure from therapist 15 sec. x3 2 min. total      SA punch using green band 1 15 X In standing   Ball on the wall x 4 directions 1 10 ea X    sidelying abduction  1 10  1#   Sidelying ER 1 10  1# cues for scapular position, mild \"icy hot\" feeling in anterior shoulder after this   [place]  green band 1 10 ea X    Supine Riivald protocol 1 15     Standing horizontal abduction  1 10  [place] band. Trial this date for progression from supine to standing. Good tolerance, fatigue noted    biodex 90 speed 3 min bwd       Tricep/row/bicep 1 15 ea X Green band          Activities Time    Notes/Comments   Kinesiotape to right shoulder in Y strip to upper trap for inhibition and 2 I strips anterior to posterior for mechanical correction   Still having irritation of skin on 4/29. Will hold kinesiotaping for now.                    Specific Interventions Next Treatment: Ultrasound to right shoulder top of [address] [facility] [address] [place] joint, dry needling to trigger points right shoulder, stretching, ROM, trial kinesiotaping, RC strengthening    Activity/Treatment Tolerance:  [x]  Patient tolerated treatment well  []  Patient limited by fatigue  []  Patient limited by pain   []  Patient limited by other medical complications  []  Other:     Assessment: Patient has been seen a total of 16 visits in OT for right shoulder pain/upper trap. Treatment has included ultrasound, IASTM, dry needling, stretching, HEP, and periscapular strengthening. Pt. Reports independence with HEP for stretching and theraband. Pt. Reports significant decrease in pain in right shoulder from evaluation date and was able to use RUE to push the vacuum to complete all rooms without having to switch to left arm. Pt. [place]'s functional AROM of right shoulder in all directions. Pt. Does still report that her right shoulder and upper trap become fatigued by end of resistive tasks and shoulder will again have dull ache. Recommend continued therapy 1x/week x4 weeks for strengthening and endurance of right shoulder and continued modalities for discomfort    Areas for Improvement: impaired ROM, impaired strength and pain  Prognosis: good    GOALS:  Patient Goal: to decrease pain in right shoulder, be able to  her kids    Short Term Goals:  Time Frame: by 4 weeks  1. Pt. Will demo independence with HEP for right shoulder stretching, ROM and eventual strengthening for decreased pain - GOAL MET 5/19/22 WITH INDEPENDENCE WITH STRETCHING PROGRAM   Revised Goal: Pt. Will demo independence with strengthening HEP for RUE for improved endurance strength - GOAL MET 6/14/22 WITH PT. [address] WITH ORANGE THERABAND   Continue with upgrades to strengthening program    2. Pt. Will indio improved AROM right shoulder to flexion= 145, abd= 125, and ER= 70 for ease with washing and styling her hair - Revised Goal: Pt. Will indio improved Abduction right shoulder to 160 for ease with washing hair - GOAL MET 6/14/22 WITH ACTIVE ABD RIGHT SHOULDER= 168 - REPORTS ABILITY TO WASH AND STYLE HAIR    3. Pt.  Will report decreased pain in right shoulder to no greater than 3/10 at rest for improved sleep - GOAL MET 5/19/22 WITH PT. REPORTING ONLY 1/10 PAIN IN RIGHT SHOULDER WITH CERTAIN MOVEMENTS  7/92/36 - PT. REPORTING CONTINUED 1/10 IN RIGHT UPPER TRAP      Long Term Goals:  Time Frame: by 8 weeks  1. Pt. Will james improved strength right shoulder to be able to lift her kids with minimal to no discomfort in right shoulder - GOAL MET 5/19/22 WITH PT. REPORTING ONLY MINIMAL DISCOMFORT WHEN PICKING UP HER CHILDREN    2. Pt. Will be able to complete household chores such as laundry (carry laundry basket, get clothes out of dryer) and push vacuum with minimal to no pain in right shoulder   Revised Goal: Pt. Will james improved strength and endurance in right shoulder with report of no tiredness in RUE with household chores or cleaning. - GOAL NOT MET BUT IMPROVING 6/14/22 - STATES SHE WAS ABLE TO VACUUM WITH RUE TODAY BUT ARM SHOULDER WAS VERY FATIGUED AT END OF TASK- continue goal    Patient Education:   []  HEP/Education Completed: Plan of Care, Goals, purpose and care of kinesiotape, availability of dry needling   Medbridge for HEP: sleeper stretch, upper trap stretch combined with IR stretch with towel, wall slides, corner stretch, scap pinches   Added tre and SA isometric strengthening exs. Today   6/14/22 gave green band for upgrade to tre exercises and added Riivalid exercises with orange band to home program  []  No new Education completed  [x]  Reviewed HEP - issued green band for upgrade to HEP     [x]  Patient verbalized and/or demonstrated understanding of education provided. []  Patient unable to verbalize and/or demonstrate understanding of education provided. Will continue education. [x]  Barriers to learning: none    PLAN:  Treatment Recommendations: Strengthening, Range of Motion, Manual Therapy - Soft Tissue Mobilization, Home Exercise Program, Modalities and dry needling     []  Plan of care initiated. Plan to see patient 2 times per week for 8 weeks to address the treatment planned outlined above.   []  Continue with current plan of care  [x]  Modify plan of care as follows: Recommend continuation 1x/week x4 weeks per plan above   []  Hold pending physician visit  []  Discharge    Time In 1330   Time Out 1410   Timed Code Minutes: 40 min   Total Treatment Time: 40 min       Electronically Signed by:  Mario Rivers OTR/L [id number]

## 2022-06-16 ENCOUNTER — APPOINTMENT (OUTPATIENT)
Dept: OCCUPATIONAL THERAPY | Age: 32
End: 2022-06-16
Payer: COMMERCIAL

## 2022-06-17 ENCOUNTER — APPOINTMENT (OUTPATIENT)
Dept: OCCUPATIONAL THERAPY | Age: 32
End: 2022-06-17
Payer: COMMERCIAL

## 2022-06-20 ENCOUNTER — OFFICE VISIT (OUTPATIENT)
Dept: FAMILY MEDICINE CLINIC | Age: 32
End: 2022-06-20
Payer: COMMERCIAL

## 2022-06-20 ENCOUNTER — HOSPITAL ENCOUNTER (OUTPATIENT)
Dept: OCCUPATIONAL THERAPY | Age: 32
Setting detail: THERAPIES SERIES
Discharge: HOME | End: 2022-06-20
Payer: COMMERCIAL

## 2022-06-20 VITALS
DIASTOLIC BLOOD PRESSURE: 68 MMHG | HEART RATE: 74 BPM | SYSTOLIC BLOOD PRESSURE: 105 MMHG | WEIGHT: 158 LBS | BODY MASS INDEX: 29.83 KG/M2 | OXYGEN SATURATION: 99 % | HEIGHT: 61 IN | TEMPERATURE: 97.5 F | RESPIRATION RATE: 16 BRPM

## 2022-06-20 DIAGNOSIS — H65.02 NON-RECURRENT ACUTE SEROUS OTITIS MEDIA OF LEFT EAR: Primary | ICD-10-CM

## 2022-06-20 PROCEDURE — 99213 OFFICE O/P EST LOW 20 MIN: CPT | Performed by: STUDENT IN AN ORGANIZED HEALTH CARE EDUCATION/TRAINING PROGRAM

## 2022-06-20 PROCEDURE — 97110 THERAPEUTIC EXERCISES: CPT

## 2022-06-20 PROCEDURE — 97140 MANUAL THERAPY 1/> REGIONS: CPT

## 2022-06-20 SDOH — ECONOMIC STABILITY: FOOD INSECURITY: WITHIN THE PAST 12 MONTHS, YOU WORRIED THAT YOUR FOOD WOULD RUN OUT BEFORE YOU GOT MONEY TO BUY MORE.: NEVER TRUE

## 2022-06-20 SDOH — ECONOMIC STABILITY: FOOD INSECURITY: WITHIN THE PAST 12 MONTHS, THE FOOD YOU BOUGHT JUST DIDN'T LAST AND YOU DIDN'T HAVE MONEY TO GET MORE.: NEVER TRUE

## 2022-06-20 ASSESSMENT — ENCOUNTER SYMPTOMS
BLOOD IN STOOL: 0
EYE PAIN: 0
ABDOMINAL PAIN: 0
TROUBLE SWALLOWING: 0
DIARRHEA: 0
NAUSEA: 0
VOMITING: 0
CONSTIPATION: 0
SHORTNESS OF BREATH: 0
COUGH: 0

## 2022-06-20 ASSESSMENT — SOCIAL DETERMINANTS OF HEALTH (SDOH): HOW HARD IS IT FOR YOU TO PAY FOR THE VERY BASICS LIKE FOOD, HOUSING, MEDICAL CARE, AND HEATING?: NOT HARD AT ALL
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
## 2022-06-20 NOTE — PROGRESS NOTES
[address] [facility]. [address] [address]  Dept: [phone]  Loc: [address] [facility] (:  1990) is a 28 y.o. female here for evaluation of the following chief complaint(s):  Otalgia      JR precepting note    ASSESSMENT/PLAN:  Ear fullness    Follow up with ENT next week as already scheduled. Continue claritin, flonase, and complete course of abx. SUBJECTIVE/OBJECTIVE:  HPI    Per Dr. Jessie Gonzáles note    Review of Systems per Dr. Jessie Gonzáles note    Physical Exam per Dr. Jessie Gonzáles note      Vitals:    22 1130   BP: 105/68   Site: Right Upper Arm   Position: Sitting   Cuff Size: Medium Adult   Pulse: 74   Resp: 16   Temp: 97.5 °F (36.4 °C)   SpO2: 99%   Weight: 158 lb (71.7 kg)   Height: 5' 1\" (1.549 m)         An electronic signature was used to authenticate this note.     --Fabi Christensen MD

## 2022-06-20 NOTE — PROGRESS NOTES
S: 28 y.o. female with   Chief Complaint   Patient presents with    Otalgia       HPI: please see resident note for HPI and ROS. BP Readings from Last 3 Encounters:   06/20/22 105/68   03/15/22 100/60   02/23/22 112/80     Wt Readings from Last 3 Encounters:   06/20/22 158 lb (71.7 kg)   03/15/22 156 lb 9.6 oz (71 kg)   02/23/22 152 lb 6.4 oz (69.1 kg)       O: VS:  height is 5' 1\" (1.549 m) and weight is 158 lb (71.7 kg). Her temperature is 97.5 °F (36.4 °C). Her blood pressure is 105/68 and her pulse is 74. Her respiration is 16 and oxygen saturation is 99%. Physical exam performed by resident physician. Diagnosis Orders   1. Non-recurrent acute serous otitis media of left ear         Plan:  Please refer to resident note for full plan. 80-year-old female presents to the office for concern of left ear pain/fullness/popping that started yesterday evening. Patient has a pertinent past medical history of recent sinus surgery possible turbinectomy approxi-1 month ago at [facility] through ENT. Has been on antibiotics since that time and is finishing her last dose of amoxicillin today. Patient went to come in since this sensation is not resolving. Per resident physician, physical exam not reveal any acute infection with some mild serous drainage on the left. Right ear within normal limits. Patient most likely has postsurgical inflammatory changes resulting in eustachian tube dysfunction and poor middle ear drainage. Patient was educated to continue Flonase 2 sprays each nostril twice a day, Claritin. Follow-up with ENT as scheduled next week. Health Maintenance Due   Topic Date Due    Cervical cancer screen  09/05/2020    COVID-19 Vaccine (3 - Booster for Pfizer series) 06/05/2022       Attending Physician Statement  I have discussed the case, including pertinent history and exam findings with the resident.   I agree with the documented assessment and plan as documented by the resident.   CHELSEY He DO 6/20/2022 3:49 PM

## 2022-06-20 NOTE — PATIENT INSTRUCTIONS
Patient Education        Eustachian Tube Problems: Care Instructions  Overview     The eustachian (say \"you-STAY-shee-un\") tubes run between the inside of the ears and the throat. They keep air pressure stable in the ears. If your eustachian tubes become blocked, the air pressure in your ears changes. The fluids from a cold can clog eustachian tubes, causing pain in the ears. A quick change in air pressure can cause eustachian tubes to close up. This might happen when an airplane changes altitude or when a  goes up or downunderwater. Eustachian tube problems often clear up on their own or after treating the cause of the blockage. If your tubes continue to be blocked, you may needsurgery. Follow-up care is a key part of your treatment and safety. Be sure to make and go to all appointments, and call your doctor if you are having problems. It's also a good idea to know your test results and keep alist of the medicines you take. How can you care for yourself at home?  Try a simple exercise to help open blocked tubes. Close your mouth, hold your nose, and gently blow as if you are blowing your nose. Yawning and chewing gum also may help. You may hear or feel a \"pop\" when the tubes open.  To ease ear pain, apply a warm washcloth or a heating pad set on low. There may be some drainage from the ear when the heat melts earwax. Put a cloth between the heat source and your skin.  If your doctor prescribed antibiotics, take them as directed. Do not stop taking them just because you feel better. You need to take the full course of antibiotics.  Be safe with medicines. Depending on the cause of the problem, your doctor may recommend over-the-counter medicine. For example, adults may try decongestants for cold symptoms or nasal spray steroids for allergies. Follow the instructions carefully.  Be careful with cough and cold medicines.  Don't give them to children younger than 6, because they don't work for

## 2022-06-20 NOTE — PROGRESS NOTES
[address]  [] EVALUATION  [x] DAILY NOTE (LAND) [] DAILY NOTE (AQUATIC ) [] PROGRESS NOTE [] DISCHARGE NOTE    [x] OUTPATIENT REHABILITATION CENTER [facility]   [] [place] [address]    [] [facility]   [] Mar     Date: 2022  Patient Name:  Brianna Leo  : 1990  MRN: [id number]  CSN: [id number]    Referring Practitioner MELANY Montero -*   Diagnosis Pain in right shoulder [M25.511]    Treatment Diagnosis Pain, limited motion, decreased strength right shoulder   Date of Evaluation 22      Functional Outcome Measure Used UEFS   Functional Outcome Score 45/80 (22)       Insurance: Primary: Payor: [employer] /  /  / ,   Secondary:    Authorization Information: No precert needed, 60 vs. Combined OT/PT/ST per calendar year   Visit # 17, 1/10 for PN (progress note 22)   Visits Allowed: 20   Recertification Date: 2022   Physician Follow-Up: May 12, 2022   Physician Orders: Moni Su and treat   Pertinent History: Pt. States her right shoulder and neck started hurting in January this year, not sure why. She went to the chiropractor for 4 weeks and had manipulations done and tens unit. After 4 weeks, pt. Was discharged from chiropractor but continued to have shoulder pain. So pt. Went to [facility] who have pt. Injections at her trigger points along upper trap and base of skull. Pt. States these helped for about 1 day but then the pain came back. Pt. Now referred to OT for possible shoulder RC issue. Pt. Did have xrays 22 which were negative. Pt. Has been prescribed muscle relaxer as well but states she has not noticed a difference with this. SUBJECTIVE:  Patient is happy with her progress and improving strength. Still having some upper trap discomfort.     OBJECTIVE:    TREATMENT   Precautions: General    Pain: 2/10 right shoulder upper trap and medial scap border      X in shaded column indicates Activity Completed Today   Modalities Parameters/  Location  Notes/Comments   Ultrasound right upper trap down slightly onto medial border of scapula, 100%, 1 MHz, 1 arreguin/cm2                  Manual Therapy Time/  Technique  Notes/Comments   STM to L upper trap, supraspinatus, medial border of scapula, pec/pec minor  X Tight/knotted tissue palpated, softening noted after IASTM   Scapular mobilizations            Exercises   Sets/  Sec Reps  Notes/Comments   sidelying sleeper stretch 15 sec 5     Supine cross body stretch for posterior capsule stretching 15 sec 5     Wall slides with UE's in neutral 5 sec 10     Upper trap stretch 15 sec 3     scap pinches 1 10     Corner stretch hands at 90 degrees ABD 15 sec 5     Supine angels on towel roll placed along spine, then chin tucks 1 10 ea X    SA punch using green band 1 15  In standing   Ball on the wall x 4 directions 1 10 ea X    sidelying abduction  1 10  1#   Sidelying ER 1 10  1# cues for scapular position, mild \"icy hot\" feeling in anterior shoulder after this   Rl  green band 1 15 ea X Focus on ER this date   Standing diagonals on wall closed chain 1 15 X Orange band   Standing horizontal abduction  1 10  Amasa band. Trial this date for progression from supine to standing. Good tolerance, fatigue noted    biodex 90 speed 3 min bwd       Tricep/row/bicep 1 15 ea  Green band          Activities Time    Notes/Comments   Kinesiotape to right shoulder in Y strip to upper trap for inhibition and 2 I strips anterior to posterior for mechanical correction   Still having irritation of skin on 4/29. Will hold kinesiotaping for now. Dry needling manual therapy: consisted on the placement of 60 mm needles in the following muscles:  R upper trap, pec. A 60 mm needle was inserted, piston, rotated, and coned to produce intramuscular mobilization.   These techniques were used to restore functional range of motion, reduce muscle spasm and induce healing in the corresponding musculature. (09408)  Clean Technique was utilized today while applying Dry needling treatment. The treatment sites where cleaned with 70% solution of  isopropyl alcohol . The PT washed their hands and utilized treatment gloves along with hand  prior to inserting the needles. All needles where removed and discarded in the appropriate sharps container. MD has given verbal and/or written approval for this treatment. Specific Interventions Next Treatment: Ultrasound to right shoulder top of [address] joint, dry needling to trigger points right shoulder, stretching, ROM, trial kinesiotaping, RC strengthening    Activity/Treatment Tolerance:  [x]  Patient tolerated treatment well  []  Patient limited by fatigue  []  Patient limited by pain   []  Patient limited by other medical complications  []  Other:     Assessment: Patient has been seen a total of 16 visits in OT for right shoulder pain/upper trap. Treatment has included ultrasound, IASTM, dry needling, stretching, HEP, and periscapular strengthening. Pt. Reports independence with HEP for stretching and theraband. Pt. Reports significant decrease in pain in right shoulder from evaluation date and was able to use RUE to push the vacuum to complete all rooms without having to switch to left arm. Pt. Demo's functional AROM of right shoulder in all directions. Pt. Does still report that her right shoulder and upper trap become fatigued by end of resistive tasks and shoulder will again have dull ache. Recommend continued therapy 1x/week x4 weeks for strengthening and endurance of right shoulder and continued modalities for discomfort    Areas for Improvement: impaired ROM, impaired strength and pain  Prognosis: good    GOALS:  Patient Goal: to decrease pain in right shoulder, be able to  her kids    Short Term Goals:  Time Frame: by 4 weeks  1. Pt.  Will demo independence with HEP for right shoulder stretching, ROM and eventual strengthening for decreased pain - GOAL MET 5/19/22 WITH INDEPENDENCE WITH STRETCHING PROGRAM   Revised Goal: Pt. Will james independence with strengthening HEP for RUE for improved endurance strength - GOAL MET 6/14/22 WITH PT. [address] WITH ORANGE THERABAND   Continue with upgrades to strengthening program    2. Pt. Will james improved AROM right shoulder to flexion= 145, abd= 125, and ER= 70 for ease with washing and styling her hair - Revised Goal: Pt. Will james improved Abduction right shoulder to 160 for ease with washing hair - GOAL MET 6/14/22 WITH ACTIVE ABD RIGHT SHOULDER= 168 - REPORTS ABILITY TO WASH AND STYLE HAIR    3. Pt. Will report decreased pain in right shoulder to no greater than 3/10 at rest for improved sleep - GOAL MET 5/19/22 WITH PT. REPORTING ONLY 1/10 PAIN IN RIGHT SHOULDER WITH CERTAIN MOVEMENTS  2/99/57 - PT. REPORTING CONTINUED 1/10 IN RIGHT UPPER TRAP      Long Term Goals:  Time Frame: by 8 weeks  1. Pt. Will james improved strength right shoulder to be able to lift her kids with minimal to no discomfort in right shoulder - GOAL MET 5/19/22 WITH PT. REPORTING ONLY MINIMAL DISCOMFORT WHEN PICKING UP HER CHILDREN    2. Pt. Will be able to complete household chores such as laundry (carry laundry basket, get clothes out of dryer) and push vacuum with minimal to no pain in right shoulder   Revised Goal: Pt.  Will james improved strength and endurance in right shoulder with report of no tiredness in RUE with household chores or cleaning. - GOAL NOT MET BUT IMPROVING 6/14/22 - STATES SHE WAS ABLE TO VACUUM WITH RUE TODAY BUT ARM SHOULDER WAS VERY FATIGUED AT END OF TASK- continue goal    Patient Education:   []  HEP/Education Completed: Plan of Care, Goals, purpose and care of kinesiotape, availability of dry needling   Medbridge for HEP: sleeper stretch, upper trap stretch combined with IR stretch with towel, wall slides, corner stretch, scap pinches   Added tre and SA isometric strengthening exs. Today   6/14/22 gave green band for upgrade to tre exercises and added Riivalid exercises with orange band to home program  []  No new Education completed  [x]  Reviewed HEP - issued green band for upgrade to HEP     [x]  Patient verbalized and/or demonstrated understanding of education provided. []  Patient unable to verbalize and/or demonstrate understanding of education provided. Will continue education. [x]  Barriers to learning: none    PLAN:  Treatment Recommendations: Strengthening, Range of Motion, Manual Therapy - Soft Tissue Mobilization, Home Exercise Program, Modalities and dry needling     []  Plan of care initiated. Plan to see patient 2 times per week for 8 weeks to address the treatment planned outlined above.   [x]  Continue with current plan of care  []  Modify plan of care as follows: Recommend continuation 1x/week x4 weeks per plan above   []  Hold pending physician visit  []  Discharge    Time In 0800   Time Out 0830   Timed Code Minutes: 30 min   Total Treatment Time: 30 min       Electronically Signed by:  Geoff MONTOYA/TEO, CHT #[id number]

## 2022-06-28 ENCOUNTER — HOSPITAL ENCOUNTER (OUTPATIENT)
Dept: OCCUPATIONAL THERAPY | Age: 32
Setting detail: THERAPIES SERIES
Discharge: HOME | End: 2022-06-28
Payer: COMMERCIAL

## 2022-06-28 PROCEDURE — 97035 APP MDLTY 1+ULTRASOUND EA 15: CPT

## 2022-06-28 PROCEDURE — 97110 THERAPEUTIC EXERCISES: CPT

## 2022-06-28 NOTE — PROGRESS NOTES
[address]  [] EVALUATION  [x] DAILY NOTE (LAND) [] DAILY NOTE (AQUATIC ) [] PROGRESS NOTE [] DISCHARGE NOTE    [x] OUTPATIENT REHABILITATION CENTER [facility]   [] [place] [address]    [] [facility]   [] Nayely Kebede    Date: 2022  Patient Name:  Brenda Aguilera  : 1990  MRN: [id number]  CSN: [id number]    Referring Practitioner MELANY Lara -*   Diagnosis Pain in right shoulder [M25.511]    Treatment Diagnosis Pain, limited motion, decreased strength right shoulder   Date of Evaluation 22      Functional Outcome Measure Used UEFS   Functional Outcome Score 45/80 (22)       Insurance: Primary: Payor: UMR /  /  / ,   Secondary:    Authorization Information: No precert needed, 60 vs. Combined OT/PT/ST per calendar year   Visit # 18, 2/10 for PN (progress note 22)   Visits Allowed: 20   Recertification Date: 2022   Physician Follow-Up: May 12, 2022   Physician Orders: Stephanie Minus and treat   Pertinent History: Pt. States her right shoulder and neck started hurting in January this year, not sure why. She went to the chiropractor for 4 weeks and had manipulations done and tens unit. After 4 weeks, pt. Was discharged from chiropractor but continued to have shoulder pain. So pt. Went to [facility] who have pt. Injections at her trigger points along upper trap and base of skull. Pt. States these helped for about 1 day but then the pain came back. Pt. Now referred to OT for possible shoulder RC issue. Pt. Did have xrays 22 which were negative. Pt. Has been prescribed muscle relaxer as well but states she has not noticed a difference with this. SUBJECTIVE:  Patient states her shoulder is sore due to having done yardwork the last 2 days.  States her right arm feels numb in the mornings - not sure if its how she is sleeping    OBJECTIVE:    TREATMENT   Precautions: General    Pain: 2/10 right shoulder upper trap and medial scap border      X in shaded column indicates Activity Completed Today   Modalities Parameters/  Location  Notes/Comments   Ultrasound right upper trap down slightly onto medial border of scapula, 100%, 1 MHz, 1 arreguin/cm2  xx                Manual Therapy Time/  Technique  Notes/Comments   IASTM to L upper trap, supraspinatus, medial border of scapula  xx Tight/knotted tissue palpated, softening noted after IASTM   Scapular mobilizations            Exercises   Sets/  Sec Reps  Notes/Comments   sidelying sleeper stretch 15 sec 5     Supine cross body stretch for posterior capsule stretching 15 sec 5     Wall slides with UE's in neutral 5 sec 10     Upper trap stretch 15 sec 3 xx    scap pinches 1 10     Corner stretch hands at 90 degrees ABD 15 sec 5     Supine angels on towel roll placed along spine, then chin tucks 1 10 ea xx    Supine SA punch 2# 1 15 xx    Ball on the wall x 4 directions RUE 1 10 ea xx    sidelying abduction with 2# to 90 degrees 1 10 xx    Sidelying ER with 2# 1 10 xx    [place]  green band, rows 1 15 ea xx    Standing diagonals on wall closed chain 1 15 xx Orange band   Standing horizontal abduction  1 10  [place] band. Trial this date for progression from supine to standing. Good tolerance, fatigue noted    biodex 90 speed 3 min bwd       Tricep/row/bicep 1 15 ea  Green band          Activities Time    Notes/Comments   Kinesiotape to right shoulder in Y strip to upper trap for inhibition and 2 I strips anterior to posterior for mechanical correction   Still having irritation of skin on 4/29. Will hold kinesiotaping for now.                      Specific Interventions Next Treatment: Ultrasound to right shoulder top of [address] joint, dry needling to trigger points right shoulder, stretching, ROM, trial kinesiotaping, RC strengthening    Activity/Treatment Tolerance:  [x]  Patient tolerated treatment well  []  Patient limited by fatigue  []  Patient limited by pain   []  Patient limited by other medical complications  []  Other:     Assessment: Patient progressing toward goals. Pt. Stated shoulder was tired by end of session  Areas for Improvement: impaired ROM, impaired strength and pain  Prognosis: good    GOALS:  Patient Goal: to decrease pain in right shoulder, be able to  her kids    Short Term Goals:  Time Frame: by 4 weeks  1. Pt. Will demo independence with HEP for right shoulder stretching, ROM and eventual strengthening for decreased pain - GOAL MET 5/19/22 WITH INDEPENDENCE WITH STRETCHING PROGRAM   Revised Goal: Pt. Will demo independence with strengthening HEP for RUE for improved endurance strength - GOAL MET 6/14/22 WITH PT. [address] WITH ORANGE THERABAND   Continue with upgrades to strengthening program    2. Pt. Will james improved AROM right shoulder to flexion= 145, abd= 125, and ER= 70 for ease with washing and styling her hair - Revised Goal: Pt. Will james improved Abduction right shoulder to 160 for ease with washing hair - GOAL MET 6/14/22 WITH ACTIVE ABD RIGHT SHOULDER= 168 - REPORTS ABILITY TO WASH AND STYLE HAIR    3. Pt. Will report decreased pain in right shoulder to no greater than 3/10 at rest for improved sleep - GOAL MET 5/19/22 WITH PT. REPORTING ONLY 1/10 PAIN IN RIGHT SHOULDER WITH CERTAIN MOVEMENTS  7/20/44 - PT. REPORTING CONTINUED 1/10 IN RIGHT UPPER TRAP      Long Term Goals:  Time Frame: by 8 weeks  1. Pt. Will james improved strength right shoulder to be able to lift her kids with minimal to no discomfort in right shoulder - GOAL MET 5/19/22 WITH PT. REPORTING ONLY MINIMAL DISCOMFORT WHEN PICKING UP HER CHILDREN    2. Pt. Will be able to complete household chores such as laundry (carry laundry basket, get clothes out of dryer) and push vacuum with minimal to no pain in right shoulder   Revised Goal: Pt.  Will kevino improved strength and endurance in right shoulder with report of no tiredness in RUE with household chores or cleaning. - GOAL NOT MET BUT IMPROVING 6/14/22 - STATES SHE WAS ABLE TO VACUUM WITH RUE TODAY BUT ARM SHOULDER WAS VERY FATIGUED AT END OF TASK- continue goal    Patient Education:   []  HEP/Education Completed: Plan of Care, Goals, purpose and care of kinesiotape, availability of dry needling   Medbridge for HEP: sleeper stretch, upper trap stretch combined with IR stretch with towel, wall slides, corner stretch, scap pinches   Added tre and SA isometric strengthening exs. Today   6/14/22 gave green band for upgrade to tre exercises and added Riivalid exercises with orange band to home program  [x]  No new Education completed  []  Reviewed HEP - issued green band for upgrade to HEP     []  Patient verbalized and/or demonstrated understanding of education provided. []  Patient unable to verbalize and/or demonstrate understanding of education provided. Will continue education. [x]  Barriers to learning: none    PLAN:  Treatment Recommendations: Strengthening, Range of Motion, Manual Therapy - Soft Tissue Mobilization, Home Exercise Program, Modalities and dry needling     []  Plan of care initiated. Plan to see patient 2 times per week for 8 weeks to address the treatment planned outlined above.   [x]  Continue with current plan of care  []  Modify plan of care as follows: Recommend continuation 1x/week x4 weeks per plan above   []  Hold pending physician visit  []  Discharge    Time In 0915   Time Out 0950   Timed Code Minutes: 35 min   Total Treatment Time: 35 min       Electronically Signed by:  Talat Linder OTR/L [id number]

## 2022-07-05 ENCOUNTER — HOSPITAL ENCOUNTER (OUTPATIENT)
Dept: OCCUPATIONAL THERAPY | Age: 32
Setting detail: THERAPIES SERIES
Discharge: HOME | End: 2022-07-05
Payer: COMMERCIAL

## 2022-07-05 PROCEDURE — 97110 THERAPEUTIC EXERCISES: CPT

## 2022-07-05 PROCEDURE — 97140 MANUAL THERAPY 1/> REGIONS: CPT

## 2022-07-05 NOTE — PROGRESS NOTES
Ultrasound right upper trap down slightly onto medial border of scapula, 100%, 1 MHz, 1 arreguin/cm2                  Manual Therapy Time/  Technique  Notes/Comments   IASTM to L upper trap, supraspinatus, medial border of scapula, pec minor  X Tight/knotted tissue palpated, softening noted after IASTM   Scapular mobilizations            Exercises   Sets/  Sec Reps  Notes/Comments   sidelying sleeper stretch 15 sec 5     Supine cross body stretch for posterior capsule stretching 15 sec 5     Wall slides with UE's in neutral 5 sec 10     Upper trap stretch 15 sec 3 X    scap pinches 1 10     Corner stretch hands at 90 degrees ABD 15 sec 5 X    Supine angels on towel roll placed along spine, then chin tucks 1 10 ea X    Supine SA punch 2# 1 15     Ball on the wall x 4 directions RUE 1 10 ea     sidelying abduction with 2# to 90 degrees 1 10     Sidelying ER with 2# 2 10 X    Rl  green band, rows 1 15 ea     Standing diagonals on wall closed chain 1 10 XX Orange band   Standing horizontal abduction  1 10  [place] band. Trial this date for progression from supine to standing. Good tolerance, fatigue noted    biodex 90 speed 3 min bwd       Tricep/row/bicep 1 15 ea  Green band          Activities Time    Notes/Comments   Kinesiotape to right shoulder in Y strip to upper trap for inhibition and 2 I strips anterior to posterior for mechanical correction   Still having irritation of skin on 4/29. Will hold kinesiotaping for now. Dry needling manual therapy: consisted on the placement of 60 mm needles in the following muscles:  R upper trap, pec.  A 60 mm needle was inserted, piston, rotated, and coned to produce intramuscular mobilization.  These techniques were used to restore functional range of motion, reduce muscle spasm and induce healing in the corresponding musculature.  (72932)  Clean Technique was utilized today while applying Dry needling treatment.  The treatment sites where cleaned with 70% solution of  isopropyl alcohol .  The PT washed their hands and utilized treatment gloves along with hand  prior to inserting the needles.  All needles where removed and discarded in the appropriate sharps container. MD has given verbal and/or written approval for this treatment. Specific Interventions Next Treatment: Ultrasound to right shoulder top of [facility] joint, dry needling to trigger points right shoulder, stretching, ROM, trial kinesiotaping, RC strengthening    Activity/Treatment Tolerance:  [x]  Patient tolerated treatment well  []  Patient limited by fatigue  []  Patient limited by pain   []  Patient limited by other medical complications  []  Other:     Assessment: Patient progressing toward goals. Pt. Stated shoulder was tired by end of session  Areas for Improvement: impaired ROM, impaired strength and pain  Prognosis: good    GOALS:  Patient Goal: to decrease pain in right shoulder, be able to  her kids    Short Term Goals:  Time Frame: by 4 weeks  1. Pt. Will demo independence with HEP for right shoulder stretching, ROM and eventual strengthening for decreased pain - GOAL MET 5/19/22 WITH INDEPENDENCE WITH STRETCHING PROGRAM   Revised Goal: Pt. Will demo independence with strengthening HEP for RUE for improved endurance strength - GOAL MET 6/14/22 WITH PT. [address] WITH ORANGE THERABAND   Continue with upgrades to strengthening program    2. Pt. Will demo improved AROM right shoulder to flexion= 145, abd= 125, and ER= 70 for ease with washing and styling her hair - Revised Goal: Pt. Will demo improved Abduction right shoulder to 160 for ease with washing hair - GOAL MET 6/14/22 WITH ACTIVE ABD RIGHT SHOULDER= 168 - REPORTS ABILITY TO WASH AND STYLE HAIR    3. Pt.  Will report decreased pain in right shoulder to no greater than 3/10 at rest for improved sleep - GOAL MET 5/19/22 WITH PT. REPORTING ONLY 1/10 PAIN IN RIGHT SHOULDER WITH CERTAIN MOVEMENTS  6/14/22 - PT. REPORTING CONTINUED 1/10 IN RIGHT UPPER TRAP      Long Term Goals:  Time Frame: by 8 weeks  1. Pt. Will james improved strength right shoulder to be able to lift her kids with minimal to no discomfort in right shoulder - GOAL MET 5/19/22 WITH PT. REPORTING ONLY MINIMAL DISCOMFORT WHEN PICKING UP HER CHILDREN    2. Pt. Will be able to complete household chores such as laundry (carry laundry basket, get clothes out of dryer) and push vacuum with minimal to no pain in right shoulder   Revised Goal: Pt. Will james improved strength and endurance in right shoulder with report of no tiredness in RUE with household chores or cleaning. - GOAL NOT MET BUT IMPROVING 6/14/22 - STATES SHE WAS ABLE TO VACUUM WITH RUE TODAY BUT ARM SHOULDER WAS VERY FATIGUED AT END OF TASK- continue goal    Patient Education:   []  HEP/Education Completed: Plan of Care, Goals, purpose and care of kinesiotape, availability of dry needling   Medbridge for HEP: sleeper stretch, upper trap stretch combined with IR stretch with towel, wall slides, corner stretch, scap pinches   Added tre and SA isometric strengthening exs. Today   6/14/22 gave green band for upgrade to tre exercises and added Riivalid exercises with orange band to home program  [x]  No new Education completed  []  Reviewed HEP - issued green band for upgrade to HEP     []  Patient verbalized and/or demonstrated understanding of education provided. []  Patient unable to verbalize and/or demonstrate understanding of education provided. Will continue education. [x]  Barriers to learning: none    PLAN:  Treatment Recommendations: Strengthening, Range of Motion, Manual Therapy - Soft Tissue Mobilization, Home Exercise Program, Modalities and dry needling     []  Plan of care initiated. Plan to see patient 2 times per week for 8 weeks to address the treatment planned outlined above.   [x]  Continue with current plan of care  []  Modify plan of care as follows: Recommend continuation 1x/week x4 weeks per plan above   []  Hold pending physician visit  []  Discharge    Time In 1000   Time Out 1040   Timed Code Minutes: 40 min   Total Treatment Time: 40 min       Electronically Signed by: Andra MONOTYA/TEO, CHT #[id number]

## 2022-07-12 ENCOUNTER — HOSPITAL ENCOUNTER (OUTPATIENT)
Dept: OCCUPATIONAL THERAPY | Age: 32
Setting detail: THERAPIES SERIES
Discharge: HOME | End: 2022-07-12
Payer: COMMERCIAL

## 2022-07-12 PROCEDURE — 97110 THERAPEUTIC EXERCISES: CPT

## 2022-07-12 PROCEDURE — 97035 APP MDLTY 1+ULTRASOUND EA 15: CPT

## 2022-07-12 NOTE — DISCHARGE SUMMARY
[address]  [] EVALUATION  [] DAILY NOTE (LAND) [] DAILY NOTE (AQUATIC ) [] PROGRESS NOTE [x] DISCHARGE NOTE    [x] OUTPATIENT REHABILITATION [facility]   [] [place] [address]    [] [facility]   [] Nicole Lehman    Date: 2022  Patient Name:  Umberto Coe  : 1990  MRN: [id number]  CSN: [id number]    Referring Practitioner MELANY Frankel -*   Diagnosis Pain in right shoulder [M25.511]    Treatment Diagnosis Pain, limited motion, decreased strength right shoulder   Date of Evaluation 22      Functional Outcome Measure Used UEFS   Functional Outcome Score 45/80 (22) 77/80 22      Insurance: Primary: Payor: UMR /  /  / ,   Secondary:    Authorization Information: No precert needed, 60 vs. Combined OT/PT/ST per calendar year   Visit # 20, 4/10 for PN (progress note 22)   Visits Allowed: 20   Recertification Date: 2022   Physician Follow-Up: May 12, 2022   Physician Orders: Ibis Garzaer and treat   Pertinent History: Pt. States her right shoulder and neck started hurting in January this year, not sure why. She went to the chiropractor for 4 weeks and had manipulations done and tens unit. After 4 weeks, pt. Was discharged from chiropractor but continued to have shoulder pain. So pt. Went to [facility] who have pt. Injections at her trigger points along upper trap and base of skull. Pt. States these helped for about 1 day but then the pain came back. Pt. Now referred to OT for possible shoulder RC issue. Pt. Did have xrays 22 which were negative. Pt. Has been prescribed muscle relaxer as well but states she has not noticed a difference with this. SUBJECTIVE:  Patient states that right upper trap is \"a little achy\" - states she has been doing lots of gardening/yardwork pulling weeds.      OBJECTIVE:    TREATMENT   Precautions: General    Pain: 210 right shoulder upper trap and medial scap border      X in shaded column indicates Activity Completed Today   Modalities Parameters/  Location  Notes/Comments   Ultrasound right upper trap down slightly onto medial border of scapula, 100%, 1 MHz, 1 arreguin/cm2  xx                Manual Therapy Time/  Technique  Notes/Comments   IASTM to L upper trap, supraspinatus, medial border of scapula  Xx    Scapular mobilizations            Exercises   Sets/  Sec Reps  Notes/Comments   sidelying sleeper stretch 15 sec 5     Supine cross body stretch for posterior capsule stretching 15 sec 5     Wall slides with UE's in neutral 5 sec 10     Upper trap stretch 15 sec 3 Xx    scap pinches 1 10     Corner stretch hands at 90 degrees ABD 15 sec 5     Supine angels on towel roll placed along spine, then chin tucks 1 10 ea     Supine SA punch 2# 1 15     Ball on the wall x 4 directions RUE 1 10 ea     sidelying abduction with 2# to 90 degrees 1 10     Sidelying ER with 2# 2 10     Rl  blue band, rows 1 10 ea Xx trialed blue band today   Standing diagonals on wall closed chain 1 10 Xx GREEN band today   Standing horizontal abduction  1 10 Xx Green band. Trial this date for progression from orange to green. Good tolerance, fatigue noted    biodex 90 speed 3 min bwd       Tricep/row/bicep 1 15 ea  Green band          Activities Time    Notes/Comments   Kinesiotape to right shoulder in Y strip to upper trap for inhibition and 2 I strips anterior to posterior for mechanical correction   Still having irritation of skin on 4/29. Will hold kinesiotaping for now.                      Specific Interventions Next Treatment: Ultrasound to right shoulder top of [address] joint, dry needling to trigger points right shoulder, stretching, ROM, trial kinesiotaping, RC strengthening    Activity/Treatment Tolerance:  [x]  Patient tolerated treatment well  []  Patient limited by fatigue  []  Patient limited by pain   []  Patient limited by other medical complications  []  Other:     Assessment: Patient has been seen a total of 20 visits in OT for right shoulder pain and treatment has included ultrasound, dry needling to trigger points, ROM and stretching, RC and periscapular strengthening, and STM/IASTM. Pt. [place]'s WNL AROM  Right shoulder and functional strength. Pt. Reports decreased pain in right shoulder with only a 1/10 pain rating with extended use with tasks like gardening and yardwork. Pt. Is independent with HEP for stretching and strengthening. Pt. Feels she can continue with strengthening HEP and was given next strength band (blue) today. Will discharge with goals met. Areas for Improvement: impaired ROM, impaired strength and pain  Prognosis: good    GOALS:  Patient Goal: to decrease pain in right shoulder, be able to  her kids    Short Term Goals:  Time Frame: by 4 weeks  1. Pt. Will demo independence with HEP for right shoulder stretching, ROM and eventual strengthening for decreased pain - GOAL MET 5/19/22 WITH INDEPENDENCE WITH STRETCHING PROGRAM   Revised Goal: Pt. Will demo independence with strengthening HEP for RUE for improved endurance strength - GOAL MET 6/14/22 WITH PT. [address]   Continue with upgrades to strengthening program  GOAL MET 7/12/22 - PT. HAS GREEN THERABAND FOR HEP AND WILL GIVE A BLUE BAND TODAY    2. Pt. Will indio improved AROM right shoulder to flexion= 145, abd= 125, and ER= 70 for ease with washing and styling her hair - Revised Goal: Pt. Will indio improved Abduction right shoulder to 160 for ease with washing hair - GOAL MET 6/14/22 WITH ACTIVE ABD RIGHT SHOULDER= 168 - REPORTS ABILITY TO [address] AND STYLE HAIR  GOAL MET    3. Pt. Will report decreased pain in right shoulder to no greater than 3/10 at rest for improved sleep - GOAL MET 5/19/22 WITH PT. REPORTING ONLY 1/10 PAIN IN RIGHT SHOULDER WITH CERTAIN MOVEMENTS  7/65/40 - PT. REPORTING CONTINUED 1/10 IN RIGHT UPPER TRAP  GOAL MET 7/12/22 - PT.  REPORTS IMPROVED SLEEP AND JUST 1/10 DISCOMFORT IN UPPER TRAP AT TIMES      Long Term Goals:  Time Frame: by 8 weeks  1. Pt. Will james improved strength right shoulder to be able to lift her kids with minimal to no discomfort in right shoulder - GOAL MET 5/19/22 WITH PT. REPORTING ONLY MINIMAL DISCOMFORT WHEN PICKING UP HER CHILDREN  GOAL MET 7/12/22    2. Pt. Will be able to complete household chores such as laundry (carry laundry basket, get clothes out of dryer) and push vacuum with minimal to no pain in right shoulder   Revised Goal: Pt. Will james improved strength and endurance in right shoulder with report of no tiredness in RUE with household chores or cleaning. - GOAL NOT MET BUT IMPROVING 6/14/22 - STATES SHE WAS ABLE TO VACUUM WITH RUE TODAY BUT ARM SHOULDER WAS VERY FATIGUED AT END OF TASK- continue goal  GOAL MET 7/12/22 MILD TIREDNESS BY END OF TASKS    Patient Education:   []  HEP/Education Completed: Plan of Care, Goals, purpose and care of kinesiotape, availability of dry needling   Medbridge for HEP: sleeper stretch, upper trap stretch combined with IR stretch with towel, wall slides, corner stretch, scap pinches   Added tre and SA isometric strengthening exs. Today   6/14/22 gave green band for upgrade to tre exercises and added Riivalid exercises with orange band to home program  []  No new Education completed  [x]  Reviewed HEP - issued blue band for upgrade to HEP     []  Patient verbalized and/or demonstrated understanding of education provided. []  Patient unable to verbalize and/or demonstrate understanding of education provided. Will continue education. [x]  Barriers to learning: none    PLAN:  Treatment Recommendations: Strengthening, Range of Motion, Manual Therapy - Soft Tissue Mobilization, Home Exercise Program, Modalities and dry needling     []  Plan of care initiated. Plan to see patient 2 times per week for 8 weeks to address the treatment planned outlined above.   []  Continue with current plan of care  []  Modify plan of care as follows: Recommend continuation 1x/week x4 weeks per plan above   []  Hold pending physician visit  [x]  Discharge    Time In 1300   Time Out 1340   Timed Code Minutes: 40 min   Total Treatment Time: 40 min       Electronically Signed by: Ben [place] OTR/L [id number]

## 2022-08-25 ENCOUNTER — TELEPHONE (OUTPATIENT)
Dept: NEUROLOGY | Age: 32
End: 2022-08-25

## 2022-08-25 DIAGNOSIS — G43.109 MIGRAINE WITH AURA AND WITHOUT STATUS MIGRAINOSUS, NOT INTRACTABLE: ICD-10-CM

## 2022-08-25 RX ORDER — ERENUMAB-AOOE 70 MG/ML
70 INJECTION SUBCUTANEOUS
Qty: 1 PEN | Refills: 3 | Status: SHIPPED | OUTPATIENT
Start: 2022-08-25 | End: 2022-08-29

## 2022-08-25 NOTE — TELEPHONE ENCOUNTER
Patient sent [employer] message stating she is having injection site reaction to the [facility]. Patient provided picture in [employer] message. Patient is requesting alternate injectable headache prevention medication. Patient insurance denied Naomi Roe previously stating she had to try and fail Emgality and Ajovy first. [id number] for [facility]? Please advise. Thank you.

## 2022-08-25 NOTE — TELEPHONE ENCOUNTER
We can try Aimovig 70 mg/ml subcutaneous injection.   Can put she failed Emgality due to injection site reaction  Antwan Hawley, CNP

## 2022-08-29 ENCOUNTER — TELEPHONE (OUTPATIENT)
Dept: NEUROLOGY | Age: 32
End: 2022-08-29

## 2022-08-29 DIAGNOSIS — G43.109 MIGRAINE WITH AURA AND WITHOUT STATUS MIGRAINOSUS, NOT INTRACTABLE: Primary | ICD-10-CM

## 2022-08-29 RX ORDER — FREMANEZUMAB-VFRM 225 MG/1.5ML
225 INJECTION SUBCUTANEOUS
Qty: 1 PEN | Refills: 3 | Status: SHIPPED | OUTPATIENT
Start: 2022-09-12 | End: 2022-10-17 | Stop reason: SINTOL

## 2022-08-29 NOTE — TELEPHONE ENCOUNTER
Insurance is denying [employer] stating patient has to try and fail both Emgality and Ajovy. Patient had injection site reaction to [facility]. Patient has not yet tried Ajovy. Please advise. Thank you.

## 2022-09-14 ENCOUNTER — OFFICE VISIT (OUTPATIENT)
Dept: FAMILY MEDICINE CLINIC | Age: 32
End: 2022-09-14
Payer: COMMERCIAL

## 2022-09-14 VITALS
DIASTOLIC BLOOD PRESSURE: 62 MMHG | WEIGHT: 160.8 LBS | TEMPERATURE: 98.9 F | HEART RATE: 82 BPM | BODY MASS INDEX: 30.36 KG/M2 | SYSTOLIC BLOOD PRESSURE: 102 MMHG | RESPIRATION RATE: 12 BRPM | HEIGHT: 61 IN

## 2022-09-14 DIAGNOSIS — F41.9 ANXIETY: ICD-10-CM

## 2022-09-14 DIAGNOSIS — K90.49 FOOD INTOLERANCE IN ADULT: Primary | ICD-10-CM

## 2022-09-14 DIAGNOSIS — G44.89 OTHER HEADACHE SYNDROME: ICD-10-CM

## 2022-09-14 PROCEDURE — 99213 OFFICE O/P EST LOW 20 MIN: CPT | Performed by: NURSE PRACTITIONER

## 2022-09-14 RX ORDER — FLUDROCORTISONE ACETATE 0.1 MG/1
0.1 TABLET ORAL DAILY
COMMUNITY

## 2022-09-14 ASSESSMENT — ENCOUNTER SYMPTOMS
SHORTNESS OF BREATH: 0
ANAL BLEEDING: 0
COUGH: 0
NAUSEA: 0
CONSTIPATION: 0
DIARRHEA: 0
EYE REDNESS: 0
COLOR CHANGE: 0
BLOOD IN STOOL: 0
EYE DISCHARGE: 0
ABDOMINAL PAIN: 0
ABDOMINAL DISTENTION: 0
RHINORRHEA: 0
SORE THROAT: 0

## 2022-09-14 NOTE — PROGRESS NOTES
Health Maintenance Due   Topic Date Due    Cervical cancer screen  09/05/2020    COVID-19 Vaccine (3 - Booster for Pfizer series) 06/05/2022    Flu vaccine (1) 09/01/2022

## 2022-09-14 NOTE — PROGRESS NOTES
[address] [facility] [address]  Dept: [phone]  Dept Fax: [phone]: [phone]    Visit Date: 9/14/2022    Nighat Miguel a 28 y.o. female who presents today for:   Chief Complaint   Patient presents with    Follow-up     HPI:     Last PAP on record was 2017 - daughter was born in 2019 - goes to see them next month.      Still taking the Zoloft and Buspar    Seeing Neurology for migraines     Wonders if she has a sensitivity to milk  HPI  Health Maintenance   Topic Date Due    Cervical cancer screen  09/05/2020    Flu vaccine (1) 09/01/2022    COVID-19 Vaccine (3 - Booster for Pfizer series) 03/14/2023 (Originally 6/5/2022)    Depression Monitoring  02/23/2023    DTaP/Tdap/Td vaccine (3 - Td or Tdap) 08/13/2029    Hepatitis C screen  Completed    HIV screen  Completed    Hepatitis A vaccine  Aged Out    Hepatitis B vaccine  Aged Out    Hib vaccine  Aged Out    Meningococcal (ACWY) vaccine  Aged Out    Pneumococcal 0-64 years Vaccine  Aged Out    Varicella vaccine  Discontinued       Current Outpatient Medications   Medication Sig Dispense Refill    fludrocortisone (FLORINEF) 0.1 MG tablet 0.1 mg daily Every other day      metoprolol tartrate (LOPRESSOR) 25 MG tablet Take 25 mg by mouth 2 times daily 0.5 tab bid      Fremanezumab-vfrm (AJOVY) 225 MG/1.5ML SOAJ Inject 225 mg into the skin every 30 days 1 pen 3    sertraline (ZOLOFT) 100 MG tablet TAKE 1 TABLET BY MOUTH EVERY DAY 90 tablet 1    busPIRone (BUSPAR) 10 MG tablet TAKE 1 TABLET BY MOUTH THREE TIMES A  tablet 1    UBRELVY 50 MG TABS TAKE 1 TABLET BY MOUTH AS NEEDED (HEADACHE) 10 tablet 2    fluticasone (FLONASE) 50 MCG/ACT nasal spray SPRAY 2 SPRAYS INTO EACH NOSTRIL EVERY DAY 16 g 1    MAGNESIUM CITRATE PO Take 250 mg by mouth daily      Multiple Vitamins-Minerals (CENTRUM) TABS Take by mouth daily      DHEA 25 MG TABS Take 12.5 mg by mouth daily loratadine (CLARITIN) 10 MG tablet Take 10 mg by mouth daily      ZINC PO Take by mouth as needed      magnesium (MAGNESIUM-OXIDE) 250 MG TABS tablet Take 250 mg by mouth daily       Omeprazole (PRILOSEC PO) Take 40 mg by mouth daily       acetaminophen (TYLENOL) 325 MG tablet Take 500 mg by mouth every 6 hours as needed for Pain      B Complex-C (SUPER B COMPLEX PO) Take 1 tablet by mouth daily      Cholecalciferol (VITAMIN D) 2000 units CAPS capsule 2,000 a day for 2 weeks and then 4,000 a day (Patient taking differently: Take 5,000 Units by mouth daily) 60 capsule 5     No current facility-administered medications for this visit. Allergies   Allergen Reactions    Dynapen [Dicloxacillin] Other (See Comments)     Feeling of pins and needles       Subjective:   Review of Systems   Constitutional:  Negative for chills, fatigue and fever. HENT:  Negative for congestion, ear pain, postnasal drip, rhinorrhea and sore throat. Eyes:  Negative for discharge and redness. Respiratory:  Negative for cough and shortness of breath. Cardiovascular:  Negative for chest pain and leg swelling. Gastrointestinal:  Negative for abdominal distention, abdominal pain, anal bleeding, blood in stool, constipation, diarrhea and nausea. Skin:  Negative for color change and rash. Neurological:  Negative for facial asymmetry, speech difficulty and weakness. Hematological:  Does not bruise/bleed easily. Psychiatric/Behavioral:  Negative for agitation and confusion. The patient is nervous/anxious. Objective:     Vitals:    09/14/22 0944   BP: 102/62   Site: Left Upper Arm   Position: Sitting   Cuff Size: Medium Adult   Pulse: 82   Resp: 12   Temp: 98.9 °F (37.2 °C)   TempSrc: Oral   Weight: 160 lb 12.8 oz (72.9 kg)   Height: 5' 0.5\" (1.537 m)       Body mass index is 30.89 kg/m².     Wt Readings from Last 3 Encounters:   09/14/22 160 lb 12.8 oz (72.9 kg)   06/20/22 158 lb (71.7 kg)   03/15/22 156 lb 9.6 oz (71 kg) BP Readings from Last 3 Encounters:   09/14/22 102/62   06/20/22 105/68   03/15/22 100/60       Physical Exam  Constitutional:       General: She is not in acute distress. Appearance: Normal appearance. She is well-developed. She is not ill-appearing or diaphoretic. HENT:      Head: Normocephalic and atraumatic. Right Ear: Hearing and external ear normal. No decreased hearing noted. Left Ear: Hearing and external ear normal. No decreased hearing noted. Nose: Nose normal. No nasal deformity. Eyes:      General:         Right eye: No discharge. Left eye: No discharge. Conjunctiva/sclera: Conjunctivae normal.   Pulmonary:      Effort: Pulmonary effort is normal. No respiratory distress. Abdominal:      General: There is no distension. Tenderness: There is no guarding. Musculoskeletal:         General: No tenderness or deformity. Normal range of motion. Cervical back: Normal range of motion and neck supple. Skin:     Coloration: Skin is not pale. Findings: No erythema or rash (On exposed areas). Neurological:      General: No focal deficit present. Mental Status: She is alert and oriented to person, place, and time. Gait: Gait normal.   Psychiatric:         Mood and Affect: Mood normal.         Speech: Speech normal.         Behavior: Behavior normal.         Thought Content:  Thought content normal.         Judgment: Judgment normal.       Lab Results   Component Value Date    WBC 5.1 04/21/2022    HGB 14.0 04/21/2022    HCT 41.9 04/21/2022     (L) 04/21/2022    CHOL 226 (H) 05/04/2021    TRIG 72 05/04/2021    HDL 76 05/04/2021    LDLCALC 136 05/04/2021    AST 18 12/09/2021     04/21/2022    K 4.4 04/21/2022     04/21/2022    CREATININE 0.70 04/21/2022    BUN 13 04/21/2022    CO2 26 04/21/2022    TSH 0.983 12/09/2021    GLUF 67 (L) 05/04/2021    LABA1C 5.0 10/21/2015    LABGLOM >90 12/09/2021    MG 2.1 05/04/2021    CALCIUM 9.50 04/21/2022    VITD25 57 05/04/2021       :       Diagnosis Orders   1. Food intolerance in adult  Common Food Allergen Profile      2. Other headache syndrome  Common Food Allergen Profile      3. Anxiety          :    Will get some blood work    Back in 6 months     Return in about 6 months (around 3/14/2023). Placed:  Orders Placed This Encounter   Procedures    Common Food Allergen Profile     Medications Prescribed:  No orders of the defined types were placed in this encounter. Discussed use,benefit, and side effects of prescribed medications. Barriers to medication complianceaddressed. All patient questions answered. Pt voiced understanding.      Electronicallysigned by EMLANY Nuno CNP on 9/14/2022 at 10:14 AM

## 2022-09-16 ENCOUNTER — NURSE ONLY (OUTPATIENT)
Dept: LAB | Age: 32
End: 2022-09-16

## 2022-09-16 DIAGNOSIS — K90.49 FOOD INTOLERANCE IN ADULT: ICD-10-CM

## 2022-09-16 DIAGNOSIS — G44.89 OTHER HEADACHE SYNDROME: ICD-10-CM

## 2022-09-19 ENCOUNTER — TELEPHONE (OUTPATIENT)
Dept: NEUROLOGY | Age: 32
End: 2022-09-19

## 2022-09-19 NOTE — TELEPHONE ENCOUNTER
Noted.  Bethany Nair, CNP
Patient sent [employer] message with pictures stating she took last Aimovig injection and slight injection site reaction of redness. She is willing to try [address] again next month and will report any more reactions.
Spontaneous, unlabored and symmetrical

## 2022-09-20 LAB
ALLERGEN BARLEY IGE: < 0.1 KU/L
ALLERGEN BEEF: < 0.1 KU/L
ALLERGEN CABBAGE IGE: < 0.1 KU/L
ALLERGEN CARROT IGE: < 0.1 KU/L
ALLERGEN CHICKEN IGE: < 0.1 KU/L
ALLERGEN CODFISH IGE: < 0.1 KU/L
ALLERGEN CORN IGE: < 0.1 KU/L
ALLERGEN CRAB IGE: < 0.1 KU/L
ALLERGEN EGG WHITE IGE: < 0.1 KU/L
ALLERGEN GRAPE IGE: < 0.1 KU/L
ALLERGEN INTERPRETATION/SCORE: NORMAL
ALLERGEN LETTUCE IGE: < 0.1 KU/L
ALLERGEN MILK IGE: < 0.1 KU/L
ALLERGEN NAVY BEAN: < 0.1 KU/L
ALLERGEN OAT: < 0.1 KU/L
ALLERGEN ORANGE IGE: < 0.1 KU/L
ALLERGEN PEPPER C. ANNUUM IGE: < 0.1 KU/L
ALLERGEN PORK: < 0.1 KU/L
ALLERGEN POTATO IGE: < 0.1 KU/L
ALLERGEN RICE IGE: < 0.1 KU/L
ALLERGEN RYE IGE: < 0.1 KU/L
ALLERGEN SHRIMP IGE: < 0.1 KU/L
ALLERGEN SOYBEAN IGE: < 0.1 KU/L
ALLERGEN TOMATO IGE: < 0.1 KU/L
ALLERGEN TUNA IGE: < 0.1 KU/L
ALLERGEN WHEAT IGE: < 0.1 KU/L

## 2022-10-17 ENCOUNTER — NURSE ONLY (OUTPATIENT)
Dept: LAB | Age: 32
End: 2022-10-17

## 2022-10-17 DIAGNOSIS — G43.109 MIGRAINE WITH AURA AND WITHOUT STATUS MIGRAINOSUS, NOT INTRACTABLE: Primary | ICD-10-CM

## 2022-10-17 RX ORDER — ERENUMAB-AOOE 140 MG/ML
140 INJECTION, SOLUTION SUBCUTANEOUS
Qty: 1 ADJUSTABLE DOSE PRE-FILLED PEN SYRINGE | Refills: 3 | Status: SHIPPED | OUTPATIENT
Start: 2022-10-17

## 2022-10-17 NOTE — TELEPHONE ENCOUNTER
Patient sent [employer] message stating she had bad reaction to Ajovy with last injection. She has also tried [facility] per her insurance request and had worsening injection site reactions in the past. She is asking to go back on the Aimovig as she tolerated that well in the past. Spoke with Dr Kyle Napier who stated ok to continue with Ruy Ring. Patient requesting script be sent to pharmacy.

## 2022-10-20 LAB — HERPES SIMPLEX CULTURE: NORMAL

## 2022-11-22 ENCOUNTER — OFFICE VISIT (OUTPATIENT)
Dept: FAMILY MEDICINE CLINIC | Age: 32
End: 2022-11-22
Payer: COMMERCIAL

## 2022-11-22 VITALS
SYSTOLIC BLOOD PRESSURE: 120 MMHG | WEIGHT: 157 LBS | DIASTOLIC BLOOD PRESSURE: 64 MMHG | HEART RATE: 68 BPM | RESPIRATION RATE: 16 BRPM | BODY MASS INDEX: 30.16 KG/M2

## 2022-11-22 DIAGNOSIS — H60.391 OTITIS, EXTERNA, INFECTIVE, RIGHT: Primary | ICD-10-CM

## 2022-11-22 PROCEDURE — 99213 OFFICE O/P EST LOW 20 MIN: CPT | Performed by: NURSE PRACTITIONER

## 2022-11-22 RX ORDER — CIPROFLOXACIN AND DEXAMETHASONE 3; 1 MG/ML; MG/ML
4 SUSPENSION/ DROPS AURICULAR (OTIC) 2 TIMES DAILY
Qty: 1 EACH | Refills: 1 | Status: SHIPPED | OUTPATIENT
Start: 2022-11-22 | End: 2022-11-29

## 2022-11-22 NOTE — PROGRESS NOTES
[facility]  [address] [address]  Dept: [phone]  Loc: [phone]      Visit Date: 11/22/2022    Blanche Cunningham a 28 y.o. female who presents today for:   Chief Complaint   Patient presents with    Ear Problem     Pt c/o a possible right ear infection. Pt has been having some ear fullness and pain. Ear fullness started Monday morning, but pt has been having chills for about 4 days and would like to discuss. HPI:     Using Flonase daily and claritin    4 days of right ear pain and fullness - some chills - just finished Keflex from another office.      HPI  Health Maintenance   Topic Date Due    Cervical cancer screen  09/05/2020    COVID-19 Vaccine (3 - Booster for Mishra Peter series) 03/14/2023 (Originally 3/2/2022)    Depression Monitoring  02/23/2023    DTaP/Tdap/Td vaccine (3 - Td or Tdap) 08/13/2029    Flu vaccine  Completed    Hepatitis C screen  Completed    HIV screen  Completed    Hepatitis A vaccine  Aged Out    Hib vaccine  Aged Out    Meningococcal (ACWY) vaccine  Aged Out    Pneumococcal 0-64 years Vaccine  Aged Out    Varicella vaccine  Discontinued       Current Outpatient Medications   Medication Sig Dispense Refill    mupirocin (BACTROBAN) 2 % ointment APPLY TO AFFECTED AREA 3 TIMES A DAY FOR 14 DAYS      ciprofloxacin-dexamethasone (CIPRODEX) 0.3-0.1 % otic suspension Place 4 drops into both ears 2 times daily for 7 days 1 each 1    Erenumab-aooe (AIMOVIG) 140 MG/ML SOAJ Inject 140 mg into the skin every 30 days 1 Adjustable Dose Pre-filled Pen Syringe 3    fludrocortisone (FLORINEF) 0.1 MG tablet 0.1 mg daily Every other day      metoprolol tartrate (LOPRESSOR) 25 MG tablet Take 25 mg by mouth 2 times daily 0.5 tab bid      sertraline (ZOLOFT) 100 MG tablet TAKE 1 TABLET BY MOUTH EVERY DAY 90 tablet 1    busPIRone (BUSPAR) 10 MG tablet TAKE 1 TABLET BY MOUTH THREE TIMES A  tablet 1    UBRELVY 50 MG TABS TAKE 1 TABLET BY MOUTH AS NEEDED (HEADACHE) 10 tablet 2    fluticasone (FLONASE) 50 MCG/ACT nasal spray SPRAY 2 SPRAYS INTO EACH NOSTRIL EVERY DAY 16 g 1    MAGNESIUM CITRATE PO Take 250 mg by mouth daily      Multiple Vitamins-Minerals (CENTRUM) TABS Take by mouth daily      DHEA 25 MG TABS Take 12.5 mg by mouth daily      loratadine (CLARITIN) 10 MG tablet Take 10 mg by mouth daily      ZINC PO Take by mouth as needed      magnesium (MAGNESIUM-OXIDE) 250 MG TABS tablet Take 250 mg by mouth daily       Omeprazole (PRILOSEC PO) Take 40 mg by mouth daily       acetaminophen (TYLENOL) 325 MG tablet Take 500 mg by mouth every 6 hours as needed for Pain      B Complex-C (SUPER B COMPLEX PO) Take 1 tablet by mouth daily      Cholecalciferol (VITAMIN D) 2000 units CAPS capsule 2,000 a day for 2 weeks and then 4,000 a day (Patient taking differently: Take 5,000 Units by mouth daily) 60 capsule 5     No current facility-administered medications for this visit. Allergies   Allergen Reactions    Dynapen [Dicloxacillin] Other (See Comments)     Feeling of pins and needles       Subjective:   Review of Systems   HENT:  Positive for congestion and ear pain. Objective:     Vitals:    11/22/22 0940   BP: 120/64   Pulse: 68   Resp: 16   Weight: 157 lb (71.2 kg)       Body mass index is 30.16 kg/m². Wt Readings from Last 3 Encounters:   11/22/22 157 lb (71.2 kg)   09/14/22 160 lb 12.8 oz (72.9 kg)   06/20/22 158 lb (71.7 kg)     BP Readings from Last 3 Encounters:   11/22/22 120/64   09/14/22 102/62   06/20/22 105/68       Physical Exam  HENT:      Right Ear: A middle ear effusion is present. Tympanic membrane is injected, erythematous and bulging. Left Ear: A middle ear effusion is present.        Lab Results   Component Value Date    WBC 5.1 04/21/2022    HGB 14.0 04/21/2022    HCT 41.9 04/21/2022     (L) 04/21/2022    CHOL 226 (H) 05/04/2021    TRIG 72 05/04/2021    HDL 76 05/04/2021    LDLCALC 136 05/04/2021 AST 18 12/09/2021     04/21/2022    K 4.4 04/21/2022     04/21/2022    CREATININE 0.70 04/21/2022    BUN 13 04/21/2022    CO2 26 04/21/2022    TSH 0.983 12/09/2021    GLUF 67 (L) 05/04/2021    LABA1C 5.0 10/21/2015    LABGLOM >90 12/09/2021    MG 2.1 05/04/2021    CALCIUM 9.50 04/21/2022    VITD25 57 05/04/2021       :       Diagnosis Orders   1. Otitis, externa, infective, right  ciprofloxacin-dexamethasone (CIPRODEX) 0.3-0.1 % otic suspension          :    Will give cipro-dex drops for the ear  Warm compresses  Tylenol for discomfort  Return if symptoms worsen or fail to improve. Placed:  No orders of the defined types were placed in this encounter. Medications Prescribed:  Orders Placed This Encounter   Medications    ciprofloxacin-dexamethasone (CIPRODEX) 0.3-0.1 % otic suspension     Sig: Place 4 drops into both ears 2 times daily for 7 days     Dispense:  1 each     Refill:  1          Discussed use,benefit, and side effects of prescribed medications. Barriers to medication complianceaddressed. All patient questions answered. Pt voiced understanding.      Electronicallysigned by MELANY Gloria CNP on 11/22/2022 at 10:05 AM

## 2023-02-09 DIAGNOSIS — G43.109 MIGRAINE WITH AURA AND WITHOUT STATUS MIGRAINOSUS, NOT INTRACTABLE: ICD-10-CM

## 2023-02-09 RX ORDER — ERENUMAB-AOOE 140 MG/ML
140 INJECTION, SOLUTION SUBCUTANEOUS
Qty: 1 ADJUSTABLE DOSE PRE-FILLED PEN SYRINGE | Refills: 3 | Status: SHIPPED | OUTPATIENT
Start: 2023-02-09

## 2023-02-27 ENCOUNTER — OFFICE VISIT (OUTPATIENT)
Dept: NEUROLOGY | Age: 33
End: 2023-02-27
Payer: COMMERCIAL

## 2023-02-27 VITALS
WEIGHT: 160 LBS | OXYGEN SATURATION: 99 % | HEART RATE: 73 BPM | DIASTOLIC BLOOD PRESSURE: 66 MMHG | SYSTOLIC BLOOD PRESSURE: 104 MMHG | HEIGHT: 61 IN | BODY MASS INDEX: 30.21 KG/M2

## 2023-02-27 DIAGNOSIS — M54.2 NECK PAIN: ICD-10-CM

## 2023-02-27 DIAGNOSIS — G43.109 MIGRAINE WITH AURA AND WITHOUT STATUS MIGRAINOSUS, NOT INTRACTABLE: Primary | ICD-10-CM

## 2023-02-27 PROCEDURE — 99213 OFFICE O/P EST LOW 20 MIN: CPT | Performed by: NURSE PRACTITIONER

## 2023-02-27 RX ORDER — UBROGEPANT 50 MG/1
TABLET ORAL
Qty: 10 TABLET | Refills: 4 | Status: SHIPPED | OUTPATIENT
Start: 2023-02-27

## 2023-02-27 RX ORDER — ERENUMAB-AOOE 140 MG/ML
140 INJECTION, SOLUTION SUBCUTANEOUS
Qty: 3 ADJUSTABLE DOSE PRE-FILLED PEN SYRINGE | Refills: 4 | Status: SHIPPED | OUTPATIENT
Start: 2023-02-27

## 2023-02-27 NOTE — PROGRESS NOTES
NEUROLOGY OUT PATIENT FOLLOW UP NOTE:  2/27/202310:25 AM    Abdirashid Lee is here for follow up for headache.              Allergies   Allergen Reactions    Dynapen [Dicloxacillin] Other (See Comments)     Feeling of pins and needles       Current Outpatient Medications:     AIMOVIG 140 MG/ML SOAJ, INJECT 140 MG INTO THE SKIN EVERY 30 DAYS, Disp: 1 Adjustable Dose Pre-filled Pen Syringe, Rfl: 3    fludrocortisone (FLORINEF) 0.1 MG tablet, 0.1 mg daily Every other day, Disp: , Rfl:     metoprolol tartrate (LOPRESSOR) 25 MG tablet, Take 25 mg by mouth 2 times daily 0.5 tab bid, Disp: , Rfl:     sertraline (ZOLOFT) 100 MG tablet, TAKE 1 TABLET BY MOUTH EVERY DAY, Disp: 90 tablet, Rfl: 1    busPIRone (BUSPAR) 10 MG tablet, TAKE 1 TABLET BY MOUTH THREE TIMES A DAY, Disp: 270 tablet, Rfl: 1    UBRELVY 50 MG TABS, TAKE 1 TABLET BY MOUTH AS NEEDED (HEADACHE), Disp: 10 tablet, Rfl: 2    fluticasone (FLONASE) 50 MCG/ACT nasal spray, SPRAY 2 SPRAYS INTO EACH NOSTRIL EVERY DAY, Disp: 16 g, Rfl: 1    MAGNESIUM CITRATE PO, Take 250 mg by mouth daily, Disp: , Rfl:     Multiple Vitamins-Minerals (CENTRUM) TABS, Take by mouth daily, Disp: , Rfl:     loratadine (CLARITIN) 10 MG tablet, Take 10 mg by mouth daily, Disp: , Rfl:     ZINC PO, Take by mouth as needed, Disp: , Rfl:     magnesium (MAGNESIUM-OXIDE) 250 MG TABS tablet, Take 250 mg by mouth daily , Disp: , Rfl:     Omeprazole (PRILOSEC PO), Take 40 mg by mouth daily , Disp: , Rfl:     acetaminophen (TYLENOL) 325 MG tablet, Take 500 mg by mouth every 6 hours as needed for Pain, Disp: , Rfl:     B Complex-C (SUPER B COMPLEX PO), Take 1 tablet by mouth daily, Disp: , Rfl:     Cholecalciferol (VITAMIN D) 2000 units CAPS capsule, 2,000 a day for 2 weeks and then 4,000 a day (Patient taking differently: Take 5,000 Units by mouth daily), Disp: 60 capsule, Rfl: 5    mupirocin (BACTROBAN) 2 % ointment, APPLY TO AFFECTED AREA 3 TIMES A DAY FOR 14 DAYS, Disp: , Rfl:     DHEA 25 MG TABS, Take 12.5 mg by mouth daily (Patient not taking: Reported on 2/27/2023), Disp: , Rfl:     I reviewed the past medical history, allergies, medications, social history and family history. PE:   Vitals:    02/27/23 1017   BP: 104/66   Site: Left Upper Arm   Position: Sitting   Cuff Size: Medium Adult   Pulse: 73   SpO2: 99%   Weight: 160 lb (72.6 kg)   Height: 5' 1\" (1.549 m)     General Appearance:  awake, alert, oriented, in no  distress  Gen: NAD, Language is Intact. Skin: no rash, lesion, dry  to touch. warm  Head: no rash, no icterus  Neck: There is no carotid bruits. The Neck is supple. There is no neck lymphadenopathy. Neuro: CN 2-12 grossly intact with no focal deficits. Power 5/5 Throughout symmetric, Reflexes are +2 symmetric. Long tracts are intact. Cerebellar exam is Intact. Sensory exam is intact to light touch. Gait is intact. Musculoskeletal:  Has no hand arthritis, no limitation of ROM in any of the four extremities. Lower extremities no edema          DATA:         Results for orders placed or performed in visit on 11/03/22   HIV Screen   Result Value Ref Range    HIV Ag/Ab NONREACTIVE NR   Hepatitis C Antibody   Result Value Ref Range    Hepatitis C Ab Negative    Hepatitis B Surface Antigen   Result Value Ref Range    Hepatitis B Surface Ag Negative    RPR   Result Value Ref Range    RPR NONREACTIVE NONREACTIVE          No results found for this or any previous visit. Results for orders placed during the hospital encounter of 12/08/14    MRA Head WO Contrast    Narrative  PROCEDURE: MRA HEAD WO CONTRAST    CLINICAL INFORMATION: Headache(784.0)    COMPARISON: None available. TECHNIQUE: 3-D time-of-flight MRA brain followed by MIP reconstructions. FINDINGS:  Basilar artery is patent. Carotid siphons are patent. Proximal portions of the major cerebral arteries are patent. No MR angiographic evidence of an intracranial aneurysm greater than 3 mm.     Impression  Unremarkable MRA of the brain . Results for orders placed during the hospital encounter of 11/17/20    MRI BRAIN W WO CONTRAST    Narrative  PROCEDURE: MRI BRAIN W WO CONTRAST    INDICATION:White matter abnormality on MRI of brain, Migraine with aura and without status migrainosus, not intractable. Chronic headaches. COMPARISON: 4/16/2018. TECHNIQUE: Multiplanar and multiple spin echo T1 and T2-weighted images were obtained through the brain before and after the administration of intravenous contrast. 15 mL ProHance was injected in the right AC. FINDINGS:  There is mild crowding at the foramen magnum on the left with minimal protrusion of the left cerebellar tonsil below the level of the foramen magnum, stable compared to prior exam. The ventricles, cisterns and sulci are otherwise symmetric and normal in  size and configuration. There is a small focal area of T2/FLAIR prolongation in the white matter of the right frontal lobe, unchanged compared to prior exam. No other focal areas of abnormal T2/flair prolongation are identified within the parenchyma. No  intra or extra-axial mass is identified. No focal areas of restricted diffusion are present. Following contrast administration, there are no focal areas of abnormal parenchymal or meningeal enhancement identified. The major vascular flow voids appear patent. Orbits are unremarkable. There is trace mucosal thickening in the maxillary sinuses and right ethmoid air cells. Mastoid air cells are clear. Impression  1. No evidence of acute intracranial abnormality. 2. Stable small focus of T2/FLAIR hyperintensity in the right frontal white matter likely representing a small focus of gliosis. **This report has been created using voice recognition software. It may contain minor errors which are inherent in voice recognition technology. **      Final report electronically signed by Dr. Geno Navarro MD on 11/17/2020 3:15 PM    No results found for this or any previous visit. No results found for this or any previous visit. Assessment:     Diagnosis Orders   1. Migraine with aura and without status migrainosus, not intractable        2. Neck pain             Follow up for migraine headache. She was last seen 1/10/22. Her headaches have improved in frequency and severity. She is on aimovig 140 mg monthly injection and she also uses ubrelvy for breakthrough headache which also helps. She can have some injection site reaction with the Aimovig however this usually clears within 48 hours and is less intense that when she was on emgality. Overall she is pleased with how she is doing. After detailed discussion with patient we agreed on the following plan. Plan:  Continue with Aimovig 140 mg/ml monthly subcutaneous injection  Continue with Ubrelvy 50 mg as needed for breakthrough headache  Keep headache diary  Follow up in 12 months or sooner if needed. Call if any questions or concerns.        Total time 26 min    Rachana Steward, APRN - CNP

## 2023-03-09 DIAGNOSIS — F41.9 ANXIETY: ICD-10-CM

## 2023-03-09 RX ORDER — SERTRALINE HYDROCHLORIDE 100 MG/1
TABLET, FILM COATED ORAL
Qty: 90 TABLET | Refills: 1 | Status: SHIPPED | OUTPATIENT
Start: 2023-03-09

## 2023-03-09 NOTE — TELEPHONE ENCOUNTER
This medication refill is regarding a electronic request. Refill requested by  [facility] . Requested Prescriptions     Pending Prescriptions Disp Refills    sertraline (ZOLOFT) 100 MG tablet [Pharmacy Med Name: SERTRALINE  MG TABLET] 90 tablet 1     Sig: TAKE 1 TABLET BY MOUTH EVERY DAY       Date of last visit: 6/20/2022   Date of next visit: 3/14/2023  Date of last refill: 6/2/2022 #90/1  Pharmacy Name: [id number]          Rx verified, ordered and set to EP.

## 2023-03-14 ENCOUNTER — OFFICE VISIT (OUTPATIENT)
Dept: FAMILY MEDICINE CLINIC | Age: 33
End: 2023-03-14
Payer: COMMERCIAL

## 2023-03-14 VITALS
RESPIRATION RATE: 16 BRPM | WEIGHT: 157.6 LBS | HEART RATE: 68 BPM | SYSTOLIC BLOOD PRESSURE: 120 MMHG | DIASTOLIC BLOOD PRESSURE: 60 MMHG | BODY MASS INDEX: 29.78 KG/M2

## 2023-03-14 DIAGNOSIS — K21.9 GASTROESOPHAGEAL REFLUX DISEASE WITHOUT ESOPHAGITIS: Primary | ICD-10-CM

## 2023-03-14 DIAGNOSIS — F41.9 ANXIETY: ICD-10-CM

## 2023-03-14 PROCEDURE — 99214 OFFICE O/P EST MOD 30 MIN: CPT | Performed by: NURSE PRACTITIONER

## 2023-03-14 RX ORDER — BUSPIRONE HYDROCHLORIDE 10 MG/1
TABLET ORAL
Qty: 90 TABLET | Refills: 5 | Status: SHIPPED | OUTPATIENT
Start: 2023-03-14

## 2023-03-14 RX ORDER — OMEPRAZOLE 40 MG/1
40 CAPSULE, DELAYED RELEASE ORAL
Qty: 90 CAPSULE | Refills: 1 | Status: SHIPPED | OUTPATIENT
Start: 2023-03-14

## 2023-03-14 RX ORDER — HYDROXYZINE HYDROCHLORIDE 10 MG/1
10 TABLET, FILM COATED ORAL 3 TIMES DAILY PRN
Qty: 90 TABLET | Refills: 1 | Status: SHIPPED | OUTPATIENT
Start: 2023-03-14 | End: 2023-04-13

## 2023-03-14 SDOH — ECONOMIC STABILITY: FOOD INSECURITY: WITHIN THE PAST 12 MONTHS, YOU WORRIED THAT YOUR FOOD WOULD RUN OUT BEFORE YOU GOT MONEY TO BUY MORE.: NEVER TRUE

## 2023-03-14 SDOH — ECONOMIC STABILITY: INCOME INSECURITY: HOW HARD IS IT FOR YOU TO PAY FOR THE VERY BASICS LIKE FOOD, HOUSING, MEDICAL CARE, AND HEATING?: NOT HARD AT ALL

## 2023-03-14 SDOH — ECONOMIC STABILITY: HOUSING INSECURITY
IN THE LAST 12 MONTHS, WAS THERE A TIME WHEN YOU DID NOT HAVE A STEADY PLACE TO SLEEP OR SLEPT IN A SHELTER (INCLUDING NOW)?: NO

## 2023-03-14 SDOH — ECONOMIC STABILITY: FOOD INSECURITY: WITHIN THE PAST 12 MONTHS, THE FOOD YOU BOUGHT JUST DIDN'T LAST AND YOU DIDN'T HAVE MONEY TO GET MORE.: NEVER TRUE

## 2023-03-14 ASSESSMENT — PATIENT HEALTH QUESTIONNAIRE - PHQ9
2. FEELING DOWN, DEPRESSED OR HOPELESS: 0
4. FEELING TIRED OR HAVING LITTLE ENERGY: 0
SUM OF ALL RESPONSES TO PHQ QUESTIONS 1-9: 0
6. FEELING BAD ABOUT YOURSELF - OR THAT YOU ARE A FAILURE OR HAVE LET YOURSELF OR YOUR FAMILY DOWN: 0
SUM OF ALL RESPONSES TO PHQ QUESTIONS 1-9: 0
SUM OF ALL RESPONSES TO PHQ QUESTIONS 1-9: 0
3. TROUBLE FALLING OR STAYING ASLEEP: 0
9. THOUGHTS THAT YOU WOULD BE BETTER OFF DEAD, OR OF HURTING YOURSELF: 0
5. POOR APPETITE OR OVEREATING: 0
SUM OF ALL RESPONSES TO PHQ9 QUESTIONS 1 & 2: 0
SUM OF ALL RESPONSES TO PHQ QUESTIONS 1-9: 0
8. MOVING OR SPEAKING SO SLOWLY THAT OTHER PEOPLE COULD HAVE NOTICED. OR THE OPPOSITE, BEING SO FIGETY OR RESTLESS THAT YOU HAVE BEEN MOVING AROUND A LOT MORE THAN USUAL: 0
10. IF YOU CHECKED OFF ANY PROBLEMS, HOW DIFFICULT HAVE THESE PROBLEMS MADE IT FOR YOU TO DO YOUR WORK, TAKE CARE OF THINGS AT HOME, OR GET ALONG WITH OTHER PEOPLE: 0
1. LITTLE INTEREST OR PLEASURE IN DOING THINGS: 0
7. TROUBLE CONCENTRATING ON THINGS, SUCH AS READING THE NEWSPAPER OR WATCHING TELEVISION: 0

## 2023-03-14 ASSESSMENT — ENCOUNTER SYMPTOMS
DIARRHEA: 0
RHINORRHEA: 0
ABDOMINAL DISTENTION: 0
ANAL BLEEDING: 0
COLOR CHANGE: 0
EYE DISCHARGE: 0
EYE REDNESS: 0
SHORTNESS OF BREATH: 0
NAUSEA: 0
ABDOMINAL PAIN: 0
COUGH: 0
CONSTIPATION: 0
SORE THROAT: 0
BLOOD IN STOOL: 0

## 2023-03-14 NOTE — PATIENT INSTRUCTIONS
Atarax 3 times daily as needed - if it makes you too sleepy we will consider a different medication    Refill prilosec - GERD symptom relief:  Weight loss  Avoid laying down for at least 2 hours after a meal  Eat frequent small meals  Avoid tobacco and alcohol  Diet: avoid fatty foods, caffeine, chocolate, spicy foods, carbonated beverages, peppermint, tomatoes, or any other known triggers    Back in 6 months

## 2023-03-14 NOTE — PROGRESS NOTES
[facility] FAM[facility]  [address] [facility] [address]  Dept: [phone]  Loc: [phone]      Visit Date: 3/14/2023    Barbara Miguel a 28 y.o. female who presents today for:   Chief Complaint   Patient presents with    6 Month Follow-Up     Pt would like to discuss her anxiety and having KIMI take over Omeprazole prescription. HPI:     Feels like her anxiety is really high - on edge and her brain is racing. Taking Buspar 2-3 times daily. Zoloft 100 mg daily    Sleeping fine    Wellbutrin gave her vivid dreams.    HPI  Health Maintenance   Topic Date Due    Diabetic retinal exam  Never done    Cervical cancer screen  09/05/2020    Depression Monitoring  02/23/2023    COVID-19 Vaccine (3 - Booster for Mishra Peter series) 03/14/2023 (Originally 3/2/2022)    DTaP/Tdap/Td vaccine (3 - Td or Tdap) 08/13/2029    Flu vaccine  Completed    Hepatitis C screen  Completed    HIV screen  Completed    Hepatitis A vaccine  Aged Out    Hib vaccine  Aged Out    Meningococcal (ACWY) vaccine  Aged Out    Pneumococcal 0-64 years Vaccine  Aged Out    Varicella vaccine  Discontinued       Current Outpatient Medications   Medication Sig Dispense Refill    busPIRone (BUSPAR) 10 MG tablet TAKE 1 TABLET BY MOUTH THREE TIMES A DAY 90 tablet 5    omeprazole (PRILOSEC) 40 MG delayed release capsule Take 1 capsule by mouth every morning (before breakfast) 90 capsule 1    hydrOXYzine HCl (ATARAX) 10 MG tablet Take 1 tablet by mouth 3 times daily as needed for Anxiety 90 tablet 1    sertraline (ZOLOFT) 100 MG tablet TAKE 1 TABLET BY MOUTH EVERY DAY 90 tablet 1    Erenumab-aooe (AIMOVIG) 140 MG/ML SOAJ Inject 140 mg into the skin every 30 days 3 Adjustable Dose Pre-filled Pen Syringe 4    Ubrogepant (UBRELVY) 50 MG TABS TAKE 1 TABLET BY MOUTH AS NEEDED (HEADACHE) 10 tablet 4    fludrocortisone (FLORINEF) 0.1 MG tablet 0.1 mg daily Every other day      metoprolol tartrate (LOPRESSOR) 25 MG tablet Take 25 mg by mouth 2 times daily 0.5 tab bid      fluticasone (FLONASE) 50 MCG/ACT nasal spray SPRAY 2 SPRAYS INTO EACH NOSTRIL EVERY DAY 16 g 1    MAGNESIUM CITRATE PO Take 250 mg by mouth daily      Multiple Vitamins-Minerals (CENTRUM) TABS Take by mouth daily      loratadine (CLARITIN) 10 MG tablet Take 10 mg by mouth daily      ZINC PO Take by mouth as needed      magnesium (MAGNESIUM-OXIDE) 250 MG TABS tablet Take 250 mg by mouth daily       Omeprazole (PRILOSEC PO) Take 40 mg by mouth daily       acetaminophen (TYLENOL) 325 MG tablet Take 500 mg by mouth every 6 hours as needed for Pain      B Complex-C (SUPER B COMPLEX PO) Take 1 tablet by mouth daily      Cholecalciferol (VITAMIN D) 2000 units CAPS capsule 2,000 a day for 2 weeks and then 4,000 a day (Patient taking differently: Take 5,000 Units by mouth daily) 60 capsule 5    DHEA 25 MG TABS Take 12.5 mg by mouth daily (Patient not taking: No sig reported)       No current facility-administered medications for this visit. Allergies   Allergen Reactions    Dynapen [Dicloxacillin] Other (See Comments)     Feeling of pins and needles       Subjective:   Review of Systems   Constitutional:  Negative for chills, fatigue and fever. HENT:  Negative for congestion, ear pain, postnasal drip, rhinorrhea and sore throat. Eyes:  Negative for discharge and redness. Respiratory:  Negative for cough and shortness of breath. Cardiovascular:  Negative for chest pain and leg swelling. Gastrointestinal:  Negative for abdominal distention, abdominal pain, anal bleeding, blood in stool, constipation, diarrhea and nausea. Skin:  Negative for color change and rash. Neurological:  Negative for facial asymmetry, speech difficulty and weakness. Hematological:  Does not bruise/bleed easily. Psychiatric/Behavioral:  Positive for dysphoric mood. Negative for agitation and confusion. The patient is nervous/anxious.       Objective:     Vitals:    03/14/23 0927   BP: 120/60   Pulse: 68   Resp: 16   Weight: 157 lb 9.6 oz (71.5 kg)       Body mass index is 29.78 kg/m². Wt Readings from Last 3 Encounters:   03/14/23 157 lb 9.6 oz (71.5 kg)   02/27/23 160 lb (72.6 kg)   11/22/22 157 lb (71.2 kg)     BP Readings from Last 3 Encounters:   03/14/23 120/60   02/27/23 104/66   11/22/22 120/64       Physical Exam  Constitutional:       General: She is not in acute distress. Appearance: Normal appearance. She is well-developed. She is not ill-appearing or diaphoretic. HENT:      Head: Normocephalic and atraumatic. Right Ear: Hearing and external ear normal. No decreased hearing noted. Left Ear: Hearing and external ear normal. No decreased hearing noted. Nose: Nose normal. No nasal deformity. Eyes:      General:         Right eye: No discharge. Left eye: No discharge. Conjunctiva/sclera: Conjunctivae normal.   Pulmonary:      Effort: Pulmonary effort is normal. No respiratory distress. Abdominal:      General: There is no distension. Tenderness: There is no guarding. Musculoskeletal:         General: No tenderness or deformity. Normal range of motion. Cervical back: Normal range of motion and neck supple. Skin:     Coloration: Skin is not pale. Findings: No erythema or rash (On exposed areas). Neurological:      General: No focal deficit present. Mental Status: She is alert and oriented to person, place, and time. Gait: Gait normal.   Psychiatric:         Mood and Affect: Mood normal.         Speech: Speech normal.         Behavior: Behavior normal.         Thought Content:  Thought content normal.         Judgment: Judgment normal.       Lab Results   Component Value Date    WBC 5.1 04/21/2022    HGB 14.0 04/21/2022    HCT 41.9 04/21/2022     (L) 04/21/2022    CHOL 226 (H) 05/04/2021    TRIG 72 05/04/2021    HDL 76 05/04/2021    LDLCALC 136 05/04/2021    AST 18 12/09/2021     04/21/2022    K 4.4 04/21/2022     04/21/2022    CREATININE 0.70 04/21/2022    BUN 13 04/21/2022    CO2 26 04/21/2022    TSH 0.983 12/09/2021    GLUF 67 (L) 05/04/2021    LABA1C 5.0 10/21/2015    LABGLOM >90 12/09/2021    MG 2.1 05/04/2021    CALCIUM 9.50 04/21/2022    VITD25 57 05/04/2021       :       Diagnosis Orders   1. Gastroesophageal reflux disease without esophagitis  omeprazole (PRILOSEC) 40 MG delayed release capsule      2. Anxiety  busPIRone (BUSPAR) 10 MG tablet    hydrOXYzine HCl (ATARAX) 10 MG tablet          :    Atarax 3 times daily as needed - if it makes you too sleepy we will consider a different medication    Refill prilosec - GERD symptom relief:  Weight loss  Avoid laying down for at least 2 hours after a meal  Eat frequent small meals  Avoid tobacco and alcohol  Diet: avoid fatty foods, caffeine, chocolate, spicy foods, carbonated beverages, peppermint, tomatoes, or any other known triggers    Back in 6 months    Return in about 6 months (around 9/14/2023). Placed:  No orders of the defined types were placed in this encounter. Medications Prescribed:  Orders Placed This Encounter   Medications    busPIRone (BUSPAR) 10 MG tablet     Sig: TAKE 1 TABLET BY MOUTH THREE TIMES A DAY     Dispense:  90 tablet     Refill:  5    omeprazole (PRILOSEC) 40 MG delayed release capsule     Sig: Take 1 capsule by mouth every morning (before breakfast)     Dispense:  90 capsule     Refill:  1    hydrOXYzine HCl (ATARAX) 10 MG tablet     Sig: Take 1 tablet by mouth 3 times daily as needed for Anxiety     Dispense:  90 tablet     Refill:  1          Discussed use,benefit, and side effects of prescribed medications. Barriers to medication complianceaddressed. All patient questions answered. Pt voiced understanding.      Electronicallysigned by MELANY Staton CNP on 3/14/2023 at 9:47 AM

## 2023-04-06 DIAGNOSIS — F41.9 ANXIETY: ICD-10-CM

## 2023-04-06 RX ORDER — HYDROXYZINE HYDROCHLORIDE 10 MG/1
TABLET, FILM COATED ORAL
Qty: 90 TABLET | Refills: 1 | OUTPATIENT
Start: 2023-04-06

## 2023-04-06 NOTE — TELEPHONE ENCOUNTER
Hydroxyzine refused due to being filled 3/14/23 #90/1 to [address] [facility],[address]. Pt does not need a refill at this time.

## 2023-04-27 ENCOUNTER — OFFICE VISIT (OUTPATIENT)
Dept: FAMILY MEDICINE CLINIC | Age: 33
End: 2023-04-27
Payer: COMMERCIAL

## 2023-04-27 VITALS
TEMPERATURE: 98.9 F | BODY MASS INDEX: 29.74 KG/M2 | HEART RATE: 80 BPM | WEIGHT: 157.4 LBS | SYSTOLIC BLOOD PRESSURE: 96 MMHG | DIASTOLIC BLOOD PRESSURE: 70 MMHG | RESPIRATION RATE: 16 BRPM

## 2023-04-27 DIAGNOSIS — J02.0 ACUTE STREPTOCOCCAL PHARYNGITIS: Primary | ICD-10-CM

## 2023-04-27 DIAGNOSIS — J02.9 SORE THROAT: ICD-10-CM

## 2023-04-27 PROCEDURE — 87880 STREP A ASSAY W/OPTIC: CPT | Performed by: NURSE PRACTITIONER

## 2023-04-27 PROCEDURE — 99213 OFFICE O/P EST LOW 20 MIN: CPT | Performed by: NURSE PRACTITIONER

## 2023-04-27 RX ORDER — AZITHROMYCIN 250 MG/1
TABLET, FILM COATED ORAL
Qty: 1 PACKET | Refills: 0 | Status: SHIPPED | OUTPATIENT
Start: 2023-04-27

## 2023-04-27 ASSESSMENT — ENCOUNTER SYMPTOMS: SORE THROAT: 1

## 2023-04-27 NOTE — PROGRESS NOTES
[facility] FAMILY MEDICINE  [address] [facility] [place] [address]  Dept: [phone]  Loc: [phone]      Visit Date: 4/27/2023    Catalina Miguel a 28 y.o. female who presents today for:   Chief Complaint   Patient presents with    Pharyngitis     Sore throat, body aches, fever, headache x 2 days. HPI:     Started 2 days ago with sore throat, fever, body aches, and headache.     HPI  Health Maintenance   Topic Date Due    Diabetic retinal exam  Never done    Cervical cancer screen  09/05/2020    COVID-19 Vaccine (3 - Booster for Pfizer series) 03/02/2022    Depression Monitoring  03/14/2024    DTaP/Tdap/Td vaccine (3 - Td or Tdap) 08/13/2029    Flu vaccine  Completed    Hepatitis C screen  Completed    HIV screen  Completed    Hepatitis A vaccine  Aged Out    Hib vaccine  Aged Out    Meningococcal (ACWY) vaccine  Aged Out    Pneumococcal 0-64 years Vaccine  Aged Out    Varicella vaccine  Discontinued       Current Outpatient Medications   Medication Sig Dispense Refill    azithromycin (ZITHROMAX) 250 MG tablet Take 2 tabs (500 mg) on Day 1, and take 1 tab (250 mg) on days 2 through 5. 1 packet 0    busPIRone (BUSPAR) 10 MG tablet TAKE 1 TABLET BY MOUTH THREE TIMES A DAY 90 tablet 5    omeprazole (PRILOSEC) 40 MG delayed release capsule Take 1 capsule by mouth every morning (before breakfast) 90 capsule 1    sertraline (ZOLOFT) 100 MG tablet TAKE 1 TABLET BY MOUTH EVERY DAY 90 tablet 1    Erenumab-aooe (AIMOVIG) 140 MG/ML SOAJ Inject 140 mg into the skin every 30 days 3 Adjustable Dose Pre-filled Pen Syringe 4    Ubrogepant (UBRELVY) 50 MG TABS TAKE 1 TABLET BY MOUTH AS NEEDED (HEADACHE) 10 tablet 4    fludrocortisone (FLORINEF) 0.1 MG tablet 1 tablet daily Every other day      metoprolol tartrate (LOPRESSOR) 25 MG tablet Take 1 tablet by mouth 2 times daily 0.5 tab bid      fluticasone (FLONASE) 50 MCG/ACT nasal spray SPRAY 2 SPRAYS INTO EACH NOSTRIL EVERY DAY 16 g 1

## 2023-05-01 DIAGNOSIS — R53.83 OTHER FATIGUE: ICD-10-CM

## 2023-05-01 DIAGNOSIS — R63.5 WEIGHT GAIN: Primary | ICD-10-CM

## 2023-05-02 ENCOUNTER — NURSE ONLY (OUTPATIENT)
Dept: LAB | Age: 33
End: 2023-05-02

## 2023-05-02 ENCOUNTER — TELEPHONE (OUTPATIENT)
Dept: FAMILY MEDICINE CLINIC | Age: 33
End: 2023-05-02

## 2023-05-02 DIAGNOSIS — R63.5 WEIGHT GAIN: ICD-10-CM

## 2023-05-02 LAB
CHOLEST SERPL-MCNC: 219 MG/DL (ref 100–199)
DEPRECATED MEAN GLUCOSE BLD GHB EST-ACNC: 96 MG/DL (ref 70–126)
GLUCOSE FASTING: 92 MG/DL (ref 70–108)
HBA1C MFR BLD HPLC: 5.2 % (ref 4.4–6.4)
HDLC SERPL-MCNC: 64 MG/DL
LDLC SERPL CALC-MCNC: 140 MG/DL
TRIGL SERPL-MCNC: 76 MG/DL (ref 0–199)

## 2023-05-02 NOTE — TELEPHONE ENCOUNTER
----- Message from MELANY Bianchi CNP sent at 5/2/2023 11:35 AM EDT -----  Cholesterol a little elevated at 219 - ldl 140:     Increase aerobic exercise to 30-40 minutes 3-4 times per week  Increase vegetables, fruits, poultry, fish, and low-fat dairy products  Avoid greasy, fatty, fried foods, sweets, sugar-sweetened beverages, and red meats  Reduce sodium in the diet    Fasting glucose is normal ranges  A1C normal at 5.2%

## 2023-06-08 DIAGNOSIS — F41.9 ANXIETY: ICD-10-CM

## 2023-06-08 RX ORDER — HYDROXYZINE HYDROCHLORIDE 10 MG/1
TABLET, FILM COATED ORAL
Qty: 90 TABLET | Refills: 1 | Status: SHIPPED | OUTPATIENT
Start: 2023-06-08

## 2023-06-08 NOTE — TELEPHONE ENCOUNTER
Request sent from [facility] pharmacy for refill of hydroxyzine 10 mg tid prn anxiety. Last seen 4/27/23, next appt 9/14/23. Verified with historical list.  Order pended and set to escribe.

## 2023-07-06 DIAGNOSIS — F41.9 ANXIETY: ICD-10-CM

## 2023-07-06 RX ORDER — HYDROXYZINE HYDROCHLORIDE 10 MG/1
TABLET, FILM COATED ORAL
Qty: 90 TABLET | Refills: 1 | OUTPATIENT
Start: 2023-07-06

## 2023-07-06 NOTE — TELEPHONE ENCOUNTER
Hydroxyzine refused due to being filled 6/8/23 #90/1 to [address]. Pt should contact the office when she needs the refill.

## 2023-07-25 NOTE — PATIENT INSTRUCTIONS
Continue with Aimovig 140 mg/ml monthly subcutaneous injection  Continue with Ubrelvy 50 mg as needed for breakthrough headache  Keep headache diary  Follow up in 12 months or sooner if needed. Call if any questions or concerns. Physical Therapy Visit        SUBJECTIVE                                                                                                               Feeling pretty good.  2 small episodes since last visit.--very random, not at computer was Saturday am and Sunday pm   --not associated with movement   Doing HEP --getting better, imbalanced with challenges but no dizziness    Function (patient/family/caregiver reported):   Functional Change: Consistent with no symptoms in shower.       OBJECTIVE                                                                                                                                    Treatment     Therapeutic Exercise  Unloaded cervical retractions x 10   Loaded cervical retractions x 10 x 2  Scapular squeezes x 10 x 2  Posture instruction and stress management --reviewed briefly     Neuromuscular Re-Education    Eyes closed standing neck rot, flex/ext 1x each x 1 minute narrow stance foam   Eyes open and  closed tandem stance neck flex and ext and rot x 2 min each  B   -foam tandem stance horizontal and vertical gaze eyes open--only R foot in back            Skilled input: as detailed above    Writer verbally educated and received verbal consent for hand placement, positioning of patient, and techniques to be performed today from patient for clothing adjustments for techniques, therapist position for techniques and hand placement and palpation for techniques as described above and how they are pertinent to the patient's plan of care.      ASSESSMENT                                                                                                            Tandem challenging especially with eyes closed as expected.  So far responding well to HEP as no symptoms at computer or shower this week so far.  Mild dizziness with interventions but able to recover with small breaks.  Likely ready for gait progression.     Education:   - Results of above outlined education: Verbalizes  understanding       Therapy procedure time and total treatment time can be found documented on the Time Entry flowsheet

## 2023-08-14 DIAGNOSIS — G43.109 MIGRAINE WITH AURA AND WITHOUT STATUS MIGRAINOSUS, NOT INTRACTABLE: ICD-10-CM

## 2023-08-14 RX ORDER — ERENUMAB-AOOE 140 MG/ML
140 INJECTION, SOLUTION SUBCUTANEOUS
Qty: 1 ADJUSTABLE DOSE PRE-FILLED PEN SYRINGE | Refills: 5 | Status: SHIPPED | OUTPATIENT
Start: 2023-08-14

## 2023-09-08 DIAGNOSIS — K21.9 GASTROESOPHAGEAL REFLUX DISEASE WITHOUT ESOPHAGITIS: ICD-10-CM

## 2023-09-08 DIAGNOSIS — F41.9 ANXIETY: ICD-10-CM

## 2023-09-08 RX ORDER — OMEPRAZOLE 40 MG/1
CAPSULE, DELAYED RELEASE ORAL
Qty: 90 CAPSULE | Refills: 3 | Status: SHIPPED | OUTPATIENT
Start: 2023-09-08

## 2023-09-08 RX ORDER — SERTRALINE HYDROCHLORIDE 100 MG/1
TABLET, FILM COATED ORAL
Qty: 90 TABLET | Refills: 3 | Status: SHIPPED | OUTPATIENT
Start: 2023-09-08

## 2023-09-08 RX ORDER — BUSPIRONE HYDROCHLORIDE 10 MG/1
TABLET ORAL
Qty: 270 TABLET | Refills: 3 | Status: SHIPPED | OUTPATIENT
Start: 2023-09-08

## 2023-09-14 ENCOUNTER — OFFICE VISIT (OUTPATIENT)
Dept: FAMILY MEDICINE CLINIC | Age: 33
End: 2023-09-14
Payer: COMMERCIAL

## 2023-09-14 VITALS
HEART RATE: 63 BPM | OXYGEN SATURATION: 96 % | SYSTOLIC BLOOD PRESSURE: 102 MMHG | DIASTOLIC BLOOD PRESSURE: 66 MMHG | BODY MASS INDEX: 30.26 KG/M2 | WEIGHT: 160.25 LBS | HEIGHT: 61 IN

## 2023-09-14 DIAGNOSIS — Z00.00 ENCOUNTER FOR WELL ADULT EXAM WITHOUT ABNORMAL FINDINGS: Primary | ICD-10-CM

## 2023-09-14 DIAGNOSIS — Z00.00 WELLNESS EXAMINATION: ICD-10-CM

## 2023-09-14 DIAGNOSIS — F41.9 ANXIETY: ICD-10-CM

## 2023-09-14 DIAGNOSIS — E78.2 MIXED HYPERLIPIDEMIA: ICD-10-CM

## 2023-09-14 PROCEDURE — 99395 PREV VISIT EST AGE 18-39: CPT | Performed by: NURSE PRACTITIONER

## 2023-09-14 RX ORDER — ELAGOLIX 200 MG/1
200 TABLET, FILM COATED ORAL 2 TIMES DAILY
COMMUNITY

## 2023-09-14 ASSESSMENT — ENCOUNTER SYMPTOMS
COUGH: 0
ABDOMINAL DISTENTION: 0
EYE REDNESS: 0
ANAL BLEEDING: 0
DIARRHEA: 0
SHORTNESS OF BREATH: 0
COLOR CHANGE: 0
CONSTIPATION: 0
RHINORRHEA: 0
ABDOMINAL PAIN: 0
EYE DISCHARGE: 0
NAUSEA: 0
BLOOD IN STOOL: 0
SORE THROAT: 0

## 2023-09-14 NOTE — PROGRESS NOTES
Well Adult Note  Name: Coretta Quevedo Date: 2023   MRN: [id number] Sex: Female   Age: 35 y.o. Ethnicity: Non- / Non    : 1990 Race: White (non-)      Lana Combs is here for well adult exam.  History:    Health Habits: With regard to her health habits, she eats 3 meals and 2 snacks per day. She does exercise regularly:   Physical activities include: walking, piliates , 3 times per week. She does take over the counter vitamins. She never had a blood transfusion or tattoo. She wears seatbelts while riding a car. She does not text or talk on the phone while driving. She performs all of her ADL's without problem. She is independent, she cooks, drives, bathes, and gets dressed without assistance. She is . She has 2 children. She does work - PRN. Health Maintenance   Topic Date Due    Hepatitis B vaccine (1 of 3 - 3-dose series) Never done    Cervical cancer screen  2018    Flu vaccine (1) 2023    COVID-19 Vaccine (3 - Pfizer series) 2028 (Originally 3/2/2022)    Depression Monitoring  2024    DTaP/Tdap/Td vaccine (3 - Td or Tdap) 2029    Hepatitis C screen  Completed    HIV screen  Completed    Hepatitis A vaccine  Aged Out    Hib vaccine  Aged Out    HPV vaccine  Aged Out    Meningococcal (ACWY) vaccine  Aged Out    Pneumococcal 0-64 years Vaccine  Aged Out    Varicella vaccine  Discontinued     She  reports that she has never smoked. She has never used smokeless tobacco. She reports current alcohol use of about 2.0 standard drinks of alcohol per week. She reports that she does not use drugs. . Her last pap smear was  - [facility] to [facility] - 10/9/2023 scheduled for laparoscopic procedure to check for endometriosis. Her last menstrual cycle was 1month  ago. She is sexually active. She has had the same sexual partner for the last 14 years. She does perform regular breast self exams.      Review of Systems

## 2023-09-14 NOTE — PATIENT INSTRUCTIONS
need to work with these providers for several weeks or months before surgery. The nutritionist will explain what and how much you will be able to eat after surgery. You may also need to lose a small amount of weight before surgery. The mental health specialist will help you to cope with stress and other factors that can make it harder to lose weight or trigger you to eat   The medical doctor will determine whether you need other tests, counseling, or treatment before surgery. He or she might also help you begin a medical weight loss program so that you can lose some weight before surgery. The bariatric surgeon will meet with you to discuss the surgeries available to treat obesity. He or she will also make sure you are a good candidate for surgery. TYPES OF WEIGHT LOSS SURGERY -- There are several types of weight loss surgeries, the most common being lap banding, gastric bypass, and gastric sleeve. Lap banding -- Laparoscopic adjustable gastric banding (LAGB), or lap banding, is a surgery that uses an adjustable band around the opening to the stomach (figure 1). This reduces the amount of food that you can eat at one time. Lap banding is done through small incisions, with a laparoscope. The band can be adjusted after surgery, allowing you to eat more or less food. Adjustments to the size and tightness of the band are made by using a needle to add or remove fluid from a port (a small container under the skin that is connected to the band). Adding fluid to the band makes it tighter which restricts the amount of food you can eat and may help you to lose more weight. Lap banding is a popular choice because it is relatively simple to perform, can be adjusted or removed, and has a low risk of serious complications immediately after surgery. However, weight loss with the lap band depends on your ability to follow the program closely.   You will need to prepare nutritious meals that \"work with\" the band, not against

## 2023-10-02 NOTE — PROGRESS NOTES
NPO after midnight  [employer] and drivers license  Wear comfortable clean clothing  Do not bring jewelry  Shower night before and morning of surgery with a liquid antibacterial soap  Bring list of medications with dosage and how often taken  Follow all instructions given by your physician   needed at discharge  Please limit to 2 visitors for surgery  You must have a responsible adult with you day of surgery and for 24 hours after surgery  Call [phone] for any questions

## 2023-10-02 NOTE — PROGRESS NOTES
In preparation for their surgical procedure above patient was screened for Obstructive Sleep Apnea (ABA) using the STOP-Bang Questionnaire by the Pre-Admission Testing department. This is a pre-surgical screening tool for patient safety and serves as a recommendation, this WILL NOT cause cancellation of surgery. STOP-Bang Questionnaire  * Do you currently see a pulmonologist?  No     If yes STOP, do not complete. Patient follows with  .    1. Do you snore loudly (able to be heard in the next room)? No    2. Do you often feel tired or sleepy during the daytime? No       3. Has anyone ever told you that you stop breathing during your sleep? No    4. Do you have or are you being treated for high blood pressure? Yes      5. BMI more than 35? BMI (Calculated): 29.4        No    6. Age over 48 years? 35 y.o. No    7. Neck Circumference greater than 17 inches for male or 16 inches for female? Measured           (visits only)            No    8. Gender Male? No      TOTAL SCORE: 1    ABA - Low Risk : Yes to 0 - 2 questions  ABA - Intermediate Risk : Yes to 3 - 4 questions  ABA - High Risk : Yes to 5 - 8 questions    Adapted from:   STOP Questionnaire: A Tool to Screen Patients for Obstructive Sleep Apnea   JOSELITO Suarez.P.C., Edu Richardson M.B.B.S., Estle Hodgkin, M.D., Shayan Scherer. Carmelo Felty, Ph.D., JAYLEN Daniels.B.B.S., Columba Jeffry, M.Sc., Song Galvez M.D., Blanche Carey. JOSELITO Mohr.P.C.    Anesthesiology 2008; 190:615-49 Copyright 2008, the [place] of [employer], [address] [facility].   ----------------------------------------------------------------------------------------------------------------

## 2023-10-06 ENCOUNTER — HOSPITAL ENCOUNTER (OUTPATIENT)
Age: 33
Discharge: HOME | End: 2023-10-06
Payer: COMMERCIAL

## 2023-10-06 LAB
ANION GAP SERPL CALC-SCNC: 13 MEQ/L (ref 8–16)
BASOPHILS ABSOLUTE: 0 THOU/MM3 (ref 0–0.1)
BASOPHILS NFR BLD AUTO: 0.6 %
BUN SERPL-MCNC: 16 MG/DL (ref 7–22)
CALCIUM SERPL-MCNC: 9.7 MG/DL (ref 8.5–10.5)
CHLORIDE SERPL-SCNC: 101 MEQ/L (ref 98–111)
CO2 SERPL-SCNC: 25 MEQ/L (ref 23–33)
CREAT SERPL-MCNC: 0.8 MG/DL (ref 0.4–1.2)
DEPRECATED RDW RBC AUTO: 44 FL (ref 35–45)
EOSINOPHIL NFR BLD AUTO: 5.6 %
EOSINOPHILS ABSOLUTE: 0.3 THOU/MM3 (ref 0–0.4)
ERYTHROCYTE [DISTWIDTH] IN BLOOD BY AUTOMATED COUNT: 12.1 % (ref 11.5–14.5)
GFR SERPL CREATININE-BSD FRML MDRD: > 60 ML/MIN/1.73M2
GLUCOSE SERPL-MCNC: 117 MG/DL (ref 70–108)
HCT VFR BLD AUTO: 42.5 % (ref 37–47)
HGB BLD-MCNC: 13.8 GM/DL (ref 12–16)
IMM GRANULOCYTES # BLD AUTO: 0.01 THOU/MM3 (ref 0–0.07)
IMM GRANULOCYTES NFR BLD AUTO: 0.2 %
LYMPHOCYTES ABSOLUTE: 1.4 THOU/MM3 (ref 1–4.8)
LYMPHOCYTES NFR BLD AUTO: 26 %
MCH RBC QN AUTO: 31.7 PG (ref 26–33)
MCHC RBC AUTO-ENTMCNC: 32.5 GM/DL (ref 32.2–35.5)
MCV RBC AUTO: 97.7 FL (ref 81–99)
MONOCYTES ABSOLUTE: 0.3 THOU/MM3 (ref 0.4–1.3)
MONOCYTES NFR BLD AUTO: 5.6 %
NEUTROPHILS NFR BLD AUTO: 62 %
NRBC BLD AUTO-RTO: 0 /100 WBC
PLATELET # BLD AUTO: 155 THOU/MM3 (ref 130–400)
PMV BLD AUTO: 12.7 FL (ref 9.4–12.4)
POTASSIUM SERPL-SCNC: 4.2 MEQ/L (ref 3.5–5.2)
RBC # BLD AUTO: 4.35 MILL/MM3 (ref 4.2–5.4)
SEGMENTED NEUTROPHILS ABSOLUTE COUNT: 3.2 THOU/MM3 (ref 1.8–7.7)
SODIUM SERPL-SCNC: 139 MEQ/L (ref 135–145)
WBC # BLD AUTO: 5.2 THOU/MM3 (ref 4.8–10.8)

## 2023-10-06 PROCEDURE — 93010 ELECTROCARDIOGRAM REPORT: CPT | Performed by: INTERNAL MEDICINE

## 2023-10-06 PROCEDURE — 85025 COMPLETE CBC W/AUTO DIFF WBC: CPT

## 2023-10-06 PROCEDURE — 93005 ELECTROCARDIOGRAM TRACING: CPT | Performed by: OBSTETRICS & GYNECOLOGY

## 2023-10-06 PROCEDURE — 36415 COLL VENOUS BLD VENIPUNCTURE: CPT

## 2023-10-06 PROCEDURE — 80048 BASIC METABOLIC PNL TOTAL CA: CPT

## 2023-10-07 ENCOUNTER — ANESTHESIA EVENT (OUTPATIENT)
Dept: OPERATING ROOM | Age: 33
End: 2023-10-07
Payer: COMMERCIAL

## 2023-10-07 LAB
EKG ATRIAL RATE: 61 BPM
EKG P AXIS: 23 DEGREES
EKG P-R INTERVAL: 154 MS
EKG Q-T INTERVAL: 408 MS
EKG QRS DURATION: 76 MS
EKG QTC CALCULATION (BAZETT): 410 MS
EKG R AXIS: 6 DEGREES
EKG T AXIS: 15 DEGREES
EKG VENTRICULAR RATE: 61 BPM

## 2023-10-08 ENCOUNTER — PREP FOR PROCEDURE (OUTPATIENT)
Dept: OBGYN | Age: 33
End: 2023-10-08

## 2023-10-08 RX ORDER — SODIUM CHLORIDE 0.9 % (FLUSH) 0.9 %
5-40 SYRINGE (ML) INJECTION EVERY 12 HOURS SCHEDULED
Status: CANCELLED | OUTPATIENT
Start: 2023-10-08

## 2023-10-08 RX ORDER — ONDANSETRON 2 MG/ML
8 INJECTION INTRAMUSCULAR; INTRAVENOUS EVERY 8 HOURS PRN
Status: CANCELLED | OUTPATIENT
Start: 2023-10-08

## 2023-10-08 RX ORDER — SODIUM CHLORIDE, SODIUM LACTATE, POTASSIUM CHLORIDE, CALCIUM CHLORIDE 600; 310; 30; 20 MG/100ML; MG/100ML; MG/100ML; MG/100ML
INJECTION, SOLUTION INTRAVENOUS CONTINUOUS
Status: CANCELLED | OUTPATIENT
Start: 2023-10-08

## 2023-10-08 RX ORDER — SODIUM CHLORIDE 0.9 % (FLUSH) 0.9 %
5-40 SYRINGE (ML) INJECTION PRN
Status: CANCELLED | OUTPATIENT
Start: 2023-10-08

## 2023-10-08 RX ORDER — SODIUM CHLORIDE 9 MG/ML
INJECTION, SOLUTION INTRAVENOUS PRN
Status: CANCELLED | OUTPATIENT
Start: 2023-10-08

## 2023-10-09 ENCOUNTER — ANESTHESIA (OUTPATIENT)
Dept: OPERATING ROOM | Age: 33
End: 2023-10-09
Payer: COMMERCIAL

## 2023-10-09 ENCOUNTER — HOSPITAL ENCOUNTER (OUTPATIENT)
Age: 33
Setting detail: OUTPATIENT SURGERY
Discharge: HOME | End: 2023-10-09
Attending: OBSTETRICS & GYNECOLOGY | Admitting: OBSTETRICS & GYNECOLOGY
Payer: COMMERCIAL

## 2023-10-09 VITALS
WEIGHT: 157.6 LBS | TEMPERATURE: 96.9 F | BODY MASS INDEX: 29.76 KG/M2 | HEART RATE: 67 BPM | RESPIRATION RATE: 12 BRPM | DIASTOLIC BLOOD PRESSURE: 58 MMHG | SYSTOLIC BLOOD PRESSURE: 96 MMHG | HEIGHT: 61 IN | OXYGEN SATURATION: 95 %

## 2023-10-09 DIAGNOSIS — R10.2 PELVIC PAIN: Primary | ICD-10-CM

## 2023-10-09 PROBLEM — Z34.90 ENCOUNTER FOR ELECTIVE INDUCTION OF LABOR: Status: RESOLVED | Noted: 2019-09-03 | Resolved: 2023-10-09

## 2023-10-09 LAB — PREGNANCY, URINE: NEGATIVE

## 2023-10-09 PROCEDURE — 2720000010 HC SURG SUPPLY STERILE: Performed by: OBSTETRICS & GYNECOLOGY

## 2023-10-09 PROCEDURE — 7100000011 HC PHASE II RECOVERY - ADDTL 15 MIN: Performed by: OBSTETRICS & GYNECOLOGY

## 2023-10-09 PROCEDURE — 7100000010 HC PHASE II RECOVERY - FIRST 15 MIN: Performed by: OBSTETRICS & GYNECOLOGY

## 2023-10-09 PROCEDURE — 2580000003 HC RX 258: Performed by: NURSE ANESTHETIST, CERTIFIED REGISTERED

## 2023-10-09 PROCEDURE — 3700000001 HC ADD 15 MINUTES (ANESTHESIA): Performed by: OBSTETRICS & GYNECOLOGY

## 2023-10-09 PROCEDURE — 2709999900 HC NON-CHARGEABLE SUPPLY: Performed by: OBSTETRICS & GYNECOLOGY

## 2023-10-09 PROCEDURE — 7100000000 HC PACU RECOVERY - FIRST 15 MIN: Performed by: OBSTETRICS & GYNECOLOGY

## 2023-10-09 PROCEDURE — 3600000013 HC SURGERY LEVEL 3 ADDTL 15MIN: Performed by: OBSTETRICS & GYNECOLOGY

## 2023-10-09 PROCEDURE — 6360000002 HC RX W HCPCS: Performed by: ANESTHESIOLOGY

## 2023-10-09 PROCEDURE — 6360000002 HC RX W HCPCS: Performed by: OBSTETRICS & GYNECOLOGY

## 2023-10-09 PROCEDURE — 6370000000 HC RX 637 (ALT 250 FOR IP): Performed by: OBSTETRICS & GYNECOLOGY

## 2023-10-09 PROCEDURE — 6360000002 HC RX W HCPCS: Performed by: NURSE ANESTHETIST, CERTIFIED REGISTERED

## 2023-10-09 PROCEDURE — 3700000000 HC ANESTHESIA ATTENDED CARE: Performed by: OBSTETRICS & GYNECOLOGY

## 2023-10-09 PROCEDURE — 2500000003 HC RX 250 WO HCPCS: Performed by: NURSE ANESTHETIST, CERTIFIED REGISTERED

## 2023-10-09 PROCEDURE — 3600000003 HC SURGERY LEVEL 3 BASE: Performed by: OBSTETRICS & GYNECOLOGY

## 2023-10-09 PROCEDURE — 81025 URINE PREGNANCY TEST: CPT

## 2023-10-09 PROCEDURE — 7100000001 HC PACU RECOVERY - ADDTL 15 MIN: Performed by: OBSTETRICS & GYNECOLOGY

## 2023-10-09 RX ORDER — ONDANSETRON 2 MG/ML
8 INJECTION INTRAMUSCULAR; INTRAVENOUS EVERY 8 HOURS PRN
Status: DISCONTINUED | OUTPATIENT
Start: 2023-10-09 | End: 2023-10-09 | Stop reason: HOSPADM

## 2023-10-09 RX ORDER — HYDROCODONE BITARTRATE AND ACETAMINOPHEN 5; 325 MG/1; MG/1
1 TABLET ORAL ONCE
Status: COMPLETED | OUTPATIENT
Start: 2023-10-09 | End: 2023-10-09

## 2023-10-09 RX ORDER — BUPIVACAINE HYDROCHLORIDE 5 MG/ML
INJECTION, SOLUTION EPIDURAL; INTRACAUDAL PRN
Status: DISCONTINUED | OUTPATIENT
Start: 2023-10-09 | End: 2023-10-09 | Stop reason: ALTCHOICE

## 2023-10-09 RX ORDER — OXYCODONE HYDROCHLORIDE 5 MG/1
10 TABLET ORAL EVERY 4 HOURS PRN
Status: CANCELLED | OUTPATIENT
Start: 2023-10-09

## 2023-10-09 RX ORDER — SODIUM CHLORIDE, SODIUM LACTATE, POTASSIUM CHLORIDE, CALCIUM CHLORIDE 600; 310; 30; 20 MG/100ML; MG/100ML; MG/100ML; MG/100ML
INJECTION, SOLUTION INTRAVENOUS CONTINUOUS
Status: DISCONTINUED | OUTPATIENT
Start: 2023-10-09 | End: 2023-10-09 | Stop reason: HOSPADM

## 2023-10-09 RX ORDER — SODIUM CHLORIDE 9 MG/ML
INJECTION, SOLUTION INTRAVENOUS PRN
Status: CANCELLED | OUTPATIENT
Start: 2023-10-09

## 2023-10-09 RX ORDER — SODIUM CHLORIDE 9 MG/ML
INJECTION, SOLUTION INTRAVENOUS PRN
Status: DISCONTINUED | OUTPATIENT
Start: 2023-10-09 | End: 2023-10-09 | Stop reason: HOSPADM

## 2023-10-09 RX ORDER — SODIUM CHLORIDE 9 MG/ML
INJECTION, SOLUTION INTRAVENOUS CONTINUOUS PRN
Status: DISCONTINUED | OUTPATIENT
Start: 2023-10-09 | End: 2023-10-09 | Stop reason: SDUPTHER

## 2023-10-09 RX ORDER — DEXAMETHASONE SODIUM PHOSPHATE 4 MG/ML
INJECTION, SOLUTION INTRA-ARTICULAR; INTRALESIONAL; INTRAMUSCULAR; INTRAVENOUS; SOFT TISSUE PRN
Status: DISCONTINUED | OUTPATIENT
Start: 2023-10-09 | End: 2023-10-09 | Stop reason: SDUPTHER

## 2023-10-09 RX ORDER — PROPOFOL 10 MG/ML
INJECTION, EMULSION INTRAVENOUS PRN
Status: DISCONTINUED | OUTPATIENT
Start: 2023-10-09 | End: 2023-10-09 | Stop reason: SDUPTHER

## 2023-10-09 RX ORDER — SODIUM CHLORIDE 0.9 % (FLUSH) 0.9 %
5-40 SYRINGE (ML) INJECTION EVERY 12 HOURS SCHEDULED
Status: DISCONTINUED | OUTPATIENT
Start: 2023-10-09 | End: 2023-10-09 | Stop reason: HOSPADM

## 2023-10-09 RX ORDER — SODIUM CHLORIDE 0.9 % (FLUSH) 0.9 %
5-40 SYRINGE (ML) INJECTION PRN
Status: DISCONTINUED | OUTPATIENT
Start: 2023-10-09 | End: 2023-10-09 | Stop reason: HOSPADM

## 2023-10-09 RX ORDER — MORPHINE SULFATE 2 MG/ML
4 INJECTION, SOLUTION INTRAMUSCULAR; INTRAVENOUS
Status: CANCELLED | OUTPATIENT
Start: 2023-10-09

## 2023-10-09 RX ORDER — SODIUM CHLORIDE, SODIUM LACTATE, POTASSIUM CHLORIDE, CALCIUM CHLORIDE 600; 310; 30; 20 MG/100ML; MG/100ML; MG/100ML; MG/100ML
INJECTION, SOLUTION INTRAVENOUS SEE ADMIN INSTRUCTIONS
Status: CANCELLED | OUTPATIENT
Start: 2023-10-09

## 2023-10-09 RX ORDER — MIDAZOLAM HYDROCHLORIDE 1 MG/ML
INJECTION INTRAMUSCULAR; INTRAVENOUS PRN
Status: DISCONTINUED | OUTPATIENT
Start: 2023-10-09 | End: 2023-10-09 | Stop reason: SDUPTHER

## 2023-10-09 RX ORDER — LIDOCAINE HYDROCHLORIDE 20 MG/ML
INJECTION, SOLUTION EPIDURAL; INFILTRATION; INTRACAUDAL; PERINEURAL PRN
Status: DISCONTINUED | OUTPATIENT
Start: 2023-10-09 | End: 2023-10-09 | Stop reason: SDUPTHER

## 2023-10-09 RX ORDER — OXYCODONE HYDROCHLORIDE 5 MG/1
5 TABLET ORAL EVERY 4 HOURS PRN
Status: CANCELLED | OUTPATIENT
Start: 2023-10-09

## 2023-10-09 RX ORDER — SODIUM CHLORIDE 0.9 % (FLUSH) 0.9 %
5-40 SYRINGE (ML) INJECTION PRN
Status: CANCELLED | OUTPATIENT
Start: 2023-10-09

## 2023-10-09 RX ORDER — FENTANYL CITRATE 50 UG/ML
25 INJECTION, SOLUTION INTRAMUSCULAR; INTRAVENOUS EVERY 5 MIN PRN
Status: DISCONTINUED | OUTPATIENT
Start: 2023-10-09 | End: 2023-10-09 | Stop reason: HOSPADM

## 2023-10-09 RX ORDER — HYDROCODONE BITARTRATE AND ACETAMINOPHEN 5; 325 MG/1; MG/1
1 TABLET ORAL EVERY 6 HOURS PRN
Qty: 20 TABLET | Refills: 0 | Status: SHIPPED | OUTPATIENT
Start: 2023-10-09 | End: 2023-10-16

## 2023-10-09 RX ORDER — FENTANYL CITRATE 50 UG/ML
INJECTION, SOLUTION INTRAMUSCULAR; INTRAVENOUS PRN
Status: DISCONTINUED | OUTPATIENT
Start: 2023-10-09 | End: 2023-10-09 | Stop reason: SDUPTHER

## 2023-10-09 RX ORDER — SODIUM CHLORIDE 0.9 % (FLUSH) 0.9 %
5-40 SYRINGE (ML) INJECTION EVERY 12 HOURS SCHEDULED
Status: CANCELLED | OUTPATIENT
Start: 2023-10-09

## 2023-10-09 RX ORDER — ACETAMINOPHEN 500 MG
1000 TABLET ORAL EVERY 8 HOURS PRN
Status: CANCELLED | OUTPATIENT
Start: 2023-10-09

## 2023-10-09 RX ORDER — ONDANSETRON 2 MG/ML
4 INJECTION INTRAMUSCULAR; INTRAVENOUS EVERY 6 HOURS PRN
Status: CANCELLED | OUTPATIENT
Start: 2023-10-09

## 2023-10-09 RX ORDER — MORPHINE SULFATE 2 MG/ML
2 INJECTION, SOLUTION INTRAMUSCULAR; INTRAVENOUS
Status: CANCELLED | OUTPATIENT
Start: 2023-10-09

## 2023-10-09 RX ORDER — FENTANYL CITRATE 50 UG/ML
50 INJECTION, SOLUTION INTRAMUSCULAR; INTRAVENOUS EVERY 5 MIN PRN
Status: COMPLETED | OUTPATIENT
Start: 2023-10-09 | End: 2023-10-09

## 2023-10-09 RX ORDER — ROCURONIUM BROMIDE 10 MG/ML
INJECTION, SOLUTION INTRAVENOUS PRN
Status: DISCONTINUED | OUTPATIENT
Start: 2023-10-09 | End: 2023-10-09 | Stop reason: SDUPTHER

## 2023-10-09 RX ORDER — ONDANSETRON 4 MG/1
4 TABLET, ORALLY DISINTEGRATING ORAL EVERY 8 HOURS PRN
Status: CANCELLED | OUTPATIENT
Start: 2023-10-09

## 2023-10-09 RX ORDER — ONDANSETRON 2 MG/ML
INJECTION INTRAMUSCULAR; INTRAVENOUS PRN
Status: DISCONTINUED | OUTPATIENT
Start: 2023-10-09 | End: 2023-10-09 | Stop reason: SDUPTHER

## 2023-10-09 RX ADMIN — FENTANYL CITRATE 50 MCG: 50 INJECTION, SOLUTION INTRAMUSCULAR; INTRAVENOUS at 07:35

## 2023-10-09 RX ADMIN — LIDOCAINE HYDROCHLORIDE 100 MG: 20 INJECTION, SOLUTION EPIDURAL; INFILTRATION; INTRACAUDAL; PERINEURAL at 07:35

## 2023-10-09 RX ADMIN — PROPOFOL 150 MG: 10 INJECTION, EMULSION INTRAVENOUS at 07:35

## 2023-10-09 RX ADMIN — FENTANYL CITRATE 50 MCG: 50 INJECTION, SOLUTION INTRAMUSCULAR; INTRAVENOUS at 08:21

## 2023-10-09 RX ADMIN — SUGAMMADEX 200 MG: 100 INJECTION, SOLUTION INTRAVENOUS at 08:28

## 2023-10-09 RX ADMIN — FENTANYL CITRATE 50 MCG: 50 INJECTION, SOLUTION INTRAMUSCULAR; INTRAVENOUS at 08:53

## 2023-10-09 RX ADMIN — ONDANSETRON 4 MG: 2 INJECTION INTRAMUSCULAR; INTRAVENOUS at 07:35

## 2023-10-09 RX ADMIN — DEXAMETHASONE SODIUM PHOSPHATE 8 MG: 4 INJECTION, SOLUTION INTRAMUSCULAR; INTRAVENOUS at 07:35

## 2023-10-09 RX ADMIN — FENTANYL CITRATE 50 MCG: 50 INJECTION, SOLUTION INTRAMUSCULAR; INTRAVENOUS at 08:45

## 2023-10-09 RX ADMIN — ROCURONIUM BROMIDE 50 MG: 10 INJECTION INTRAVENOUS at 07:35

## 2023-10-09 RX ADMIN — SODIUM CHLORIDE: 9 INJECTION, SOLUTION INTRAVENOUS at 07:32

## 2023-10-09 RX ADMIN — MIDAZOLAM 2 MG: 1 INJECTION INTRAMUSCULAR; INTRAVENOUS at 07:33

## 2023-10-09 RX ADMIN — HYDROCODONE BITARTRATE AND ACETAMINOPHEN 1 TABLET: 5; 325 TABLET ORAL at 09:38

## 2023-10-09 ASSESSMENT — PAIN DESCRIPTION - PAIN TYPE
TYPE: SURGICAL PAIN

## 2023-10-09 ASSESSMENT — PAIN SCALES - GENERAL
PAINLEVEL_OUTOF10: 4
PAINLEVEL_OUTOF10: 7
PAINLEVEL_OUTOF10: 7
PAINLEVEL_OUTOF10: 5

## 2023-10-09 ASSESSMENT — PAIN DESCRIPTION - ORIENTATION
ORIENTATION: LOWER

## 2023-10-09 ASSESSMENT — PAIN DESCRIPTION - LOCATION
LOCATION: ABDOMEN

## 2023-10-09 ASSESSMENT — PAIN DESCRIPTION - DESCRIPTORS
DESCRIPTORS: SHARP
DESCRIPTORS: ACHING
DESCRIPTORS: SHARP
DESCRIPTORS: SORE;SHARP
DESCRIPTORS: ACHING

## 2023-10-09 ASSESSMENT — PAIN - FUNCTIONAL ASSESSMENT
PAIN_FUNCTIONAL_ASSESSMENT: PREVENTS OR INTERFERES SOME ACTIVE ACTIVITIES AND ADLS
PAIN_FUNCTIONAL_ASSESSMENT: 0-10

## 2023-10-09 NOTE — H&P
[place] [id number]                       PREOPERATIVE HISTORY AND PHYSICAL    PATIENT NAME: Leslye Pedro                      :        1990  MED REC NO:   [id number]                           ROOM:  ACCOUNT NO:   [de-identified]                           ADMIT DATE: 10/09/2023  PROVIDER:     Malissa Rodriguez. Ever Burt M.D.    Sharathchanning Pattersoner: 10/09/2023    CHIEF COMPLAINT:  Pelvic pain. HISTORY OF PRESENT ILLNESS:  The patient is a 51-year-old G2,P2, who has  had increasing pelvic pain over the past year. She reports pelvic pain  and lower back pain, worse with ambulation, menses, and during  intercourse. She does not tolerate oral contraceptives with migraines  and has tried [place] without success and desires surgical  intervention. We will plan with laparoscopy with lysis of adhesions  and/or cautery of endometriosis. Her pain is bilateral, but left  greater than right. PAST MEDICAL HISTORY:  Anxiety disorder, depression, migraine,  polycystic ovaries. PAST SURGICAL HISTORY:  Sinus surgery in , tonsillectomy in ,  and wisdom teeth in . MEDICATIONS:  Aimovig, buspirone, Claritin, metoprolol, multivitamin,  omeprazole, sertraline, Ubrelvy, vitamin D. ALLERGIES:  DICLOXACILLIN. FAMILY MEDICAL HISTORY:  Endometriosis, heart disease, hypertension, and  hypercholesterolemia. REPRODUCTIVE HISTORY:  Two vaginal deliveries in  and . SOCIAL HISTORY:  The patient does not use illegal drugs. No tobacco.   Occasionally uses alcohol, and does drink caffeine daily. She is  employed and . PHYSICAL EXAMINATION:  VITAL SIGNS:  Height 5 feet 2 inches, weight 159. GENERAL:  Well-developed, well-nourished white female, in no acute  distress. HEENT:  Benign. LUNGS:  Clear. HEART:  Regular. ABDOMEN:  Soft and nontender. EXTREMITIES:  No clubbing, cyanosis, or edema.   NEUROLOGIC:  Grossly

## 2023-10-09 NOTE — DISCHARGE INSTRUCTIONS
DISCHARGE INSTRUCTIONS  Laparoscopy  Dr. Johnny Mullins Today  Do Not Consume Alcohol for 48 hours  Resume prescription medications as instructed  You should have someone with you tonight at home. DIET  Start with liquids - drink extra fluids the next 24-48 hours. Progress to soft diet as able    ACTIVITY  Rest for the remainder of the day  May begin to drive after 24 hours  May begin to lift over 10 lbs. after 24 hours  May return to normal activities and work after 48 hours    [address]  May shower or bathe  Cleanse abdominal incisions with alcohol three times per day (3 x day)  No Tampons or intercourse until after 14 days  You should expect vaginal drainage for 7-10 days  Throat lozenges or spray will help sore throat    SPECIAL INSTRUCTIONS / MEDICATIONS:          CALL MY OFFICE FOR:  Heavy bleeding  Pain not relieved by medication  Foul smelling vaginal drainage  Redness, swelling, or pus from incisions    IF YOU NEED MEDICAL ATTENTION CALL YOUR PHYSICIAN AT HIS OFFICE, OR LEAVE A MESSAGE WITH THE ANSWERING SERVICE, OR GO TO THE NEAREST EMERGENCY ROOM.     I ACKNOWLEDGE RECEIVING THESE INSTRUCTIONS AND UNDERSTAND THEM

## 2023-10-09 NOTE — PROGRESS NOTES
3186 Patient to PACU from surgery. Report from [address] Patient is drowsy but oriented and appropriate. Vitals stable on room air, NSR on continuous telemetry. 3 incision on lower/lower left abdomen-all with band aids covering. No drainage noted x3. Frances pad in place with no drainage noted. SCDs applied. Warming device applied for patient comfort. 0840 Patient shivering has subsided. Vitals remain stable on room air. 0845 Pain medication given per pain scale. 2622 Medication given per pain scale-2nd dose. 6944 Vitals remain stable on room air.   0900 Patient resting comfortably in bed at this time. [address] remain stable on room air with no complications. 0910 3 Abdominal incision all clean dry intact. Frances pad with no drainage noted. 0303 Patient meets criteria to move to phase 2,  at bedside. Vitals remained stable. Warming device applied per patient preference. Call light in reach. Snack and drink given per preference. 7779 Oral pain medication given. Patient resting in bed.  0945 [facility] pharmacy called to verify patient's medication can be picked up on the way home. 5371 Patient resting comfortably in bed with eyes closed. States she feels ready for discharge instructions. 1008 AVS discussed with patient and spouse, all questions answered at this time. IV taken out and dressing applied with no complications. 1015 Patient walked to bathroom, gait steady and even. 1025 Patient discharged in stable condition. Discharged with AVS and all belongings. All questions answered at this time. Patient discharged with all belongings.

## 2023-10-09 NOTE — ANESTHESIA PRE PROCEDURE
09/03/2019       Drug/Infectious Status (If Applicable):  Lab Results   Component Value Date/Time    HEPCAB Negative 11/03/2022 12:20 PM       COVID-19 Screening (If Applicable): No results found for: \"COVID19\"        Anesthesia Evaluation  Patient summary reviewed  Airway: Mallampati: II  TM distance: >3 FB   Neck ROM: full  Mouth opening: > = 3 FB   Dental:          Pulmonary:                              Cardiovascular:                      Neuro/Psych:   (+) headaches:, psychiatric history:            GI/Hepatic/Renal:             Endo/Other:                     Abdominal:             Vascular: Other Findings:           Anesthesia Plan      general     ASA 2       Induction: intravenous. MIPS: Postoperative opioids intended and Prophylactic antiemetics administered. Anesthetic plan and risks discussed with patient and spouse. Plan discussed with CRNA. Jose Sanderson.  DO Stefanie   10/9/2023

## 2023-10-09 NOTE — H&P
[address]   History and Physical Update    Pt Name: Marc Eduardo  MRN: [id number]  YOB: 1990  Date of evaluation: 10/9/2023    [x] I have examined the patient and reviewed the H&P/Consult and there are no changes to the patient or plans. [] I have examined the patient and reviewed the H&P/Consult and have noted the following changes:       Discussion with the patient and/ or family for proposed care, treatment, services; benefits, risks, side effects; likelihood of achieving goals and potential problems that may occur during recuperation was had and all questions were answered. Discussion with the patient and/ or family of reasonable alternatives to the proposed care, treatment, services and the discussion of the risks, benefits, side effects related to the alternatives and the risk related to not receiving the proposed care treatment services was also had and all questions were answered. If this is for an elective surgical procedure then The patient was counseled at length about the risks of remberto Covid-19 during their perioperative period and any recovery window from their procedure. The patient was made aware that remberto Covid-19  may worsen their prognosis for recovering from their procedure  and lend to a higher morbidity and/or mortality risk. All material risks, benefits, and reasonable alternatives including postponing the procedure were discussed. The patient  does wish to proceed with the procedure at this time.              Juan Dunaway MD,MD  Electronically signed 10/9/2023 at 7:29 AM

## 2023-10-10 NOTE — OP NOTE
[place] [address]                                OPERATIVE REPORT    PATIENT NAME: Amelia Adam                      :        1990  MED REC NO:   [id number]                           ROOM:  ACCOUNT NO:   [de-identified]                           ADMIT DATE: 10/09/2023  PROVIDER:     Amelia Green. CHRISTIANO Seth Brenda:  10/09/2023    PREOPERATIVE DIAGNOSIS:  Pelvic pain. POSTOPERATIVE DIAGNOSES:  Endometriosis and minimal adhesive disease. PROCEDURES:  Diagnostic laparoscopy, cautery of endometriosis, lysis of  adhesions. ESTIMATED BLOOD LOSS:  10 mL. ANESTHESIA:  General.    FINDINGS:  Normal upper abdomen. One spot of endometriosis on her right  ovary and adhesion on the left sigmoid _____ to the left pelvic sidewall  and possible endometriosis of the left pelvic sidewall approximately 2  mm in size. SURGEON:  Xiomara Bain MD    INDICATIONS:  The patient is a 77-year-old with increasing pelvic pain  over the last year, worsening with intercourse and menses. She is  unable to take oral contraceptives and desires definitive diagnosis. DESCRIPTION OF PROCEDURE:  The patient was taken to the operating room  where she was placed under general anesthesia in dorsal lithotomy  position in banana stirrups and prepped and draped. A time-out was  completed, and SCDs were on and functioning. Her bladder had been  emptied. The attention turned to the vagina where the cervix was  grasped with a single-tooth tenaculum. Uterus sounded to 7 cm in  retroverted fashion, and cervix was dilated, uterine manipulator placed. My outer gloves were removed. Attention then turned to the abdomen  where 8 mL of 0.5% Marcaine were instilled subumbilically and  suprapubically, and a 5-mm incision was made.   The OptiView trocar and  sheath were placed under direct visualization, and the peritoneal  placement

## 2024-01-15 DIAGNOSIS — F41.9 ANXIETY: ICD-10-CM

## 2024-01-15 NOTE — TELEPHONE ENCOUNTER
This medication refill is regarding a fax request. Refill requested by  [address] .    Requested Prescriptions     Pending Prescriptions Disp Refills    hydrOXYzine HCl (ATARAX) 10 MG tablet 90 tablet 1     Sig: Take 1 tablet by mouth 3 times daily as needed for Anxiety     Date of last visit: 9/14/2023   Date of next visit: None  Date of last refill: 6/8/23 #90/1    Rx verified, ordered and set to EP.

## 2024-01-16 RX ORDER — HYDROXYZINE HYDROCHLORIDE 10 MG/1
10 TABLET, FILM COATED ORAL 3 TIMES DAILY PRN
Qty: 90 TABLET | Refills: 1 | Status: SHIPPED | OUTPATIENT
Start: 2024-01-16

## 2024-02-19 ENCOUNTER — OFFICE VISIT (OUTPATIENT)
Dept: FAMILY MEDICINE CLINIC | Age: 34
End: 2024-02-19
Payer: COMMERCIAL

## 2024-02-19 VITALS
DIASTOLIC BLOOD PRESSURE: 68 MMHG | WEIGHT: 160 LBS | RESPIRATION RATE: 16 BRPM | HEART RATE: 72 BPM | SYSTOLIC BLOOD PRESSURE: 102 MMHG | TEMPERATURE: 98.4 F | BODY MASS INDEX: 30.23 KG/M2

## 2024-02-19 DIAGNOSIS — J06.9 ACUTE UPPER RESPIRATORY INFECTION, UNSPECIFIED: ICD-10-CM

## 2024-02-19 DIAGNOSIS — R50.9 FEBRILE ILLNESS: Primary | ICD-10-CM

## 2024-02-19 LAB
INFLUENZA A ANTIBODY: NEGATIVE
INFLUENZA B ANTIBODY: NEGATIVE

## 2024-02-19 PROCEDURE — 99214 OFFICE O/P EST MOD 30 MIN: CPT | Performed by: NURSE PRACTITIONER

## 2024-02-19 PROCEDURE — 87804 INFLUENZA ASSAY W/OPTIC: CPT | Performed by: NURSE PRACTITIONER

## 2024-02-19 RX ORDER — AZITHROMYCIN 250 MG/1
TABLET, FILM COATED ORAL
Qty: 1 PACKET | Refills: 0 | Status: SHIPPED | OUTPATIENT
Start: 2024-02-19

## 2024-02-19 ASSESSMENT — ENCOUNTER SYMPTOMS
ANAL BLEEDING: 0
NAUSEA: 0
EYE REDNESS: 0
BLOOD IN STOOL: 0
CONSTIPATION: 0
COLOR CHANGE: 0
DIARRHEA: 0
COUGH: 0
SORE THROAT: 0
SINUS PAIN: 1
EYE DISCHARGE: 0
SHORTNESS OF BREATH: 0
ABDOMINAL PAIN: 0
SINUS PRESSURE: 1
ABDOMINAL DISTENTION: 0
RHINORRHEA: 1

## 2024-02-19 NOTE — PROGRESS NOTES
[facility]  [facility]  [address] [facility] [address]  Dept: [phone]  Loc: [phone]    Visit Date: 2/19/2024    Audrye Miguel a 33 y.o. female who presents today for:   Chief Complaint   Patient presents with    Sinus Problem     Sinus infection/fullness, dizziness, bilateral ear pain, and exhaustion that started 3 weeks ago but started getting worse within the last week.     HPI:     3 weeks of sinus congestion, ear fullness - both, fatigue, fever 101.5 (100.8 this morning), body aches.     HPI  Health Maintenance   Topic Date Due    Hepatitis B vaccine (1 of 3 - 3-dose series) Never done    Cervical cancer screen  09/05/2018    Depression Monitoring  03/14/2024    COVID-19 Vaccine (3 - 2023-24 season) 09/14/2028 (Originally 9/1/2023)    DTaP/Tdap/Td vaccine (3 - Td or Tdap) 08/13/2029    Flu vaccine  Completed    Hepatitis C screen  Completed    HIV screen  Completed    Hepatitis A vaccine  Aged Out    Hib vaccine  Aged Out    HPV vaccine  Aged Out    Polio vaccine  Aged Out    Meningococcal (ACWY) vaccine  Aged Out    Pneumococcal 0-64 years Vaccine  Aged Out    Varicella vaccine  Discontinued       Current Outpatient Medications   Medication Sig Dispense Refill    azithromycin (ZITHROMAX) 250 MG tablet Take 2 tabs (500 mg) on Day 1, and take 1 tab (250 mg) on days 2 through 5. 1 packet 0    hydrOXYzine HCl (ATARAX) 10 MG tablet Take 1 tablet by mouth 3 times daily as needed for Anxiety 90 tablet 1    sertraline (ZOLOFT) 100 MG tablet TAKE 1 TABLET BY MOUTH EVERY DAY 90 tablet 3    omeprazole (PRILOSEC) 40 MG delayed release capsule TAKE 1 CAPSULE BY MOUTH EVERY DAY IN THE MORNING BEFORE BREAKFAST 90 capsule 3    busPIRone (BUSPAR) 10 MG tablet TAKE 1 TABLET BY MOUTH THREE TIMES A  tablet 3    AIMOVIG 140 MG/ML SOAJ INJECT 140 MG INTO THE SKIN EVERY 30 DAYS 1 Adjustable Dose Pre-filled Pen Syringe 5    Ubrogepant (UBRELVY) 50 MG TABS TAKE 1

## 2024-02-26 DIAGNOSIS — B37.9 ANTIBIOTIC-INDUCED YEAST INFECTION: Primary | ICD-10-CM

## 2024-02-26 DIAGNOSIS — T36.95XA ANTIBIOTIC-INDUCED YEAST INFECTION: Primary | ICD-10-CM

## 2024-02-26 RX ORDER — AZITHROMYCIN 250 MG/1
TABLET, FILM COATED ORAL
Qty: 1 PACKET | Refills: 0 | Status: SHIPPED | OUTPATIENT
Start: 2024-02-26 | End: 2024-03-01

## 2024-02-26 RX ORDER — FLUCONAZOLE 150 MG/1
TABLET ORAL
Qty: 3 TABLET | Refills: 0 | Status: SHIPPED | OUTPATIENT
Start: 2024-02-26 | End: 2024-03-01

## 2024-03-01 ENCOUNTER — OFFICE VISIT (OUTPATIENT)
Dept: NEUROLOGY | Age: 34
End: 2024-03-01
Payer: COMMERCIAL

## 2024-03-01 VITALS
HEIGHT: 61 IN | WEIGHT: 157 LBS | BODY MASS INDEX: 29.64 KG/M2 | SYSTOLIC BLOOD PRESSURE: 109 MMHG | DIASTOLIC BLOOD PRESSURE: 70 MMHG

## 2024-03-01 DIAGNOSIS — G43.109 MIGRAINE WITH AURA AND WITHOUT STATUS MIGRAINOSUS, NOT INTRACTABLE: Primary | ICD-10-CM

## 2024-03-01 PROCEDURE — 99213 OFFICE O/P EST LOW 20 MIN: CPT | Performed by: NURSE PRACTITIONER

## 2024-03-01 RX ORDER — UBROGEPANT 50 MG/1
TABLET ORAL
Qty: 10 TABLET | Refills: 4 | Status: SHIPPED | OUTPATIENT
Start: 2024-03-01

## 2024-03-01 NOTE — PATIENT INSTRUCTIONS
Continue with Ubrelvy 50 mg as needed for breakthrough headache. Refills given  Keep headache diary  Follow up in 12 months or sooner if needed.  Call if any questions or concerns.

## 2024-03-01 NOTE — PROGRESS NOTES
NEUROLOGY OUT PATIENT FOLLOW UP NOTE:  3/1/34991:59 AM    Audrey Christian is here for follow up for headache.            Allergies   Allergen Reactions    Dynapen [Dicloxacillin] Other (See Comments)     Feeling of pins and needles       Current Outpatient Medications:     hydrOXYzine HCl (ATARAX) 10 MG tablet, Take 1 tablet by mouth 3 times daily as needed for Anxiety, Disp: 90 tablet, Rfl: 1    busPIRone (BUSPAR) 10 MG tablet, TAKE 1 TABLET BY MOUTH THREE TIMES A DAY, Disp: 270 tablet, Rfl: 3    Ubrogepant (UBRELVY) 50 MG TABS, TAKE 1 TABLET BY MOUTH AS NEEDED (HEADACHE), Disp: 10 tablet, Rfl: 4    MAGNESIUM CITRATE PO, Take 250 mg by mouth daily, Disp: , Rfl:     Multiple Vitamins-Minerals (CENTRUM) TABS, Take by mouth daily, Disp: , Rfl:     loratadine (CLARITIN) 10 MG tablet, Take 1 tablet by mouth daily, Disp: , Rfl:     magnesium (MAGNESIUM-OXIDE) 250 MG TABS tablet, Take 1 tablet by mouth daily, Disp: , Rfl:     acetaminophen (TYLENOL) 325 MG tablet, Take 500 mg by mouth every 6 hours as needed for Pain, Disp: , Rfl:     AIMOVIG 140 MG/ML SOAJ, INJECT 140 MG INTO THE SKIN EVERY 30 DAYS (Patient not taking: Reported on 3/1/2024), Disp: 1 Adjustable Dose Pre-filled Pen Syringe, Rfl: 5    I reviewed the past medical history, allergies, medications, social history and family history.       PE:   Vitals:    03/01/24 0948   BP: 109/70   Site: Left Upper Arm   Position: Sitting   Cuff Size: Medium Adult   Weight: 71.2 kg (157 lb)   Height: 1.549 m (5' 1\")        General Appearance:  awake, alert, oriented   Gen: NAD, Language is Intact. Skin: no rash, lesion, dry  to touch. warm  Head: no rash, no icterus  Neck: There is no carotid bruits. The Neck is supple.   Neuro: CN 2-12 grossly intact with no focal deficits. Power 5/5 Throughout symmetric, Reflexes are +2 symmetric. Long tracts are intact. Cerebellar exam is Intact. Sensory exam is intact to light touch.  Gait is intact.  Musculoskeletal:  Has no hand

## 2024-03-15 ENCOUNTER — HOSPITAL ENCOUNTER (OUTPATIENT)
Age: 34
Discharge: HOME | End: 2024-03-15
Payer: COMMERCIAL

## 2024-03-15 ENCOUNTER — HOSPITAL ENCOUNTER (OUTPATIENT)
Dept: GENERAL RADIOLOGY | Age: 34
Discharge: HOME | End: 2024-03-15
Payer: COMMERCIAL

## 2024-03-15 DIAGNOSIS — M79.671 RIGHT FOOT PAIN: ICD-10-CM

## 2024-03-15 PROCEDURE — 73630 X-RAY EXAM OF FOOT: CPT

## 2024-06-03 ENCOUNTER — E-VISIT (OUTPATIENT)
Dept: FAMILY MEDICINE CLINIC | Age: 34
End: 2024-06-03
Payer: COMMERCIAL

## 2024-06-03 DIAGNOSIS — J06.9 ACUTE UPPER RESPIRATORY INFECTION, UNSPECIFIED: Primary | ICD-10-CM

## 2024-06-03 DIAGNOSIS — B37.9 ANTIBIOTIC-INDUCED YEAST INFECTION: ICD-10-CM

## 2024-06-03 DIAGNOSIS — T36.95XA ANTIBIOTIC-INDUCED YEAST INFECTION: ICD-10-CM

## 2024-06-03 PROCEDURE — 99422 OL DIG E/M SVC 11-20 MIN: CPT | Performed by: NURSE PRACTITIONER

## 2024-06-03 RX ORDER — FLUCONAZOLE 200 MG/1
TABLET ORAL
Qty: 2 TABLET | Refills: 0 | Status: SHIPPED | OUTPATIENT
Start: 2024-06-03

## 2024-06-03 RX ORDER — AZITHROMYCIN 250 MG/1
TABLET, FILM COATED ORAL
Qty: 1 PACKET | Refills: 0 | Status: SHIPPED | OUTPATIENT
Start: 2024-06-03

## 2024-06-03 ASSESSMENT — LIFESTYLE VARIABLES: SMOKING_STATUS: NO, I'VE NEVER SMOKED

## 2024-07-29 ENCOUNTER — LAB (OUTPATIENT)
Dept: LAB | Age: 34
End: 2024-07-29

## 2024-08-08 LAB — CYTOLOGY THIN PREP PAP: NORMAL

## 2024-08-14 ENCOUNTER — TELEMEDICINE (OUTPATIENT)
Dept: FAMILY MEDICINE CLINIC | Age: 34
End: 2024-08-14
Payer: COMMERCIAL

## 2024-08-14 DIAGNOSIS — F98.8 ATTENTION DEFICIT DISORDER (ADD) WITHOUT HYPERACTIVITY: Primary | ICD-10-CM

## 2024-08-14 PROCEDURE — 99213 OFFICE O/P EST LOW 20 MIN: CPT | Performed by: NURSE PRACTITIONER

## 2024-08-14 RX ORDER — ATOMOXETINE 40 MG/1
40 CAPSULE ORAL DAILY
Qty: 30 CAPSULE | Refills: 3 | Status: SHIPPED | OUTPATIENT
Start: 2024-08-14

## 2024-08-14 NOTE — PROGRESS NOTES
Audrey Christian, was evaluated through a synchronous (real-time) audio-video encounter. The patient (or guardian if applicable) is aware that this is a billable service, which includes applicable co-pays. This Virtual Visit was conducted with patient's (and/or legal guardian's) consent. Patient identification was verified, and a caregiver was present when appropriate.   The patient was located at Home: [address] [place] [id number]  Provider was located at Home (Appt Dept State): [place]  Confirm you are appropriately licensed, registered, or certified to deliver care in the state where the patient is located as indicated above. If you are not or unsure, please re-schedule the visit: Yes, I confirm.     Audrey Christian (:  1990) is a Established patient, presenting virtually for evaluation of the following:      Below is the assessment and plan developed based on review of pertinent history, physical exam, labs, studies, and medications.     Assessment & Plan  Attention deficit disorder (ADD) without hyperactivity   Uncontrolled, Start Strattera 40 mg once daily  May refer to Psych if medication is not helping    Orders:    atomoxetine (STRATTERA) 40 MG capsule; Take 1 capsule by mouth daily      No follow-ups on file.       Subjective   HPI    Started a new job - finds herself unable to complete tasks - jumps from one thing to another. 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           Objective   Patient-Reported Vitals  No data recorded     Physical Exam  Constitutional:       Appearance: Normal appearance. She is well-developed. She is not toxic-appearing or diaphoretic.   HENT:      Head: Normocephalic and atraumatic.      Right Ear: Hearing normal.      Left Ear: Hearing normal.      Nose: No nasal deformity.   Eyes:      General:         Right eye: No discharge.         Left eye: No discharge.   Pulmonary:

## 2024-08-15 ENCOUNTER — PATIENT MESSAGE (OUTPATIENT)
Dept: FAMILY MEDICINE CLINIC | Age: 34
End: 2024-08-15

## 2024-08-23 NOTE — TELEPHONE ENCOUNTER
Approved  PA Detail   Prior authorization approved  Payer: [facility]  - Commercial Case ID: [id number]    [phone]  Note from payer: Your PA request has been approved. Additional information will be provided in the approval communication. (Message 1145)  Approval Details    Authorized from August 23, 2024 to August 23, 2025  Electronic appeal: Not supported  Prior auth initiated by: [id number] - [place], [place] - [address] - P [phone] - F [phone]  View History  Notes

## 2024-10-16 ENCOUNTER — HOSPITAL ENCOUNTER (OUTPATIENT)
Dept: WOMENS IMAGING | Age: 34
Discharge: HOME | End: 2024-10-16
Payer: COMMERCIAL

## 2024-10-16 DIAGNOSIS — R92.8 ABNORMAL MAMMOGRAM: ICD-10-CM

## 2024-10-16 DIAGNOSIS — N64.4 BREAST PAIN: ICD-10-CM

## 2024-10-16 PROCEDURE — 76642 ULTRASOUND BREAST LIMITED: CPT

## 2024-10-16 PROCEDURE — G0279 TOMOSYNTHESIS, MAMMO: HCPCS

## 2024-10-23 ENCOUNTER — HOSPITAL ENCOUNTER (OUTPATIENT)
Dept: WOMENS IMAGING | Age: 34
Discharge: HOME | End: 2024-10-23
Attending: RADIOLOGY
Payer: COMMERCIAL

## 2024-10-23 DIAGNOSIS — N63.22 MASS OF UPPER INNER QUADRANT OF LEFT BREAST: ICD-10-CM

## 2024-10-23 PROCEDURE — 19083 BX BREAST 1ST LESION US IMAG: CPT

## 2024-10-23 PROCEDURE — 88305 TISSUE EXAM BY PATHOLOGIST: CPT

## 2024-10-23 PROCEDURE — G0279 TOMOSYNTHESIS, MAMMO: HCPCS

## 2024-10-23 NOTE — PROGRESS NOTES
[facility]  Pre-Biopsy Assessment      Patient Education    Written information about procedure Yes  left   Procedural steps explained Yes Ultrasound Biopsy   Post-op potential: bruising, hematoma, pain Yes    Self-care: activity, care of dressing Yes    Patient verbalized understanding Yes    Consent signed and witnessed Yes      Hormone Therapy Status: n/a    Recent Medication: N/A Last Dose: n/a                                     Hormone Replacement Therapy: no    Previous Breast Biopsy: no    Previous Diagnosis Cancer: no    Hysterectomy:no    Emotional Status: Calm    Language or Physical Barriers: n/a    Comments: n/a      Electronically signed by Esme Banerjee on 10/23/2024 at 7:46 AM

## 2024-10-23 NOTE — PROGRESS NOTES
Breast Biopsy Flowsheet/Post-Operative Care    Date of Procedure: 10/23/2024  Physician: Dr. Palmer  Technologist: Ángel Palmer    Biopsy:ultrasound guided breast biopsy  Lesion type: Palpable  Breast: left    Clock face position: Site #1: UIQ         Primary Method of Detection: Palpation      Microcalcification's: no   Distribution: N/A        Biopsy Method:   Sertera:    Site # 1    Gauge: 14    # of Passes: 4     Clip: Mendy      Pre-Op Assessment: (BI-RADS)   4. Suspicious Abnormality    Patient Tolerated Procedure: good  Complications: n/a  Comments: n/a    Post Operative Care  Steri strips: Yes  Dressing: Gauze, Tape   Ice Applied to Site:  No  Evidence of Bleeding:  No    Pain Verbalized: No      Written Discharge Instructions: Yes  Condition at Discharge: good  Time of Discharge: 0824    Electronically signed by Esme Banerjee on 10/23/2024 at 8:27 AM

## 2024-10-24 ENCOUNTER — CLINICAL DOCUMENTATION (OUTPATIENT)
Dept: WOMENS IMAGING | Age: 34
End: 2024-10-24

## 2024-10-24 DIAGNOSIS — Z09 FOLLOW-UP EXAM: Primary | ICD-10-CM

## 2024-10-24 DIAGNOSIS — N63.22 MASS OF UPPER INNER QUADRANT OF LEFT BREAST: ICD-10-CM

## 2024-10-24 NOTE — PROGRESS NOTES
Contact Type :    Telephone  Notes :  After consulting with Dr. Palmer,  Audrey was contacted by telephone.  She was informed of the negative biopsy results and the need to return for a 6 month follow up.  She voiced understanding.    Biopsy site: doing okay, a little sore     Results faxed to: Dr. Metz and MATY Franco

## 2024-11-14 ENCOUNTER — OFFICE VISIT (OUTPATIENT)
Dept: FAMILY MEDICINE CLINIC | Age: 34
End: 2024-11-14

## 2024-11-14 VITALS
DIASTOLIC BLOOD PRESSURE: 68 MMHG | BODY MASS INDEX: 29 KG/M2 | HEIGHT: 61 IN | TEMPERATURE: 98.4 F | SYSTOLIC BLOOD PRESSURE: 108 MMHG | HEART RATE: 81 BPM | OXYGEN SATURATION: 98 % | RESPIRATION RATE: 16 BRPM | WEIGHT: 153.6 LBS

## 2024-11-14 DIAGNOSIS — U07.1 COVID: Primary | ICD-10-CM

## 2024-11-14 DIAGNOSIS — R52 BODY ACHES: ICD-10-CM

## 2024-11-14 DIAGNOSIS — R05.1 ACUTE COUGH: ICD-10-CM

## 2024-11-14 LAB
INFLUENZA A ANTIBODY: NORMAL
INFLUENZA B ANTIBODY: NORMAL
Lab: ABNORMAL
QC PASS/FAIL: ABNORMAL
SARS-COV-2 RDRP RESP QL NAA+PROBE: POSITIVE

## 2024-11-14 RX ORDER — DEXTROMETHORPHAN HYDROBROMIDE AND PROMETHAZINE HYDROCHLORIDE 15; 6.25 MG/5ML; MG/5ML
5 SYRUP ORAL 4 TIMES DAILY PRN
Qty: 118 ML | Refills: 0 | Status: SHIPPED | OUTPATIENT
Start: 2024-11-14 | End: 2024-11-21

## 2024-11-14 RX ORDER — CETIRIZINE HYDROCHLORIDE 10 MG/1
10 TABLET ORAL DAILY
COMMUNITY

## 2024-11-14 RX ORDER — METHYLPREDNISOLONE 4 MG/1
TABLET ORAL
Qty: 1 KIT | Refills: 0 | Status: SHIPPED | OUTPATIENT
Start: 2024-11-14 | End: 2024-11-20

## 2024-11-14 SDOH — ECONOMIC STABILITY: INCOME INSECURITY: HOW HARD IS IT FOR YOU TO PAY FOR THE VERY BASICS LIKE FOOD, HOUSING, MEDICAL CARE, AND HEATING?: NOT HARD AT ALL

## 2024-11-14 SDOH — ECONOMIC STABILITY: FOOD INSECURITY: WITHIN THE PAST 12 MONTHS, THE FOOD YOU BOUGHT JUST DIDN'T LAST AND YOU DIDN'T HAVE MONEY TO GET MORE.: NEVER TRUE

## 2024-11-14 SDOH — ECONOMIC STABILITY: FOOD INSECURITY: WITHIN THE PAST 12 MONTHS, YOU WORRIED THAT YOUR FOOD WOULD RUN OUT BEFORE YOU GOT MONEY TO BUY MORE.: NEVER TRUE

## 2024-11-14 ASSESSMENT — ENCOUNTER SYMPTOMS: COUGH: 1

## 2024-11-14 ASSESSMENT — PATIENT HEALTH QUESTIONNAIRE - PHQ9
SUM OF ALL RESPONSES TO PHQ9 QUESTIONS 1 & 2: 0
3. TROUBLE FALLING OR STAYING ASLEEP: NOT AT ALL
2. FEELING DOWN, DEPRESSED OR HOPELESS: NOT AT ALL
7. TROUBLE CONCENTRATING ON THINGS, SUCH AS READING THE NEWSPAPER OR WATCHING TELEVISION: NOT AT ALL
5. POOR APPETITE OR OVEREATING: NOT AT ALL
6. FEELING BAD ABOUT YOURSELF - OR THAT YOU ARE A FAILURE OR HAVE LET YOURSELF OR YOUR FAMILY DOWN: NOT AT ALL
SUM OF ALL RESPONSES TO PHQ QUESTIONS 1-9: 0
8. MOVING OR SPEAKING SO SLOWLY THAT OTHER PEOPLE COULD HAVE NOTICED. OR THE OPPOSITE, BEING SO FIGETY OR RESTLESS THAT YOU HAVE BEEN MOVING AROUND A LOT MORE THAN USUAL: NOT AT ALL
SUM OF ALL RESPONSES TO PHQ QUESTIONS 1-9: 0
4. FEELING TIRED OR HAVING LITTLE ENERGY: NOT AT ALL
1. LITTLE INTEREST OR PLEASURE IN DOING THINGS: NOT AT ALL
SUM OF ALL RESPONSES TO PHQ QUESTIONS 1-9: 0
9. THOUGHTS THAT YOU WOULD BE BETTER OFF DEAD, OR OF HURTING YOURSELF: NOT AT ALL
10. IF YOU CHECKED OFF ANY PROBLEMS, HOW DIFFICULT HAVE THESE PROBLEMS MADE IT FOR YOU TO DO YOUR WORK, TAKE CARE OF THINGS AT HOME, OR GET ALONG WITH OTHER PEOPLE: NOT DIFFICULT AT ALL
SUM OF ALL RESPONSES TO PHQ QUESTIONS 1-9: 0

## 2024-11-14 NOTE — PROGRESS NOTES
[facility]  [facility]  [address] [address]  Dept: [phone]  Dept Fax: [phone]  Loc: [phone]     Visit Date:  11/14/2024      Patient:  Audrey Christian  YOB: 1990    HPI:     Chief Complaint   Patient presents with    Illness     4 day hx of fevers up to 101., cough, body aches, dizziness. Taking Ibuprofen prn.       Pt presents to the office today for cough, congestion and body aches. Symptoms started about 4-5 days ago. Fever started yesterday.  Sinus pain and pressure.     Cough  This is a new problem. The current episode started in the past 7 days. The problem has been gradually worsening. The cough is Productive of sputum. Associated symptoms include chills, ear congestion, a fever, headaches, myalgias, nasal congestion, postnasal drip and rhinorrhea. Pertinent negatives include no chest pain, ear pain, heartburn, hemoptysis, rash, sore throat, shortness of breath, sweats, weight loss or wheezing. The symptoms are aggravated by lying down. She has tried rest, OTC cough suppressant, body position changes and cool air for the symptoms. The treatment provided mild relief. There is no history of asthma, bronchiectasis, bronchitis, emphysema, environmental allergies or pneumonia.   Generalized Body Aches  This is a new problem. The current episode started in the past 7 days. The problem has been waxing and waning. Associated symptoms include arthralgias, chills, congestion, coughing, fatigue, a fever, headaches and myalgias. Pertinent negatives include no abdominal pain, anorexia, change in bowel habit, chest pain, joint swelling, nausea, rash, sore throat, swollen glands, urinary symptoms, vertigo, visual change or vomiting. She has tried sleep, rest, acetaminophen, NSAIDs, lying down and drinking for the symptoms. The treatment provided mild relief.       Medications    Current Outpatient Medications:     cetirizine

## 2024-11-15 ASSESSMENT — ENCOUNTER SYMPTOMS
HEMOPTYSIS: 0
VISUAL CHANGE: 0
SORE THROAT: 0
WHEEZING: 0
NAUSEA: 0
SWOLLEN GLANDS: 0
ABDOMINAL PAIN: 0
SHORTNESS OF BREATH: 0
HEARTBURN: 0
RHINORRHEA: 1
CHANGE IN BOWEL HABIT: 0
VOMITING: 0

## 2024-11-20 ENCOUNTER — OFFICE VISIT (OUTPATIENT)
Dept: FAMILY MEDICINE CLINIC | Age: 34
End: 2024-11-20

## 2024-11-20 VITALS
SYSTOLIC BLOOD PRESSURE: 90 MMHG | DIASTOLIC BLOOD PRESSURE: 62 MMHG | BODY MASS INDEX: 28.72 KG/M2 | WEIGHT: 152 LBS | HEART RATE: 76 BPM | RESPIRATION RATE: 12 BRPM

## 2024-11-20 DIAGNOSIS — Z00.00 ENCOUNTER FOR WELL ADULT EXAM WITHOUT ABNORMAL FINDINGS: Primary | ICD-10-CM

## 2024-11-20 DIAGNOSIS — R73.01 IFG (IMPAIRED FASTING GLUCOSE): ICD-10-CM

## 2024-11-20 DIAGNOSIS — E78.2 MIXED HYPERLIPIDEMIA: ICD-10-CM

## 2024-11-20 DIAGNOSIS — F41.9 ANXIETY: ICD-10-CM

## 2024-11-20 RX ORDER — HYDROXYZINE HYDROCHLORIDE 10 MG/1
10 TABLET, FILM COATED ORAL 3 TIMES DAILY PRN
Qty: 90 TABLET | Refills: 1 | Status: SHIPPED | OUTPATIENT
Start: 2024-11-20

## 2024-11-20 RX ORDER — ESCITALOPRAM OXALATE 10 MG/1
10 TABLET ORAL DAILY
Qty: 30 TABLET | Refills: 3 | Status: SHIPPED | OUTPATIENT
Start: 2024-11-20

## 2024-11-20 ASSESSMENT — ENCOUNTER SYMPTOMS
SHORTNESS OF BREATH: 0
WHEEZING: 0
ABDOMINAL PAIN: 0
VOMITING: 0
COUGH: 0
NAUSEA: 0
DIARRHEA: 0
BACK PAIN: 0
CONSTIPATION: 0

## 2024-11-20 NOTE — PROGRESS NOTES
No murmur heard.  Pulmonary:      Effort: Pulmonary effort is normal. No respiratory distress.      Breath sounds: Normal breath sounds. No wheezing.   Abdominal:      General: Abdomen is flat. Bowel sounds are normal. There is no distension.      Palpations: Abdomen is soft. There is no mass.      Tenderness: There is no abdominal tenderness.   Musculoskeletal:      Cervical back: Neck supple.      Right lower leg: No edema.      Left lower leg: No edema.   Skin:     General: Skin is warm and dry.   Neurological:      General: No focal deficit present.      Mental Status: She is alert and oriented to person, place, and time.   Psychiatric:         Mood and Affect: Mood is anxious. Affect is tearful.         Behavior: Behavior normal.         Immunization History   Administered Date(s) Administered    COVID-19, PFIZER PURPLE top, DILUTE for use, (age 12 y+), 30mcg/0.3mL 12/15/2021, 01/05/2022    Influenza Virus Vaccine 11/20/2018, 11/26/2019, 11/01/2020, 11/21/2022    Influenza, AFLURIA (age 3 y+), FLUZONE, (age 6 mo+), Quadv MDV, 0.5mL 11/21/2022    Influenza, FLUCELVAX, (age 6 mo+), MDCK, Quadv PF, 0.5mL 12/09/2021, 12/11/2023    TDaP, ADACEL (age 10y-64y), BOOSTRIX (age 10y+), IM, 0.5mL 07/28/2016, 08/13/2019       Health Maintenance Due   Topic Date Due    Hepatitis B vaccine (1 of 3 - 19+ 3-dose series) Never done    Flu vaccine (1) 08/01/2024    COVID-19 Vaccine (3 - 2023-24 season) 09/01/2024       Food Insecurity: No Food Insecurity (11/14/2024)    Hunger Vital Sign     Worried About Running Out of Food in the Last Year: Never true     Ran Out of Food in the Last Year: Never true       Assessment / Plan:   1. Encounter for well adult exam without abnormal findings  Patient overall doing well.  Wellness handout provided.  Preventive care reviewed.  Recommend follow-up as scheduled for breast ultrasound and repeat Pap given ASCUS on last Pap.  -Encourage flu vaccine  -Continue increasing physical

## 2024-12-19 ENCOUNTER — OFFICE VISIT (OUTPATIENT)
Dept: FAMILY MEDICINE CLINIC | Age: 34
End: 2024-12-19

## 2024-12-19 VITALS
RESPIRATION RATE: 16 BRPM | DIASTOLIC BLOOD PRESSURE: 70 MMHG | SYSTOLIC BLOOD PRESSURE: 120 MMHG | HEART RATE: 76 BPM | WEIGHT: 156.4 LBS | BODY MASS INDEX: 29.55 KG/M2

## 2024-12-19 DIAGNOSIS — F41.9 ANXIETY: Primary | ICD-10-CM

## 2024-12-19 DIAGNOSIS — Z23 NEED FOR INFLUENZA VACCINATION: ICD-10-CM

## 2024-12-19 ASSESSMENT — ENCOUNTER SYMPTOMS
ABDOMINAL PAIN: 0
ABDOMINAL DISTENTION: 0
EYE DISCHARGE: 0
SORE THROAT: 0
CONSTIPATION: 0
EYE REDNESS: 0
SHORTNESS OF BREATH: 0
ANAL BLEEDING: 0
COUGH: 0
DIARRHEA: 0
RHINORRHEA: 0
COLOR CHANGE: 0
BLOOD IN STOOL: 0
NAUSEA: 0

## 2024-12-19 NOTE — PROGRESS NOTES
Vaccine Information Sheet, \"Influenza - Inactivated\"  given to Audrey Christian, and verbalized understanding.    Patient responses:    Have you ever had a reaction to a flu vaccine? No  Do you have an allergy to eggs, Latex -induced anaphylaxis, neomycin or polymixin?  No  Do you have an allergy to Thimerosal, contact lens solution, or Merthiolate? No  Have you ever had Guillian Votaw Syndrome?  No  Do you have any current illness?  No  Do you have a temperature above 100.4 degrees? No  Are you pregnant? No  If pregnant, permission obtained from physician? N/A  Do you have an active neurological disorder? No    Immunization(s) given during visit:    Immunizations Administered       Name Date Dose Route    Influenza, FLUCELVAX, (age 6 mo+) IM, Trivalent PF, 0.5mL 12/19/2024 0.5 mL Intramuscular    Site: Deltoid- Left    Lot: [id number]    NDC: 67794-488-40          After obtaining consent, and per orders of Kori Appiah CNP, the injection above was given by Madhavi Polk LPN. Medication verified by Kori Appiah CNP.    Patient tolerated well.

## 2025-01-31 ENCOUNTER — OFFICE VISIT (OUTPATIENT)
Dept: NEUROLOGY | Age: 35
End: 2025-01-31
Payer: COMMERCIAL

## 2025-01-31 VITALS
WEIGHT: 154 LBS | SYSTOLIC BLOOD PRESSURE: 110 MMHG | BODY MASS INDEX: 29.07 KG/M2 | HEART RATE: 72 BPM | HEIGHT: 61 IN | DIASTOLIC BLOOD PRESSURE: 72 MMHG

## 2025-01-31 DIAGNOSIS — G43.109 MIGRAINE WITH AURA AND WITHOUT STATUS MIGRAINOSUS, NOT INTRACTABLE: Primary | ICD-10-CM

## 2025-01-31 PROCEDURE — 99213 OFFICE O/P EST LOW 20 MIN: CPT | Performed by: PSYCHIATRY & NEUROLOGY

## 2025-01-31 RX ORDER — UBROGEPANT 50 MG/1
TABLET ORAL
Qty: 10 TABLET | Refills: 4 | Status: SHIPPED | OUTPATIENT
Start: 2025-01-31

## 2025-01-31 NOTE — PROGRESS NOTES
clear.    Impression  1. No evidence of acute intracranial abnormality.  2. Stable small focus of T2/FLAIR hyperintensity in the right frontal white matter likely representing a small focus of gliosis.  **This report has been created using voice recognition software. It may contain minor errors which are inherent in voice recognition technology.**    Final report electronically signed by Dr. Uziel Hammer DO, MD on 11/17/2020 3:15 PM              Assessment:     Diagnosis Orders   1. Migraine with aura and without status migrainosus, not intractable             Follow up for headache. Her headaches are very well controlled. She has sinus surgery in 5/2022 and did well. She is using ublrevy for breakthrough headache which helps. No side effects noted.  2-3 headaches per year. No prevention. She is  off preventative medications for headaches as they are few and far between and just use rescue medication. She is pleased with how she is doing. After a discussion with patient  we agreed on the following plan.         Plan:  Continue with Ubrelvy 50 mg as needed for breakthrough headache. Refills given  Follow up in 12 months or sooner if needed.  Call if any questions or concerns.         Total time 23 min    Tere Fitzgerald MD

## 2025-01-31 NOTE — PATIENT INSTRUCTIONS
Continue with Ubrelvy 50 mg as needed for breakthrough headache. Refills given  Follow up in 12 months or sooner if needed.  Call if any questions or concerns.

## 2025-03-13 ENCOUNTER — TELEPHONE (OUTPATIENT)
Dept: NEUROLOGY | Age: 35
End: 2025-03-13

## 2025-03-13 NOTE — TELEPHONE ENCOUNTER
Approved  PA Detail   Prior authorization approved  Payer: [facility]  - Commercial Case ID: [id number]    [phone]  Note from payer: Your PA request has been approved. Additional information will be provided in the approval communication. (Message 1143) - Additional information is available to assist in the completion of the prior authorization. This information is accessible via the PA Detail / Prior Auth Portal link.  Approval Details    Authorized from March 13, 2025 to March 13, 2026  Electronic appeal: Not supported  View History

## 2025-03-13 NOTE — TELEPHONE ENCOUNTER
Authorization initiated for Ubrelvy 50 mg per covermymeds request -[facility].

## 2025-03-23 DIAGNOSIS — F41.9 ANXIETY: ICD-10-CM

## 2025-03-24 RX ORDER — ESCITALOPRAM OXALATE 10 MG/1
10 TABLET ORAL DAILY
Qty: 90 TABLET | Refills: 3 | Status: SHIPPED | OUTPATIENT
Start: 2025-03-24

## 2025-03-24 NOTE — TELEPHONE ENCOUNTER
This medication refill is regarding a MyChart request. Refill requested by patient.  Patient Comment: \"Could we increase the quantity to a 90 day supply?\"  Requested Prescriptions     Pending Prescriptions Disp Refills    escitalopram (LEXAPRO) 10 MG tablet 30 tablet 3     Sig: Take 1 tablet by mouth daily     Date of last visit: 12/19/2024   Date of next visit: Visit date not found  Date of last refill: 11/20/24   #30/3  Pharmacy Name: [facility]    Last Lipid Panel:    Lab Results   Component Value Date/Time    CHOL 222 01/04/2024 08:32 AM    TRIG 51 01/04/2024 08:32 AM    HDL 71 01/04/2024 08:32 AM     Last CMP:   Lab Results   Component Value Date     01/04/2024    K 4.2 01/04/2024     01/04/2024    CO2 27 01/04/2024    BUN 10 01/04/2024    CREATININE 0.7 01/04/2024    GLUCOSE 95 01/04/2024    CALCIUM 9.2 01/04/2024    BILITOT 0.2 (L) 01/04/2024    ALKPHOS 68 01/04/2024    AST 18 01/04/2024    ALT 18 01/04/2024    LABGLOM >60 01/04/2024       Last Thyroid:    Lab Results   Component Value Date    TSH 1.330 01/04/2024    O4OFGBA 106 05/18/2021     Last Hemoglobin A1C:    Lab Results   Component Value Date/Time    LABA1C 5.2 05/02/2023 08:29 AM       Rx verified, ordered and set to EP.

## 2025-05-01 ENCOUNTER — HOSPITAL ENCOUNTER (OUTPATIENT)
Age: 35
Discharge: HOME | End: 2025-05-01

## 2025-05-02 LAB — RUBV IGG SERPL IA-ACNC: > 500 IU/ML

## 2025-05-04 LAB — MEV IGG SER-ACNC: 7 AU/ML

## 2025-05-05 LAB
MUV IGG TITR SER: 2.71 {TITER}
VZV IGG SER QL IA: 2.02

## 2025-05-13 ENCOUNTER — HOSPITAL ENCOUNTER (OUTPATIENT)
Dept: WOMENS IMAGING | Age: 35
Discharge: HOME | End: 2025-05-13
Attending: RADIOLOGY
Payer: COMMERCIAL

## 2025-05-13 DIAGNOSIS — N63.22 MASS OF UPPER INNER QUADRANT OF LEFT BREAST: ICD-10-CM

## 2025-05-13 DIAGNOSIS — Z09 FOLLOW-UP EXAM: ICD-10-CM

## 2025-05-13 PROCEDURE — 76642 ULTRASOUND BREAST LIMITED: CPT

## 2025-06-16 ENCOUNTER — E-VISIT (OUTPATIENT)
Dept: FAMILY MEDICINE CLINIC | Age: 35
End: 2025-06-16
Payer: COMMERCIAL

## 2025-06-16 DIAGNOSIS — J01.90 ACUTE BACTERIAL SINUSITIS: Primary | ICD-10-CM

## 2025-06-16 DIAGNOSIS — B96.89 ACUTE BACTERIAL SINUSITIS: Primary | ICD-10-CM

## 2025-06-16 PROCEDURE — 99422 OL DIG E/M SVC 11-20 MIN: CPT | Performed by: NURSE PRACTITIONER

## 2025-06-16 RX ORDER — DOXYCYCLINE HYCLATE 100 MG
100 TABLET ORAL 2 TIMES DAILY
Qty: 20 TABLET | Refills: 0 | Status: SHIPPED | OUTPATIENT
Start: 2025-06-16 | End: 2025-06-26

## 2025-06-16 RX ORDER — METHYLPREDNISOLONE 4 MG/1
TABLET ORAL
Qty: 1 KIT | Refills: 0 | Status: SHIPPED | OUTPATIENT
Start: 2025-06-16 | End: 2025-06-22

## 2025-06-16 ASSESSMENT — LIFESTYLE VARIABLES: SMOKING_STATUS: NO, I'VE NEVER SMOKED

## 2025-06-16 NOTE — PROGRESS NOTES
[facility]  [facility]  [address] [facility] [address]  Dept: [phone]  Dept Fax: [phone]  Loc: [phone]      Audrey Christian (1990) initiated an asynchronous digital communication through [employer].    HPI: per patient questionnaire     Exam: not applicable    Diagnoses and all orders for this visit:  Diagnoses and all orders for this visit:    Acute bacterial sinusitis  -     doxycycline hyclate (VIBRA-TABS) 100 MG tablet; Take 1 tablet by mouth 2 times daily for 10 days  -     methylPREDNISolone (MEDROL DOSEPACK) 4 MG tablet; Take by mouth.    - Rest and increase fluids  - Tylenol as needed for pain and fever  - Good handwashing and clean all surfaces touched  - Call office with any questions or concerns, or if symptoms are getting worse or changing  - ER for any chest pain or SOB    11 minutes were spent on the digital evaluation and management of this patient.    MELANY ROGEL - CNP

## 2025-08-06 ENCOUNTER — E-VISIT (OUTPATIENT)
Dept: FAMILY MEDICINE CLINIC | Age: 35
End: 2025-08-06
Payer: COMMERCIAL

## 2025-08-06 DIAGNOSIS — J20.9 ACUTE BRONCHITIS, UNSPECIFIED ORGANISM: Primary | ICD-10-CM

## 2025-08-06 PROCEDURE — 99421 OL DIG E/M SVC 5-10 MIN: CPT | Performed by: STUDENT IN AN ORGANIZED HEALTH CARE EDUCATION/TRAINING PROGRAM

## 2025-08-06 RX ORDER — AZITHROMYCIN 250 MG/1
TABLET, FILM COATED ORAL
Qty: 6 TABLET | Refills: 0 | Status: SHIPPED | OUTPATIENT
Start: 2025-08-06 | End: 2025-08-16

## 2025-08-06 ASSESSMENT — LIFESTYLE VARIABLES: SMOKING_STATUS: NO, I'VE NEVER SMOKED

## 2025-08-23 SDOH — ECONOMIC STABILITY: INCOME INSECURITY: IN THE LAST 12 MONTHS, WAS THERE A TIME WHEN YOU WERE NOT ABLE TO PAY THE MORTGAGE OR RENT ON TIME?: NO

## 2025-08-23 SDOH — ECONOMIC STABILITY: FOOD INSECURITY: WITHIN THE PAST 12 MONTHS, YOU WORRIED THAT YOUR FOOD WOULD RUN OUT BEFORE YOU GOT MONEY TO BUY MORE.: NEVER TRUE

## 2025-08-23 SDOH — ECONOMIC STABILITY: FOOD INSECURITY: WITHIN THE PAST 12 MONTHS, THE FOOD YOU BOUGHT JUST DIDN'T LAST AND YOU DIDN'T HAVE MONEY TO GET MORE.: NEVER TRUE

## 2025-08-23 ASSESSMENT — PATIENT HEALTH QUESTIONNAIRE - PHQ9
2. FEELING DOWN, DEPRESSED OR HOPELESS: NOT AT ALL
4. FEELING TIRED OR HAVING LITTLE ENERGY: SEVERAL DAYS
10. IF YOU CHECKED OFF ANY PROBLEMS, HOW DIFFICULT HAVE THESE PROBLEMS MADE IT FOR YOU TO DO YOUR WORK, TAKE CARE OF THINGS AT HOME, OR GET ALONG WITH OTHER PEOPLE: NOT DIFFICULT AT ALL
2. FEELING DOWN, DEPRESSED OR HOPELESS: NOT AT ALL
8. MOVING OR SPEAKING SO SLOWLY THAT OTHER PEOPLE COULD HAVE NOTICED. OR THE OPPOSITE, BEING SO FIGETY OR RESTLESS THAT YOU HAVE BEEN MOVING AROUND A LOT MORE THAN USUAL: NOT AT ALL
4. FEELING TIRED OR HAVING LITTLE ENERGY: SEVERAL DAYS
5. POOR APPETITE OR OVEREATING: SEVERAL DAYS
5. POOR APPETITE OR OVEREATING: SEVERAL DAYS
6. FEELING BAD ABOUT YOURSELF - OR THAT YOU ARE A FAILURE OR HAVE LET YOURSELF OR YOUR FAMILY DOWN: SEVERAL DAYS
3. TROUBLE FALLING OR STAYING ASLEEP: SEVERAL DAYS
10. IF YOU CHECKED OFF ANY PROBLEMS, HOW DIFFICULT HAVE THESE PROBLEMS MADE IT FOR YOU TO DO YOUR WORK, TAKE CARE OF THINGS AT HOME, OR GET ALONG WITH OTHER PEOPLE: NOT DIFFICULT AT ALL
SUM OF ALL RESPONSES TO PHQ QUESTIONS 1-9: 4
8. MOVING OR SPEAKING SO SLOWLY THAT OTHER PEOPLE COULD HAVE NOTICED. OR THE OPPOSITE - BEING SO FIDGETY OR RESTLESS THAT YOU HAVE BEEN MOVING AROUND A LOT MORE THAN USUAL: NOT AT ALL
3. TROUBLE FALLING OR STAYING ASLEEP: SEVERAL DAYS
1. LITTLE INTEREST OR PLEASURE IN DOING THINGS: NOT AT ALL
9. THOUGHTS THAT YOU WOULD BE BETTER OFF DEAD, OR OF HURTING YOURSELF: NOT AT ALL
SUM OF ALL RESPONSES TO PHQ QUESTIONS 1-9: 4
6. FEELING BAD ABOUT YOURSELF - OR THAT YOU ARE A FAILURE OR HAVE LET YOURSELF OR YOUR FAMILY DOWN: SEVERAL DAYS
9. THOUGHTS THAT YOU WOULD BE BETTER OFF DEAD, OR OF HURTING YOURSELF: NOT AT ALL
SUM OF ALL RESPONSES TO PHQ QUESTIONS 1-9: 4
SUM OF ALL RESPONSES TO PHQ QUESTIONS 1-9: 4
1. LITTLE INTEREST OR PLEASURE IN DOING THINGS: NOT AT ALL
7. TROUBLE CONCENTRATING ON THINGS, SUCH AS READING THE NEWSPAPER OR WATCHING TELEVISION: NOT AT ALL
7. TROUBLE CONCENTRATING ON THINGS, SUCH AS READING THE NEWSPAPER OR WATCHING TELEVISION: NOT AT ALL
SUM OF ALL RESPONSES TO PHQ QUESTIONS 1-9: 4

## 2025-08-26 ENCOUNTER — OFFICE VISIT (OUTPATIENT)
Dept: FAMILY MEDICINE CLINIC | Age: 35
End: 2025-08-26

## 2025-08-26 VITALS
WEIGHT: 165 LBS | HEIGHT: 61 IN | HEART RATE: 72 BPM | RESPIRATION RATE: 16 BRPM | TEMPERATURE: 98.6 F | BODY MASS INDEX: 31.15 KG/M2 | SYSTOLIC BLOOD PRESSURE: 98 MMHG | DIASTOLIC BLOOD PRESSURE: 62 MMHG

## 2025-08-26 DIAGNOSIS — E55.9 VITAMIN D DEFICIENCY: ICD-10-CM

## 2025-08-26 DIAGNOSIS — F41.9 ANXIETY: Primary | ICD-10-CM

## 2025-08-26 DIAGNOSIS — N92.1 MENORRHAGIA WITH IRREGULAR CYCLE: ICD-10-CM

## 2025-08-26 RX ORDER — ESCITALOPRAM OXALATE 20 MG/1
20 TABLET ORAL DAILY
Qty: 90 TABLET | Refills: 1 | Status: SHIPPED | OUTPATIENT
Start: 2025-08-26

## 2025-08-26 RX ORDER — ERGOCALCIFEROL 1.25 MG/1
50000 CAPSULE, LIQUID FILLED ORAL WEEKLY
COMMUNITY

## 2025-08-26 ASSESSMENT — ENCOUNTER SYMPTOMS
FACIAL SWELLING: 0
DIARRHEA: 0
EYE PAIN: 0
NAUSEA: 0
VOMITING: 0
TROUBLE SWALLOWING: 0
COUGH: 0
SHORTNESS OF BREATH: 0
WHEEZING: 0
COLOR CHANGE: 0
ABDOMINAL PAIN: 0
SINUS PAIN: 0
SORE THROAT: 0
BACK PAIN: 0

## (undated) DEVICE — SEALER ENDOSCP L37CM NANO COAT BLNT TIP LAP DIV

## (undated) DEVICE — RED RUBBER ROBINSON URETHRAL CATHETER, RADIOPAQUE, SMOOTH ROUNDED TIP, 14 FR (4.7 MM): Brand: DOVER

## (undated) DEVICE — GLOVE SURG SZ 7 L12IN FNGR THK94MIL TRNSLUC YEL LTX HYDRGEL

## (undated) DEVICE — PAD,SANITARY,11 IN,MAXI,W/WINGS,N-STRL: Brand: MEDLINE

## (undated) DEVICE — INTENDED FOR TISSUE SEPARATION, AND OTHER PROCEDURES THAT REQUIRE A SHARP SURGICAL BLADE TO PUNCTURE OR CUT.: Brand: BARD-PARKER ® CARBON RIB-BACK BLADES

## (undated) DEVICE — SOLUTION IRRIG 1500ML STRL H2O USP POUR PLAS BTL

## (undated) DEVICE — WARMER SCP 2 ANTIFOG LAP DISP

## (undated) DEVICE — SURE SET SINGLE BASIN-LF: Brand: MEDLINE INDUSTRIES, INC.

## (undated) DEVICE — TOWEL,OR,DSP,ST,BLUE,DLX,4/PK,20PK/CS: Brand: MEDLINE

## (undated) DEVICE — SUTURE VCRL SZ 4-0 L27IN ABSRB UD L19MM FS-2 3/8 CIR REV J422H

## (undated) DEVICE — GAUZE,SPONGE,8"X4",12PLY,XRAY,STRL,LF: Brand: MEDLINE

## (undated) DEVICE — BANDAGE ADH W1XL3IN NAT FAB WVN FLX DURABLE N ADH PD SEAL

## (undated) DEVICE — GARMENT,MEDLINE,DVT,INT,CALF,MED, GEN2: Brand: MEDLINE

## (undated) DEVICE — TROCAR: Brand: KII FIOS FIRST ENTRY

## (undated) DEVICE — CHLORAPREP 26ML CLEAR

## (undated) DEVICE — PACK,LAPAROSCOPY,PK II,SIRUS: Brand: MEDLINE